# Patient Record
Sex: MALE | Race: WHITE | Employment: OTHER | URBAN - NONMETROPOLITAN AREA
[De-identification: names, ages, dates, MRNs, and addresses within clinical notes are randomized per-mention and may not be internally consistent; named-entity substitution may affect disease eponyms.]

---

## 2019-04-16 LAB
CHOLESTEROL, TOTAL: 129 MG/DL
CHOLESTEROL/HDL RATIO: 2.6
HDLC SERPL-MCNC: 49 MG/DL (ref 35–70)
LDL CHOLESTEROL CALCULATED: 58 MG/DL (ref 0–160)
TRIGL SERPL-MCNC: 134 MG/DL
VLDLC SERPL CALC-MCNC: NORMAL MG/DL

## 2019-05-10 VITALS
DIASTOLIC BLOOD PRESSURE: 70 MMHG | HEART RATE: 72 BPM | BODY MASS INDEX: 33.95 KG/M2 | HEIGHT: 68 IN | WEIGHT: 224 LBS | SYSTOLIC BLOOD PRESSURE: 136 MMHG

## 2019-05-10 RX ORDER — CHLORAL HYDRATE 500 MG
3000 CAPSULE ORAL DAILY
COMMUNITY
End: 2019-05-15

## 2019-05-10 RX ORDER — ATORVASTATIN CALCIUM 40 MG/1
20 TABLET, FILM COATED ORAL DAILY
COMMUNITY
End: 2020-09-16

## 2019-05-10 RX ORDER — PREGABALIN 50 MG/1
50 CAPSULE ORAL 3 TIMES DAILY
COMMUNITY
End: 2019-05-15

## 2019-05-10 RX ORDER — INSULIN GLARGINE 100 [IU]/ML
32 INJECTION, SOLUTION SUBCUTANEOUS
COMMUNITY
End: 2022-10-19 | Stop reason: ALTCHOICE

## 2019-05-10 RX ORDER — CLOPIDOGREL BISULFATE 75 MG/1
75 TABLET ORAL DAILY
COMMUNITY

## 2019-05-10 RX ORDER — TAMSULOSIN HYDROCHLORIDE 0.4 MG/1
0.4 CAPSULE ORAL DAILY
COMMUNITY
End: 2019-05-15

## 2019-05-10 RX ORDER — LISINOPRIL 10 MG/1
10 TABLET ORAL DAILY
COMMUNITY

## 2019-05-10 SDOH — HEALTH STABILITY: MENTAL HEALTH: HOW OFTEN DO YOU HAVE A DRINK CONTAINING ALCOHOL?: NEVER

## 2019-05-13 PROBLEM — I63.9 CVA (CEREBRAL VASCULAR ACCIDENT) (HCC): Status: ACTIVE | Noted: 2019-05-13

## 2019-05-13 PROBLEM — C61 PROSTATE CA (HCC): Status: ACTIVE | Noted: 2019-05-13

## 2019-05-13 PROBLEM — E78.2 MIXED HYPERLIPIDEMIA: Status: ACTIVE | Noted: 2019-05-13

## 2019-05-13 PROBLEM — Z87.09 H/O ASBESTOSIS: Status: ACTIVE | Noted: 2019-05-13

## 2019-05-13 PROBLEM — G47.30 SLEEP APNEA WITH USE OF CONTINUOUS POSITIVE AIRWAY PRESSURE (CPAP): Status: ACTIVE | Noted: 2019-05-13

## 2019-05-13 PROBLEM — I10 ESSENTIAL HYPERTENSION: Status: ACTIVE | Noted: 2017-11-21

## 2019-05-13 PROBLEM — E11.40 NEUROPATHY DUE TO TYPE 2 DIABETES MELLITUS (HCC): Status: ACTIVE | Noted: 2019-05-13

## 2019-05-13 PROBLEM — E11.9 TYPE 2 DIABETES MELLITUS (HCC): Status: ACTIVE | Noted: 2017-11-21

## 2019-05-15 ENCOUNTER — OFFICE VISIT (OUTPATIENT)
Dept: PRIMARY CARE CLINIC | Age: 79
End: 2019-05-15
Payer: MEDICARE

## 2019-05-15 ENCOUNTER — TELEPHONE (OUTPATIENT)
Dept: PRIMARY CARE CLINIC | Age: 79
End: 2019-05-15

## 2019-05-15 ENCOUNTER — HOSPITAL ENCOUNTER (OUTPATIENT)
Age: 79
Discharge: HOME | End: 2019-05-17
Payer: MEDICARE

## 2019-05-15 VITALS
OXYGEN SATURATION: 94 % | TEMPERATURE: 97.6 F | HEART RATE: 80 BPM | WEIGHT: 227 LBS | SYSTOLIC BLOOD PRESSURE: 128 MMHG | DIASTOLIC BLOOD PRESSURE: 72 MMHG | HEIGHT: 68 IN | BODY MASS INDEX: 34.4 KG/M2

## 2019-05-15 DIAGNOSIS — Z79.4 TYPE 2 DIABETES MELLITUS WITH DIABETIC POLYNEUROPATHY, WITH LONG-TERM CURRENT USE OF INSULIN (HCC): ICD-10-CM

## 2019-05-15 DIAGNOSIS — E11.40 NEUROPATHY DUE TO TYPE 2 DIABETES MELLITUS (HCC): ICD-10-CM

## 2019-05-15 DIAGNOSIS — E11.40 TYPE 2 DIABETES MELLITUS WITH DIABETIC NEUROPATHY, WITH LONG-TERM CURRENT USE OF INSULIN (HCC): ICD-10-CM

## 2019-05-15 DIAGNOSIS — M47.817 LUMBAR AND SACRAL OSTEOARTHRITIS: ICD-10-CM

## 2019-05-15 DIAGNOSIS — I63.50 CEREBROVASCULAR ACCIDENT (CVA) DUE TO OCCLUSION OF CEREBRAL ARTERY (HCC): ICD-10-CM

## 2019-05-15 DIAGNOSIS — E78.2 MIXED HYPERLIPIDEMIA: ICD-10-CM

## 2019-05-15 DIAGNOSIS — G47.30 SLEEP APNEA WITH USE OF CONTINUOUS POSITIVE AIRWAY PRESSURE (CPAP): ICD-10-CM

## 2019-05-15 DIAGNOSIS — Z79.4 TYPE 2 DIABETES MELLITUS WITH DIABETIC NEUROPATHY, WITH LONG-TERM CURRENT USE OF INSULIN (HCC): ICD-10-CM

## 2019-05-15 DIAGNOSIS — C61 PROSTATE CA (HCC): ICD-10-CM

## 2019-05-15 DIAGNOSIS — E11.42 TYPE 2 DIABETES MELLITUS WITH DIABETIC POLYNEUROPATHY, WITH LONG-TERM CURRENT USE OF INSULIN (HCC): ICD-10-CM

## 2019-05-15 DIAGNOSIS — Z87.09 H/O ASBESTOSIS: ICD-10-CM

## 2019-05-15 LAB
CHOLESTEROL, TOTAL: 125 MG/DL (ref 0–199)
CREATININE URINE: 75 MG/DL (ref 40–278)
HDLC SERPL-MCNC: 43 MG/DL
LDL CHOLESTEROL CALCULATED: 51 MG/DL (ref 0–99)
MICROALBUMIN UR-MCNC: 265 MG/L
MICROALBUMIN/CREAT UR-RTO: 353.3 (ref 0–30)
TRIGL SERPL-MCNC: 155 MG/DL (ref 0–149)
VLDLC SERPL CALC-MCNC: 31 MG/DL

## 2019-05-15 PROCEDURE — 82044 UR ALBUMIN SEMIQUANTITATIVE: CPT

## 2019-05-15 PROCEDURE — 80061 LIPID PANEL: CPT

## 2019-05-15 PROCEDURE — 99214 OFFICE O/P EST MOD 30 MIN: CPT | Performed by: INTERNAL MEDICINE

## 2019-05-15 PROCEDURE — 36415 COLL VENOUS BLD VENIPUNCTURE: CPT

## 2019-05-15 PROCEDURE — 82570 ASSAY OF URINE CREATININE: CPT

## 2019-05-15 RX ORDER — CHOLECALCIFEROL (VITAMIN D3) 50 MCG
1200 CAPSULE ORAL DAILY
COMMUNITY

## 2019-05-15 RX ORDER — PREGABALIN 75 MG/1
75 CAPSULE ORAL 2 TIMES DAILY
COMMUNITY

## 2019-05-15 RX ORDER — TIMOLOL MALEATE 5 MG/ML
SOLUTION/ DROPS OPHTHALMIC
COMMUNITY
Start: 2019-05-13

## 2019-05-15 ASSESSMENT — ENCOUNTER SYMPTOMS
COUGH: 0
PHOTOPHOBIA: 0
EYE ITCHING: 0
FACIAL SWELLING: 0
NAUSEA: 0
ANAL BLEEDING: 0
EYE PAIN: 0
EYE DISCHARGE: 0
RHINORRHEA: 0
VOMITING: 0
STRIDOR: 0
ABDOMINAL PAIN: 0
BLOOD IN STOOL: 0
DIARRHEA: 0
WHEEZING: 0
CONSTIPATION: 0
COLOR CHANGE: 0
SHORTNESS OF BREATH: 0
SORE THROAT: 0
TROUBLE SWALLOWING: 0

## 2019-05-15 ASSESSMENT — PATIENT HEALTH QUESTIONNAIRE - PHQ9
SUM OF ALL RESPONSES TO PHQ9 QUESTIONS 1 & 2: 0
SUM OF ALL RESPONSES TO PHQ QUESTIONS 1-9: 0
SUM OF ALL RESPONSES TO PHQ QUESTIONS 1-9: 0
2. FEELING DOWN, DEPRESSED OR HOPELESS: 0
1. LITTLE INTEREST OR PLEASURE IN DOING THINGS: 0

## 2019-05-15 NOTE — TELEPHONE ENCOUNTER
Dr Davida Floyd -  Patient called the office back after his appt today,  He got his shingles vaccines on 3/13/18 and 5/21/18. I put these in his chart.

## 2019-05-15 NOTE — TELEPHONE ENCOUNTER
I left patient a VM informing him I scheduled his MRI of his back at [facility] on Tuesday 5/21/19 at 2pm. He has to arrive at 1:45 and bring his ID, ins card, and medication list.

## 2019-05-15 NOTE — PROGRESS NOTES
5/15/2019    Name: Adalberto Victoria : 1940 Sex: male  Age: 70 y.o. 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 Per neurology's request a repeat MRI/MRA of his brain was done which showed old infarct of the right parietal anterior area and he had another infarct of his left thalamus. The thalamic infarct was the most recent one. After his most recent infarct he was placed on Plavix and aspirin along with atorvastatin 40 mg daily . He had an ultrasound of his carotid stenosis which was  negative for significant stenosis. He has not had any further neurological symptoms from his infarction. Patient has a history of neuropathy most probably secondary to diabetes long term. He was having a lot more problems walking and so we wanted to make sure it wasn't coming from spinal stenosis. Lumbar x-rays were done which showed osteoarthritis,with spinal spurs, degenerative disc disease  and facet arthritis. I'm still concerned about the possibility of spinal stenosis contributing to his neuropathic symptoms. Dr. Marlin Roberts did an EMG and nerve conduction tests of his lower extremities. We'll get those results. Will need to do an MRI of his lumbar spine to make sure that he doesn't have any contributing lumbar radiculopathy to his lower extremity problems. He is very unsteady and has to use a cane. He is afraid of falling as he fell once last year in the parking lot. Is on Lyrica for his diabetic neuropathy. He does not want to see any orthropod or physiatry  right now. He does his physical therapy exercises at home    History of lung term insulin use for his diabetes. Last hemoglobin A1c in 2019 was 7.5%. Estimated GFR 59 and creatinine of 1.2 and a fasting blood sugar 74. Most of his blood work done at the [place]. Did not see any lipids on his last blood work so we'll get them today.  He does have a history of hyperlipidemia. He checks his blood sugar twice a day and he says his fasting blood sugars are never over 150. History of hypertension with good control of his blood pressures on present medications, history of hyperlipidemia, history of asbestosis, history of obstructive sleep apnea on CPAP nightly. History of prostate cancer under active surveillance with his urologist Dr. Rodriguez Sebastian. He also sees Dr. Desire Enciso his cardiologist for follow-up on his supraventricular tachycardia. Last echocardiogram showed mild mitral and tricuspid regurgitation with a normal ejection fraction. He usually goes to the [place] for his blood work and for his insulin adjustments. Will be following up with them next week. He'll see that I'll get a copy of his reports. Review of Systems   Constitutional: Negative for appetite change, fatigue and unexpected weight change. HENT: Negative for congestion, ear pain, facial swelling, rhinorrhea, sore throat, tinnitus and trouble swallowing. Eyes: Negative for photophobia, pain, discharge, itching and visual disturbance. Respiratory: Negative for cough, shortness of breath, wheezing and stridor. Cardiovascular: Negative for chest pain, palpitations and leg swelling. Gastrointestinal: Negative for abdominal pain, anal bleeding, blood in stool, constipation, diarrhea, nausea and vomiting. Endocrine: Negative for cold intolerance, heat intolerance, polydipsia, polyphagia and polyuria. Genitourinary: Negative for difficulty urinating, dysuria, flank pain, frequency, hematuria and urgency. Musculoskeletal: Positive for gait problem. Negative for arthralgias, joint swelling and myalgias. Skin: Negative for color change, pallor and rash. Allergic/Immunologic: Negative for environmental allergies and food allergies. Neurological: Positive for numbness. Negative for dizziness, tremors, seizures, syncope, speech difficulty, weakness, light-headedness and headaches.         Unsteady gait. He has to use a cane. Numbness and tingling of his lower extremities below the knee   Hematological: Negative for adenopathy. Does not bruise/bleed easily. Psychiatric/Behavioral: Negative for agitation, behavioral problems, confusion, sleep disturbance and suicidal ideas. The patient is not nervous/anxious. Current Outpatient Medications:     lisinopril (PRINIVIL;ZESTRIL) 10 MG tablet, Take 10 mg by mouth daily, Disp: , Rfl:     clopidogrel (PLAVIX) 75 MG tablet, Take 75 mg by mouth daily, Disp: , Rfl:     Insulin Aspart (NOVOLOG FLEXPEN SC), Inject into the skin As directed, Disp: , Rfl:     metFORMIN (GLUCOPHAGE) 500 MG tablet, Take 500 mg by mouth 2 times daily (with meals), Disp: , Rfl:     atorvastatin (LIPITOR) 40 MG tablet, Take 40 mg by mouth daily, Disp: , Rfl:     pregabalin (LYRICA) 50 MG capsule, Take 50 mg by mouth 3 times daily. , Disp: , Rfl:     Omega-3 Fatty Acids (FISH OIL) 1000 MG CAPS, Take 3,000 mg by mouth daily, Disp: , Rfl:     vitamin D (CHOLECALCIFEROL) 1000 UNIT TABS tablet, Take 1,000 Units by mouth daily, Disp: , Rfl:     Multiple Vitamins-Minerals (MULTI FOR HIM PO), Take by mouth daily, Disp: , Rfl:     aspirin 81 MG tablet, Take 81 mg by mouth daily, Disp: , Rfl:     tamsulosin (FLOMAX) 0.4 MG capsule, Take 0.4 mg by mouth daily, Disp: , Rfl:     verapamil (CALAN SR) 180 MG extended release tablet, Take 180 mg by mouth nightly, Disp: , Rfl:     insulin glargine (LANTUS) 100 UNIT/ML injection vial, Inject into the skin As directed, Disp: , Rfl:     timolol (TIMOPTIC) 0.5 % ophthalmic solution, , Disp: , Rfl:      Allergies   Allergen Reactions    No Known Allergies         Past Medical History:   Diagnosis Date    Chronic kidney disease     [facility]     Gastroparesis     H/O asbestosis     Hypertension     Neuropathy     Prostate CA ([facility] [address] [facility]     Sleep apnea     Sleep apnea with use of continuous positive airway pressure (CPAP)     TIA (transient ischemic attack)     Type 2 diabetes mellitus without complication [facility])        Health Maintenance Due   Topic Date Due    Potassium monitoring  1940    Creatinine monitoring  1940    DTaP/Tdap/Td vaccine (1 - Tdap) 11/27/1961    Shingles Vaccine (1 of 2) 11/27/1992        Patient Active Problem List   Diagnosis    Essential hypertension    Type 2 diabetes mellitus ([facility] [facility]    CVA (cerebral vascular accident) ([facility] [address] [facility]    Neuropathy due to type 2 diabetes mellitus ([facility] [address] [facility]    Sleep apnea with use of continuous positive airway pressure (CPAP)    Mixed hyperlipidemia    H/O asbestosis    Prostate CA ([facility] [address]    Lumbar and sacral osteoarthritis        Past Surgical History:   Procedure Laterality Date    CERVICAL DISC SURGERY      CHOLECYSTECTOMY      KNEE ARTHROPLASTY      PROSTATE BIOPSY          Family History   Problem Relation Age of Onset    Dementia Mother         Social History     Tobacco Use    Smoking status: Never Smoker    Smokeless tobacco: Never Used   Substance Use Topics    Alcohol use: Not Currently     Frequency: Never    Drug use: Never        Objective  Vitals:    05/15/19 0753   BP: 128/72   Site: Left Upper Arm   Position: Sitting   Cuff Size: Medium Adult   Pulse: 80   Temp: 97.6 °F (36.4 °C)   TempSrc: Temporal   SpO2: 94%   Weight: 227 lb (103 kg)   Height: 5' 8\" (1.727 m)        Exam:  Physical Exam   Constitutional: He is oriented to person, place, and time. He appears well-developed and well-nourished. HENT:   Head: Normocephalic. Right Ear: External ear normal.   Left Ear: External ear normal.   Nose: Nose normal.   Mouth/Throat: Oropharynx is clear and moist. No oropharyngeal exudate. Eyes: Pupils are equal, round, and reactive to light. Conjunctivae and EOM are normal. Right eye exhibits no discharge. Left eye exhibits no discharge. No scleral icterus. Neck: Normal range of motion. Neck supple. No thyromegaly present. mellitus ([address]     Continue Lyrica, obtain results of EMG and nerve conduction tests from neurologist         Relevant Medications    Insulin Aspart (NOVOLOG FLEXPEN SC)    metFORMIN (GLUCOPHAGE) 500 MG tablet    insulin glargine (LANTUS) 100 UNIT/ML injection vial       Musculoskeletal and Integument    Lumbar and sacral osteoarthritis     Mri lumbar spine         Relevant Medications    aspirin 81 MG tablet    Other Relevant Orders    MRI LUMBAR SPINE W CONTRAST       Genitourinary    Prostate CA [facility])     Follow-up with urologist         Relevant Medications    pregabalin (LYRICA) 50 MG capsule    aspirin 81 MG tablet       Other    Mixed hyperlipidemia     Check lipids today. Watch saturated fats in diet. Relevant Medications    lisinopril (PRINIVIL;ZESTRIL) 10 MG tablet    atorvastatin (LIPITOR) 40 MG tablet    aspirin 81 MG tablet    verapamil (CALAN SR) 180 MG extended release tablet    Other Relevant Orders    Lipid Panel    H/O asbestosis           Return in about 3 months (around 8/15/2019).        Reggie Carpio,   5/15/2019

## 2019-05-22 LAB
ALBUMIN SERPL-MCNC: 3.7 G/DL
ALP BLD-CCNC: 69 U/L
ALT SERPL-CCNC: 43 U/L
ANION GAP SERPL CALCULATED.3IONS-SCNC: 12 MMOL/L
AST SERPL-CCNC: 36 U/L
AVERAGE GLUCOSE: NORMAL
BASOPHILS ABSOLUTE: NORMAL /ΜL
BASOPHILS RELATIVE PERCENT: NORMAL %
BILIRUB SERPL-MCNC: 1.8 MG/DL (ref 0.1–1.4)
BUN BLDV-MCNC: 26 MG/DL
CALCIUM SERPL-MCNC: 9.3 MG/DL
CHLORIDE BLD-SCNC: 106 MMOL/L
CO2: 29 MMOL/L
CREAT SERPL-MCNC: 1.3 MG/DL
EOSINOPHILS ABSOLUTE: NORMAL /ΜL
EOSINOPHILS RELATIVE PERCENT: NORMAL %
GFR CALCULATED: ABNORMAL
GLUCOSE BLD-MCNC: 153 MG/DL
HBA1C MFR BLD: 7.8 %
HCT VFR BLD CALC: 43.6 % (ref 41–53)
HEMOGLOBIN: 14.5 G/DL (ref 13.5–17.5)
LYMPHOCYTES ABSOLUTE: NORMAL /ΜL
LYMPHOCYTES RELATIVE PERCENT: NORMAL %
MCH RBC QN AUTO: NORMAL PG
MCHC RBC AUTO-ENTMCNC: NORMAL G/DL
MCV RBC AUTO: NORMAL FL
MONOCYTES ABSOLUTE: NORMAL /ΜL
MONOCYTES RELATIVE PERCENT: NORMAL %
NEUTROPHILS ABSOLUTE: NORMAL /ΜL
NEUTROPHILS RELATIVE PERCENT: NORMAL %
PLATELET # BLD: 231 K/ΜL
PMV BLD AUTO: NORMAL FL
POTASSIUM SERPL-SCNC: 4.7 MMOL/L
RBC # BLD: 4.62 10^6/ΜL
SODIUM BLD-SCNC: 142 MMOL/L
TOTAL PROTEIN: 7.6
WBC # BLD: 5.7 10^3/ML

## 2019-05-24 ENCOUNTER — TELEPHONE (OUTPATIENT)
Dept: PRIMARY CARE CLINIC | Age: 79
End: 2019-05-24

## 2019-05-24 NOTE — TELEPHONE ENCOUNTER
Per Dr Jeannette Jansen I set patient up to see Dr Albert Chiu with [facility] on May 30th, 1:45pm. Patient instructed to bring discs\"(he has to get them from hospital) and his ins cards and ID.  Pt given phone number ([phone]) and address ([address] [place] [address])

## 2019-06-11 ENCOUNTER — OFFICE VISIT (OUTPATIENT)
Dept: PRIMARY CARE CLINIC | Age: 79
End: 2019-06-11
Payer: MEDICARE

## 2019-06-11 VITALS
HEART RATE: 74 BPM | TEMPERATURE: 98.4 F | HEIGHT: 68 IN | DIASTOLIC BLOOD PRESSURE: 72 MMHG | SYSTOLIC BLOOD PRESSURE: 148 MMHG | OXYGEN SATURATION: 97 % | BODY MASS INDEX: 34.56 KG/M2 | WEIGHT: 228 LBS | RESPIRATION RATE: 18 BRPM

## 2019-06-11 DIAGNOSIS — E11.40 NEUROPATHY DUE TO TYPE 2 DIABETES MELLITUS (HCC): ICD-10-CM

## 2019-06-11 DIAGNOSIS — Z86.79 HISTORY OF PAROXYSMAL SUPRAVENTRICULAR TACHYCARDIA: ICD-10-CM

## 2019-06-11 DIAGNOSIS — Z01.818 PREOPERATIVE GENERAL PHYSICAL EXAMINATION: ICD-10-CM

## 2019-06-11 DIAGNOSIS — Z79.4 TYPE 2 DIABETES MELLITUS WITH DIABETIC NEUROPATHY, WITH LONG-TERM CURRENT USE OF INSULIN (HCC): Primary | ICD-10-CM

## 2019-06-11 DIAGNOSIS — E78.2 MIXED HYPERLIPIDEMIA: ICD-10-CM

## 2019-06-11 DIAGNOSIS — E11.40 TYPE 2 DIABETES MELLITUS WITH DIABETIC NEUROPATHY, WITH LONG-TERM CURRENT USE OF INSULIN (HCC): Primary | ICD-10-CM

## 2019-06-11 DIAGNOSIS — M48.061 SPINAL STENOSIS OF LUMBAR REGION AT MULTIPLE LEVELS: ICD-10-CM

## 2019-06-11 DIAGNOSIS — I10 ESSENTIAL HYPERTENSION: ICD-10-CM

## 2019-06-11 PROCEDURE — 99215 OFFICE O/P EST HI 40 MIN: CPT | Performed by: INTERNAL MEDICINE

## 2019-06-11 ASSESSMENT — ENCOUNTER SYMPTOMS
EYE DISCHARGE: 0
STRIDOR: 0
DIARRHEA: 0
ABDOMINAL PAIN: 0
VOMITING: 0
BACK PAIN: 1
WHEEZING: 0
BLOOD IN STOOL: 0
PHOTOPHOBIA: 0
COUGH: 0
TROUBLE SWALLOWING: 0
EYE ITCHING: 0
COLOR CHANGE: 0
SORE THROAT: 0
ANAL BLEEDING: 0
FACIAL SWELLING: 0
EYE PAIN: 0
NAUSEA: 0
SHORTNESS OF BREATH: 0
CONSTIPATION: 0
RHINORRHEA: 0

## 2019-06-11 NOTE — ASSESSMENT & PLAN NOTE
Medically optimized for surgery  Instructions given to patient  on how to take his medications. He is to stop Plavix, aspirin, and all supplements today. Continue his other medications until the night before surgery. He is only to take 22 units of Lantus the night before surgery he is to stop his metformin 2 days prior to surgery. He can take Tylenol as needed for pain.   On the day of surgery he is only to take his Lyrica 75 mg and verapamil 180 mg  with small sips of water

## 2019-06-11 NOTE — ASSESSMENT & PLAN NOTE
Most recent hemoglobin A1c was 7.5%. This is improved compared to previous. Patient continues on Lantus 44 units at bedtime, NovoLog FlexPen 10 units with breakfast 5 units with lunch and 8 units with dinner. He is to continue on his present dose of insulin but cut his Lantus down to 22 units on the night before surgery. Did not take any insulin on the morning of surgery. He is to stop his metformin 2 days prior to surgery.

## 2019-06-11 NOTE — PROGRESS NOTES
2019    Name: Niels Evans : 1940 Sex: male  Age: 70 y.o. Subjective:  Chief Complaint   Patient presents with    Pre-op Exam     lumbar surgery scheduled for 19. HPI     Patient is here for preoperative evaluation prior to surgery next Friday. Patient has had increased pain in his low back radiating down his legs. He has known diabetic neuropathy but this is much worse than before. MRI of his spine revealed spinal stenosis at multiple levels of his lumbar spine. He had a herniated disc at L4- L5 level. He also had spondylolisthesis L4-L5 with instability in flexion and significant stenosis at this level. He was referred to Dr. Khadra Carrillo who evaluated him and has scheduled surgery on Friday, . Christina Arreaga Past medical history includes small cerebral infarcts last year. No residual neurological deficit. He has a history of hypertension, type 2 diabetes mellitus on insulin. Diabetic neuropathy. Diabetic gastroparesis. Hyperlipidemia, obstructive sleep apnea on CPAP. Past history of prostate cancer under the care of Dr. Shital Paul saw him last week. .  He has a history of supraventricular tachycardia under the care of Dr. Viki Pandey. He last saw Dr. Viki Pandey in 2019 at which time an EKG revealed sinus rhythm with nonspecific intraventricular conduction delay he last saw his eye doctor Dr. Danny Terry  in 2019. 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 Review of Systems   Constitutional: Negative for appetite change, fatigue and unexpected weight change. HENT: Negative for congestion, ear pain, facial swelling, rhinorrhea, sore throat, tinnitus and trouble swallowing. Eyes: Negative for photophobia, pain, discharge, itching and visual disturbance. Respiratory: Negative for cough, shortness of breath, wheezing and stridor. Cardiovascular: Negative for chest pain, palpitations and leg swelling.    Gastrointestinal: Negative for abdominal pain, anal bleeding, blood in stool, constipation, diarrhea, nausea and vomiting. Endocrine: Negative for cold intolerance, heat intolerance, polydipsia, polyphagia and polyuria. Genitourinary: Negative for difficulty urinating, dysuria, flank pain, frequency, hematuria and urgency. Musculoskeletal: Positive for back pain and myalgias. Negative for arthralgias, gait problem and joint swelling. Skin: Negative for color change, pallor and rash. Allergic/Immunologic: Negative for environmental allergies and food allergies. Neurological: Positive for weakness and numbness. Negative for dizziness, tremors, seizures, syncope, speech difficulty, light-headedness and headaches. Hematological: Negative for adenopathy. Does not bruise/bleed easily. Psychiatric/Behavioral: Negative for agitation, behavioral problems, confusion, sleep disturbance and suicidal ideas. The patient is not nervous/anxious. Current Outpatient Medications:     timolol (TIMOPTIC) 0.5 % ophthalmic solution, , Disp: , Rfl:     HM OMEGA-3-6-9 FATTY ACIDS CAPS, Take 1,200 mg by mouth daily, Disp: , Rfl:     pregabalin (LYRICA) 75 MG capsule, Take 75 mg by mouth 2 times daily. , Disp: , Rfl:     lisinopril (PRINIVIL;ZESTRIL) 10 MG tablet, Take 10 mg by mouth daily, Disp: , Rfl:     clopidogrel (PLAVIX) 75 MG tablet, Take 75 mg by mouth daily, Disp: , Rfl:     Insulin Aspart (NOVOLOG FLEXPEN SC), Inject into the skin As directed, Disp: , Rfl:     metFORMIN (GLUCOPHAGE) 500 MG tablet, Take 500 mg by mouth 2 times daily (with meals), Disp: , Rfl:     atorvastatin (LIPITOR) 40 MG tablet, Take 20 mg by mouth daily , Disp: , Rfl:     vitamin D (CHOLECALCIFEROL) 1000 UNIT TABS tablet, Take 1,000 Units by mouth daily, Disp: , Rfl:     Multiple Vitamins-Minerals (MULTI FOR HIM PO), Take by mouth daily, Disp: , Rfl:     aspirin 81 MG tablet, Take 81 mg by mouth 2 times daily , Disp: , Rfl:     verapamil (CALAN SR) Temp: 98.4 °F (36.9 °C)   TempSrc: Temporal   SpO2: 97%   Weight: 228 lb (103.4 kg)   Height: 5' 8\" (1.727 m)        Exam:  Physical Exam   Constitutional: He is oriented to person, place, and time. He appears well-developed and well-nourished. HENT:   Head: Normocephalic. Right Ear: External ear normal.   Left Ear: External ear normal.   Nose: Nose normal.   Mouth/Throat: Oropharynx is clear and moist. No oropharyngeal exudate. Eyes: Pupils are equal, round, and reactive to light. Conjunctivae and EOM are normal. Right eye exhibits no discharge. Left eye exhibits no discharge. No scleral icterus. Neck: Normal range of motion. Neck supple. No thyromegaly present. Cardiovascular: Normal rate, regular rhythm, normal heart sounds and intact distal pulses. Exam reveals no gallop and no friction rub. No murmur heard. Pulmonary/Chest: Effort normal and breath sounds normal. No respiratory distress. He has no wheezes. He has no rales. He exhibits no tenderness. Abdominal: Soft. Bowel sounds are normal. He exhibits no distension and no mass. There is no tenderness. There is no rebound and no guarding. Protruberant   Musculoskeletal: He exhibits edema. He exhibits no tenderness or deformity. Trace pedal edema. Decreased forward flexion and extension of his dorsal spine  Ambulates with difficulty and has to use a cane   Lymphadenopathy:     He has no cervical adenopathy. Neurological: He is alert and oriented to person, place, and time. He displays normal reflexes. A sensory deficit is present. No cranial nerve deficit. Decreased sensation to touch below the knee   Skin: Skin is warm and dry. No rash noted. No erythema. No pallor. Psychiatric: He has a normal mood and affect. His behavior is normal. Judgment and thought content normal.   Vitals reviewed. Last labs reviewed.       ASSESSMENT & PLAN :   Problem List        Circulatory    Essential hypertension     Blood pressures a little high today. Patient instructed to watch salt in diet and monitor blood pressures at home. Continue lisinopril but do not take it on the day of surgery         Relevant Medications    lisinopril (PRINIVIL;ZESTRIL) 10 MG tablet    atorvastatin (LIPITOR) 40 MG tablet    aspirin 81 MG tablet    verapamil (CALAN SR) 180 MG extended release tablet       Endocrine    Type 2 diabetes mellitus ([address] - Primary     Most recent hemoglobin A1c was 7.5%. This is improved compared to previous. Patient continues on Lantus 44 units at bedtime, NovoLog FlexPen 10 units with breakfast 5 units with lunch and 8 units with dinner. He is to continue on his present dose of insulin but cut his Lantus down to 22 units on the night before surgery. Did not take any insulin on the morning of surgery. He is to stop his metformin 2 days prior to surgery. Relevant Medications    Insulin Aspart (NOVOLOG FLEXPEN SC)    metFORMIN (GLUCOPHAGE) 500 MG tablet    insulin glargine (LANTUS) 100 UNIT/ML injection vial    Neuropathy due to type 2 diabetes mellitus (HCC)     Continue his Lyrica. He is to take Lyrica 75 mg on the morning of surgery with a small sip of water         Relevant Medications    Insulin Aspart (NOVOLOG FLEXPEN SC)    metFORMIN (GLUCOPHAGE) 500 MG tablet    insulin glargine (LANTUS) 100 UNIT/ML injection vial       Other    Mixed hyperlipidemia     Continue pravastatin         Relevant Medications    lisinopril (PRINIVIL;ZESTRIL) 10 MG tablet    atorvastatin (LIPITOR) 40 MG tablet    aspirin 81 MG tablet    verapamil (CALAN SR) 180 MG extended release tablet    Spinal stenosis of lumbar region at multiple levels    Preoperative general physical examination     Medically optimized for surgery  Instructions given to patient  on how to take his medications. He is to stop Plavix, aspirin, and all supplements today. Continue his other medications until the night before surgery.   He is only to take 22 units of Lantus the night before surgery he is to stop his metformin 2 days prior to surgery. He can take Tylenol as needed for pain. On the day of surgery he is only to take his Lyrica 75 mg and verapamil 180 mg  with small sips of water         History of paroxysmal supraventricular tachycardia     Continue verapamil. He is to take verapamil with a small sip of water on the day of surgery                Return in about 3 months (around 9/11/2019).        Lisandro Leavitt, DO  6/11/2019

## 2019-06-11 NOTE — PATIENT INSTRUCTIONS
Stop Clopidogrel and aspirin today  Stop Metformin 2 days before surgery  Take 22 units of Lantus the night before surgery  On the day of surgery take Verapamil and Lyrica with small sips of water  Take only Tylenol for pain before surgery  Stop all supplements today

## 2019-06-13 DIAGNOSIS — M47.817 LUMBAR AND SACRAL OSTEOARTHRITIS: ICD-10-CM

## 2019-07-11 PROBLEM — Z01.818 PREOPERATIVE GENERAL PHYSICAL EXAMINATION: Status: RESOLVED | Noted: 2019-06-11 | Resolved: 2019-07-11

## 2019-09-12 ENCOUNTER — OFFICE VISIT (OUTPATIENT)
Dept: PRIMARY CARE CLINIC | Age: 79
End: 2019-09-12
Payer: MEDICARE

## 2019-09-12 VITALS
OXYGEN SATURATION: 94 % | DIASTOLIC BLOOD PRESSURE: 66 MMHG | HEIGHT: 68 IN | BODY MASS INDEX: 32.96 KG/M2 | HEART RATE: 85 BPM | WEIGHT: 217.5 LBS | RESPIRATION RATE: 16 BRPM | SYSTOLIC BLOOD PRESSURE: 124 MMHG | TEMPERATURE: 97.9 F

## 2019-09-12 DIAGNOSIS — E11.40 TYPE 2 DIABETES MELLITUS WITH DIABETIC NEUROPATHY, WITH LONG-TERM CURRENT USE OF INSULIN (HCC): ICD-10-CM

## 2019-09-12 DIAGNOSIS — E78.2 MIXED HYPERLIPIDEMIA: ICD-10-CM

## 2019-09-12 DIAGNOSIS — C61 PROSTATE CA (HCC): ICD-10-CM

## 2019-09-12 DIAGNOSIS — G47.30 SLEEP APNEA WITH USE OF CONTINUOUS POSITIVE AIRWAY PRESSURE (CPAP): ICD-10-CM

## 2019-09-12 DIAGNOSIS — Z79.4 TYPE 2 DIABETES MELLITUS WITH DIABETIC NEUROPATHY, WITH LONG-TERM CURRENT USE OF INSULIN (HCC): ICD-10-CM

## 2019-09-12 DIAGNOSIS — E11.40 NEUROPATHY DUE TO TYPE 2 DIABETES MELLITUS (HCC): ICD-10-CM

## 2019-09-12 DIAGNOSIS — Z86.79 HISTORY OF PAROXYSMAL SUPRAVENTRICULAR TACHYCARDIA: ICD-10-CM

## 2019-09-12 DIAGNOSIS — I10 ESSENTIAL HYPERTENSION: Primary | ICD-10-CM

## 2019-09-12 DIAGNOSIS — M48.061 SPINAL STENOSIS OF LUMBAR REGION AT MULTIPLE LEVELS: ICD-10-CM

## 2019-09-12 DIAGNOSIS — Z23 NEED FOR INFLUENZA VACCINATION: ICD-10-CM

## 2019-09-12 DIAGNOSIS — I63.50 CEREBROVASCULAR ACCIDENT (CVA) DUE TO OCCLUSION OF CEREBRAL ARTERY (HCC): ICD-10-CM

## 2019-09-12 DIAGNOSIS — Z23 NEED FOR DIPHTHERIA-TETANUS-PERTUSSIS (TDAP) VACCINE: ICD-10-CM

## 2019-09-12 PROBLEM — G62.9 NEUROPATHY: Status: ACTIVE | Noted: 2019-09-12

## 2019-09-12 LAB — HBA1C MFR BLD: 6.6 %

## 2019-09-12 PROCEDURE — 83036 HEMOGLOBIN GLYCOSYLATED A1C: CPT | Performed by: INTERNAL MEDICINE

## 2019-09-12 PROCEDURE — G0008 ADMIN INFLUENZA VIRUS VAC: HCPCS | Performed by: INTERNAL MEDICINE

## 2019-09-12 PROCEDURE — 99214 OFFICE O/P EST MOD 30 MIN: CPT | Performed by: INTERNAL MEDICINE

## 2019-09-12 PROCEDURE — 90653 IIV ADJUVANT VACCINE IM: CPT | Performed by: INTERNAL MEDICINE

## 2019-09-12 ASSESSMENT — ENCOUNTER SYMPTOMS
PHOTOPHOBIA: 0
SORE THROAT: 0
RHINORRHEA: 0
EYE DISCHARGE: 0
ANAL BLEEDING: 0
BACK PAIN: 1
DIARRHEA: 0
FACIAL SWELLING: 0
STRIDOR: 0
BLOOD IN STOOL: 0
EYE ITCHING: 0
VOMITING: 0
ABDOMINAL PAIN: 0
SHORTNESS OF BREATH: 0
WHEEZING: 0
EYE PAIN: 0
TROUBLE SWALLOWING: 0
CONSTIPATION: 0
NAUSEA: 0
COLOR CHANGE: 0
COUGH: 0

## 2019-09-12 NOTE — ASSESSMENT & PLAN NOTE
Watch his diet. Monitor his blood sugars as directed. Let me know if his blood sugars are consistently elevated over 120 fasting.   Checked hemoglobin A1c today and it was good at 6.6%

## 2019-09-12 NOTE — PROGRESS NOTES
History   Problem Relation Age of Onset    Dementia Mother         Social History     Tobacco Use    Smoking status: Never Smoker    Smokeless tobacco: Never Used   Substance Use Topics    Alcohol use: Not Currently     Frequency: Never    Drug use: Never        Objective  Vitals:    09/12/19 0945   BP: 124/66   Site: Left Upper Arm   Position: Sitting   Cuff Size: Large Adult   Pulse: 85   Resp: 16   Temp: 97.9 °F (36.6 °C)   TempSrc: Temporal   SpO2: 94%   Weight: 217 lb 8 oz (98.7 kg)   Height: 5' 8\" (1.727 m)        Exam:  Physical Exam   Constitutional: He is oriented to person, place, and time. He appears well-developed and well-nourished. HENT:   Head: Normocephalic. Right Ear: External ear normal.   Left Ear: External ear normal.   Nose: Nose normal.   Mouth/Throat: Oropharynx is clear and moist. No oropharyngeal exudate. Eyes: Pupils are equal, round, and reactive to light. Conjunctivae and EOM are normal. Right eye exhibits no discharge. Left eye exhibits no discharge. No scleral icterus. Neck: Normal range of motion. Neck supple. No thyromegaly present. Cardiovascular: Normal rate, regular rhythm, normal heart sounds and intact distal pulses. Exam reveals no gallop and no friction rub. No murmur heard. Pulmonary/Chest: Effort normal and breath sounds normal. No respiratory distress. He has no wheezes. He has no rales. He exhibits no tenderness. Abdominal: Soft. Bowel sounds are normal. He exhibits no distension and no mass. There is no tenderness. There is no rebound and no guarding. Protruberant   Musculoskeletal: He exhibits edema. He exhibits no tenderness or deformity. Ambulates with  a cane   Lymphadenopathy:     He has no cervical adenopathy. Neurological: He is alert and oriented to person, place, and time. He displays normal reflexes. A sensory deficit is present. No cranial nerve deficit. Decreased sensation to touch below the knee   Skin: Skin is warm and dry.  No

## 2019-09-12 NOTE — ASSESSMENT & PLAN NOTE
Blood pressures are stable. Continue medications and monitor blood pressures at home. Call office if systolics are over 770 over diastolics over 90.

## 2019-09-19 LAB
AVERAGE GLUCOSE: NORMAL
CHOLESTEROL, TOTAL: 118 MG/DL
CHOLESTEROL/HDL RATIO: NORMAL
HBA1C MFR BLD: 6.7 %
HDLC SERPL-MCNC: 41 MG/DL (ref 35–70)
LDL CHOLESTEROL CALCULATED: 60 MG/DL (ref 0–160)
TRIGL SERPL-MCNC: 231 MG/DL
VLDLC SERPL CALC-MCNC: NORMAL MG/DL

## 2019-10-12 PROBLEM — Z23 NEED FOR INFLUENZA VACCINATION: Status: RESOLVED | Noted: 2019-09-12 | Resolved: 2019-10-12

## 2019-12-04 ASSESSMENT — ENCOUNTER SYMPTOMS
TROUBLE SWALLOWING: 0
COLOR CHANGE: 0
EYE DISCHARGE: 0
SHORTNESS OF BREATH: 0
ABDOMINAL PAIN: 0
PHOTOPHOBIA: 0
EYE ITCHING: 0
RHINORRHEA: 0
WHEEZING: 0
NAUSEA: 0
BACK PAIN: 1
ANAL BLEEDING: 0
COUGH: 0
SORE THROAT: 0
STRIDOR: 0
VOMITING: 0
BLOOD IN STOOL: 0
FACIAL SWELLING: 0
DIARRHEA: 0
CONSTIPATION: 0
EYE PAIN: 0

## 2019-12-16 ENCOUNTER — OFFICE VISIT (OUTPATIENT)
Dept: PRIMARY CARE CLINIC | Age: 79
End: 2019-12-16
Payer: MEDICARE

## 2019-12-16 ENCOUNTER — TELEPHONE (OUTPATIENT)
Dept: PRIMARY CARE CLINIC | Age: 79
End: 2019-12-16

## 2019-12-16 VITALS
BODY MASS INDEX: 31.37 KG/M2 | HEART RATE: 106 BPM | RESPIRATION RATE: 16 BRPM | TEMPERATURE: 98 F | WEIGHT: 207 LBS | HEIGHT: 68 IN | DIASTOLIC BLOOD PRESSURE: 70 MMHG | OXYGEN SATURATION: 96 % | SYSTOLIC BLOOD PRESSURE: 118 MMHG

## 2019-12-16 VITALS
OXYGEN SATURATION: 96 % | SYSTOLIC BLOOD PRESSURE: 118 MMHG | RESPIRATION RATE: 16 BRPM | WEIGHT: 207 LBS | BODY MASS INDEX: 31.37 KG/M2 | HEIGHT: 68 IN | DIASTOLIC BLOOD PRESSURE: 70 MMHG | TEMPERATURE: 98 F | HEART RATE: 106 BPM

## 2019-12-16 DIAGNOSIS — E11.40 NEUROPATHY DUE TO TYPE 2 DIABETES MELLITUS (HCC): ICD-10-CM

## 2019-12-16 DIAGNOSIS — E78.2 MIXED HYPERLIPIDEMIA: ICD-10-CM

## 2019-12-16 DIAGNOSIS — Z79.4 TYPE 2 DIABETES MELLITUS WITH DIABETIC NEUROPATHY, WITH LONG-TERM CURRENT USE OF INSULIN (HCC): ICD-10-CM

## 2019-12-16 DIAGNOSIS — I10 ESSENTIAL HYPERTENSION: ICD-10-CM

## 2019-12-16 DIAGNOSIS — Z00.00 ROUTINE GENERAL MEDICAL EXAMINATION AT A HEALTH CARE FACILITY: ICD-10-CM

## 2019-12-16 DIAGNOSIS — E11.40 TYPE 2 DIABETES MELLITUS WITH DIABETIC NEUROPATHY, WITH LONG-TERM CURRENT USE OF INSULIN (HCC): ICD-10-CM

## 2019-12-16 DIAGNOSIS — R42 VERTIGO: Primary | ICD-10-CM

## 2019-12-16 DIAGNOSIS — N18.30 STAGE 3 CHRONIC KIDNEY DISEASE (HCC): ICD-10-CM

## 2019-12-16 PROBLEM — N18.9 CHRONIC KIDNEY DISEASE: Status: ACTIVE | Noted: 2019-12-16

## 2019-12-16 PROBLEM — G47.30 SLEEP APNEA WITH USE OF CONTINUOUS POSITIVE AIRWAY PRESSURE (CPAP): Status: RESOLVED | Noted: 2019-05-13 | Resolved: 2019-12-16

## 2019-12-16 PROCEDURE — 99213 OFFICE O/P EST LOW 20 MIN: CPT | Performed by: INTERNAL MEDICINE

## 2019-12-16 PROCEDURE — G8427 DOCREV CUR MEDS BY ELIG CLIN: HCPCS | Performed by: INTERNAL MEDICINE

## 2019-12-16 PROCEDURE — 4040F PNEUMOC VAC/ADMIN/RCVD: CPT | Performed by: INTERNAL MEDICINE

## 2019-12-16 PROCEDURE — G8482 FLU IMMUNIZE ORDER/ADMIN: HCPCS | Performed by: INTERNAL MEDICINE

## 2019-12-16 PROCEDURE — G8598 ASA/ANTIPLAT THER USED: HCPCS | Performed by: INTERNAL MEDICINE

## 2019-12-16 PROCEDURE — 1036F TOBACCO NON-USER: CPT | Performed by: INTERNAL MEDICINE

## 2019-12-16 PROCEDURE — G0439 PPPS, SUBSEQ VISIT: HCPCS | Performed by: INTERNAL MEDICINE

## 2019-12-16 PROCEDURE — 1123F ACP DISCUSS/DSCN MKR DOCD: CPT | Performed by: INTERNAL MEDICINE

## 2019-12-16 PROCEDURE — G8417 CALC BMI ABV UP PARAM F/U: HCPCS | Performed by: INTERNAL MEDICINE

## 2019-12-16 RX ORDER — MECLIZINE HCL 12.5 MG/1
12.5 TABLET ORAL 3 TIMES DAILY PRN
Qty: 30 TABLET | Refills: 0 | Status: SHIPPED | OUTPATIENT
Start: 2019-12-16 | End: 2019-12-26

## 2019-12-16 ASSESSMENT — PATIENT HEALTH QUESTIONNAIRE - PHQ9
SUM OF ALL RESPONSES TO PHQ QUESTIONS 1-9: 0
SUM OF ALL RESPONSES TO PHQ QUESTIONS 1-9: 0

## 2019-12-16 ASSESSMENT — LIFESTYLE VARIABLES: HOW OFTEN DO YOU HAVE A DRINK CONTAINING ALCOHOL: 0

## 2020-01-21 ENCOUNTER — TELEPHONE (OUTPATIENT)
Dept: PRIMARY CARE CLINIC | Age: 80
End: 2020-01-21

## 2020-02-24 LAB
ALBUMIN SERPL-MCNC: 3.9 G/DL
ALP BLD-CCNC: 83 U/L
ALT SERPL-CCNC: 36 U/L
ANION GAP SERPL CALCULATED.3IONS-SCNC: ABNORMAL MMOL/L
AST SERPL-CCNC: 32 U/L
AVERAGE GLUCOSE: NORMAL
BILIRUB SERPL-MCNC: 1.8 MG/DL (ref 0.1–1.4)
BUN BLDV-MCNC: 27 MG/DL
CALCIUM SERPL-MCNC: 9.2 MG/DL
CHLORIDE BLD-SCNC: 101 MMOL/L
CHOLESTEROL, TOTAL: 127 MG/DL
CHOLESTEROL/HDL RATIO: NORMAL
CO2: 27 MMOL/L
CREAT SERPL-MCNC: 1.5 MG/DL
CREATININE, URINE: 173
GFR CALCULATED: ABNORMAL
GLUCOSE BLD-MCNC: 142 MG/DL
HBA1C MFR BLD: 7.7 %
HDLC SERPL-MCNC: 46 MG/DL (ref 35–70)
LDL CHOLESTEROL CALCULATED: 67 MG/DL (ref 0–160)
MICROALBUMIN/CREAT 24H UR: 0.6 MG/G{CREAT}
MICROALBUMIN/CREAT UR-RTO: 3.3
POTASSIUM SERPL-SCNC: 4.1 MMOL/L
SODIUM BLD-SCNC: 137 MMOL/L
TOTAL PROTEIN: 8
TRIGL SERPL-MCNC: 205 MG/DL
VITAMIN B-12: 234
VLDLC SERPL CALC-MCNC: NORMAL MG/DL

## 2020-03-12 PROBLEM — M48.061 SPINAL STENOSIS OF LUMBAR REGION AT MULTIPLE LEVELS: Status: RESOLVED | Noted: 2019-06-11 | Resolved: 2020-03-12

## 2020-03-12 ASSESSMENT — ENCOUNTER SYMPTOMS
EYE PAIN: 0
EYE ITCHING: 0
SORE THROAT: 0
BLOOD IN STOOL: 0
CONSTIPATION: 0
WHEEZING: 0
DIARRHEA: 0
ANAL BLEEDING: 0
RHINORRHEA: 0
FACIAL SWELLING: 0
SHORTNESS OF BREATH: 0
TROUBLE SWALLOWING: 0
COLOR CHANGE: 0
STRIDOR: 0
NAUSEA: 0
ABDOMINAL PAIN: 0
EYE DISCHARGE: 0
COUGH: 0
VOMITING: 0
PHOTOPHOBIA: 0

## 2020-03-12 NOTE — PROGRESS NOTES
11[birth year]27/1959    DTaP/Tdap/Td vaccine (1 - Tdap) 11/27/1961    PSA counseling  11/27/1992        Patient Active Problem List   Diagnosis    Essential hypertension    [facility] [facility]    CVA (cerebral vascular accident) ([facility] [address] [facility]    Neuropathy due to type 2 diabetes mellitus ([facility] [address] [facility]    Sleep apnea with use of continuous positive airway pressure (CPAP)    Mixed hyperlipidemia    H/O asbestosis    Prostate CA ([facility] [address]    Lumbar and sacral osteoarthritis    History of paroxysmal supraventricular tachycardia    Neuropathy    Need for diphtheria-tetanus-pertussis (Tdap) vaccine    Vertigo    Stage 3 chronic kidney disease (HCC)        Past Surgical History:   Procedure Laterality Date    CERVICAL DISC SURGERY      CHOLECYSTECTOMY      KNEE ARTHROPLASTY      PROSTATE BIOPSY          Family History   Problem Relation Age of Onset    Dementia Mother         Social History     Tobacco Use    Smoking status: Never Smoker    Smokeless tobacco: Never Used   Substance Use Topics    Alcohol use: Not Currently     Frequency: Never    Drug use: Never        Objective  Vitals:    03/16/20 0957   BP: 136/68   Site: Right Upper Arm   Position: Sitting   Cuff Size: Medium Adult   Pulse: 94   Temp: 97 °F (36.1 °C)   TempSrc: Temporal   SpO2: 95%   Weight: 208 lb (94.3 kg)   Height: 5' 8\" (1.727 m)        Exam:  Physical Exam  Vitals signs reviewed. Constitutional:       Appearance: He is well-developed. HENT:      Head: Normocephalic. Right Ear: External ear normal.      Left Ear: External ear normal.      Nose: Nose normal.      Mouth/Throat:      Pharynx: No oropharyngeal exudate. Eyes:      General: No scleral icterus. Right eye: No discharge. Left eye: No discharge. Conjunctiva/sclera: Conjunctivae normal.      Pupils: Pupils are equal, round, and reactive to light. Neck:      Musculoskeletal: Normal range of motion and neck supple. Thyroid: No thyromegaly. Cardiovascular:      Rate and Rhythm: Normal rate and regular rhythm. Heart sounds: Normal heart sounds. No murmur. No friction rub. No gallop. Pulmonary:      Effort: Pulmonary effort is normal. No respiratory distress. Breath sounds: Normal breath sounds. No wheezing or rales. Chest:      Chest wall: No tenderness. Abdominal:      General: Bowel sounds are normal. There is no distension. Palpations: Abdomen is soft. There is no mass. Tenderness: There is no abdominal tenderness. There is no guarding or rebound. Comments: Protruberant   Musculoskeletal:         General: No tenderness or deformity. Comments:   Ambulates with  a cane   Lymphadenopathy:      Cervical: No cervical adenopathy. Skin:     General: Skin is warm and dry. Coloration: Skin is not pale. Findings: No erythema or rash. Neurological:      Mental Status: He is alert and oriented to person, place, and time. Cranial Nerves: No cranial nerve deficit. Sensory: Sensory deficit present. Deep Tendon Reflexes: Reflexes normal.      Comments: Decreased sensation to touch below the knee   Psychiatric:         Behavior: Behavior normal.         Thought Content: Thought content normal.         Judgment: Judgment normal.          Last labs reviewed. ASSESSMENT & PLAN :   Problem List        Circulatory    Essential hypertension - Primary     Blood pressures are stable. Continue medications and monitor blood pressures at home. Call office if systolics are over 349 over diastolics over 90.             Endocrine    Type 2 diabetes mellitus (HCC)    Relevant Medications    metFORMIN (GLUCOPHAGE) 500 MG tablet    insulin glargine (LANTUS) 100 UNIT/ML injection vial    empagliflozin (JARDIANCE) 25 MG tablet    Neuropathy due to type 2 diabetes mellitus (HCC)     Continue Lyrica, continue using cane to ambulate         Relevant Medications    metFORMIN (GLUCOPHAGE) 500 MG tablet    insulin glargine (LANTUS) 100 UNIT/ML injection vial    empagliflozin (JARDIANCE) 25 MG tablet       Nervous and Auditory    Neuropathy     Continue Lyrica            Genitourinary    Prostate CA ([address]     Follow-up with his urologist as directed         Relevant Medications    aspirin 81 MG tablet    pregabalin (LYRICA) 75 MG capsule    Stage 3 chronic kidney disease (HCC)     Avoid NSAID use, will refer to nephrology for further evaluation         Relevant Orders    External Referral To Nephrology       Other    Mixed hyperlipidemia     Continue atorvastatin 40 mg at bedtime and [facility] will monitor his lipids         Relevant Medications    lisinopril (PRINIVIL;ZESTRIL) 10 MG tablet    atorvastatin (LIPITOR) 40 MG tablet    aspirin 81 MG tablet    verapamil (CALAN SR) 180 MG extended release tablet    H/O asbestosis     Patient has no respiratory problems. We will continue to monitor         History of paroxysmal supraventricular tachycardia     Continue verapamil as directed by his cardiologist and see his cardiologist in September 2020                Return in about 3 months (around 6/16/2020).        Corrine Khan,   3/16/2020

## 2020-03-16 ENCOUNTER — TELEPHONE (OUTPATIENT)
Dept: PRIMARY CARE CLINIC | Age: 80
End: 2020-03-16

## 2020-03-16 ENCOUNTER — OFFICE VISIT (OUTPATIENT)
Dept: PRIMARY CARE CLINIC | Age: 80
End: 2020-03-16
Payer: MEDICARE

## 2020-03-16 VITALS
WEIGHT: 208 LBS | BODY MASS INDEX: 31.52 KG/M2 | HEART RATE: 94 BPM | TEMPERATURE: 97 F | OXYGEN SATURATION: 95 % | SYSTOLIC BLOOD PRESSURE: 136 MMHG | HEIGHT: 68 IN | DIASTOLIC BLOOD PRESSURE: 68 MMHG

## 2020-03-16 PROCEDURE — 4040F PNEUMOC VAC/ADMIN/RCVD: CPT | Performed by: INTERNAL MEDICINE

## 2020-03-16 PROCEDURE — G8482 FLU IMMUNIZE ORDER/ADMIN: HCPCS | Performed by: INTERNAL MEDICINE

## 2020-03-16 PROCEDURE — 99214 OFFICE O/P EST MOD 30 MIN: CPT | Performed by: INTERNAL MEDICINE

## 2020-03-16 PROCEDURE — G8427 DOCREV CUR MEDS BY ELIG CLIN: HCPCS | Performed by: INTERNAL MEDICINE

## 2020-03-16 PROCEDURE — 1123F ACP DISCUSS/DSCN MKR DOCD: CPT | Performed by: INTERNAL MEDICINE

## 2020-03-16 PROCEDURE — G8417 CALC BMI ABV UP PARAM F/U: HCPCS | Performed by: INTERNAL MEDICINE

## 2020-03-16 PROCEDURE — 1036F TOBACCO NON-USER: CPT | Performed by: INTERNAL MEDICINE

## 2020-03-16 ASSESSMENT — ENCOUNTER SYMPTOMS: BACK PAIN: 0

## 2020-03-16 NOTE — ASSESSMENT & PLAN NOTE
Blood pressures are stable. Continue medications and monitor blood pressures at home. Call office if systolics are over 860 over diastolics over 90.

## 2020-06-16 ENCOUNTER — OFFICE VISIT (OUTPATIENT)
Dept: PRIMARY CARE CLINIC | Age: 80
End: 2020-06-16
Payer: MEDICARE

## 2020-06-16 VITALS
BODY MASS INDEX: 31.52 KG/M2 | HEIGHT: 68 IN | OXYGEN SATURATION: 97 % | SYSTOLIC BLOOD PRESSURE: 128 MMHG | DIASTOLIC BLOOD PRESSURE: 60 MMHG | HEART RATE: 66 BPM | TEMPERATURE: 98.3 F | WEIGHT: 208 LBS

## 2020-06-16 PROCEDURE — 4040F PNEUMOC VAC/ADMIN/RCVD: CPT | Performed by: INTERNAL MEDICINE

## 2020-06-16 PROCEDURE — 1123F ACP DISCUSS/DSCN MKR DOCD: CPT | Performed by: INTERNAL MEDICINE

## 2020-06-16 PROCEDURE — G8427 DOCREV CUR MEDS BY ELIG CLIN: HCPCS | Performed by: INTERNAL MEDICINE

## 2020-06-16 PROCEDURE — 1036F TOBACCO NON-USER: CPT | Performed by: INTERNAL MEDICINE

## 2020-06-16 PROCEDURE — 3051F HG A1C>EQUAL 7.0%<8.0%: CPT | Performed by: INTERNAL MEDICINE

## 2020-06-16 PROCEDURE — 99214 OFFICE O/P EST MOD 30 MIN: CPT | Performed by: INTERNAL MEDICINE

## 2020-06-16 PROCEDURE — G8417 CALC BMI ABV UP PARAM F/U: HCPCS | Performed by: INTERNAL MEDICINE

## 2020-06-16 ASSESSMENT — ENCOUNTER SYMPTOMS
COLOR CHANGE: 0
SHORTNESS OF BREATH: 0
RHINORRHEA: 0
VOMITING: 0
PHOTOPHOBIA: 0
TROUBLE SWALLOWING: 0
BACK PAIN: 0
DIARRHEA: 0
FACIAL SWELLING: 0
SORE THROAT: 0
CONSTIPATION: 0
STRIDOR: 0
ANAL BLEEDING: 0
BLOOD IN STOOL: 0
NAUSEA: 0
EYE DISCHARGE: 0
WHEEZING: 0
EYE PAIN: 0
EYE ITCHING: 0
ABDOMINAL PAIN: 0
COUGH: 0

## 2020-06-16 ASSESSMENT — PATIENT HEALTH QUESTIONNAIRE - PHQ9
1. LITTLE INTEREST OR PLEASURE IN DOING THINGS: 0
SUM OF ALL RESPONSES TO PHQ QUESTIONS 1-9: 0
SUM OF ALL RESPONSES TO PHQ9 QUESTIONS 1 & 2: 0
SUM OF ALL RESPONSES TO PHQ QUESTIONS 1-9: 0
2. FEELING DOWN, DEPRESSED OR HOPELESS: 0

## 2020-06-16 NOTE — PROGRESS NOTES
2020    Name: Ruddy Suarez : 1940 Sex: male  Age: 70 y.o. Subjective:  Chief Complaint   Patient presents with    Hypertension        Hypertension   Pertinent negatives include no chest pain, headaches, palpitations or shortness of breath. He will be seeing the [facility] doctor in the near future and blood work will be done. Last blood work reviewed from 2020 showed his urine microalbumin was good. Urinalysis unremarkable. CBC normal.  Estimated GFR still low at 45. With a creatinine of 1.5 and a BUN of 27. Lipids were good with the exception of triglycerides of 205. Thyroid was normal.  Hemoglobin A1c in 2020 was 7.7%. .    Patient has had increased pain in his low back radiating down his legs. He has known diabetic neuropathy but this is much worse than before. MRI of his spine revealed spinal stenosis at multiple levels of his lumbar spine. He had a herniated disc at L4- L5 level. He also had spondylolisthesis L4-L5 with instability in flexion and significant stenosis at this level. He was referred to Dr. Kaveh Harris who did surgery on . Christal Ashraf Since surgery his pain is markedly improved. He is doing much better ambulating. He still unsteady more from his diabetic neuropathy. He is a patient at the [facility] and they manage his insulin  His blood sugars have been dropping so they discontinued his NovoLog mealtime insulin but kept him on Lantus 24 units at bedtime. He had no low blood sugar spells. .      In 2019 on [facility] recommendation he discontinued his CPAP. He saw his cardiologist Dr. Fernandez Gilliam in September and he started him on Jardiance. Since then he is noticed a 10 pound weight loss. Diabetic eye examination was done by Dr. So Breaux in 2020. He has glaucoma, he also has nonproliferative diabetic retinopathy. Diabetic foot examination done by Dr. Mateusz Willett in 2020.     Past medical history includes small range of motion and neck supple. Thyroid: No thyromegaly. Cardiovascular:      Rate and Rhythm: Normal rate and regular rhythm. Heart sounds: Normal heart sounds. No murmur. No friction rub. No gallop. Pulmonary:      Effort: Pulmonary effort is normal. No respiratory distress. Breath sounds: Normal breath sounds. No wheezing or rales. Chest:      Chest wall: No tenderness. Abdominal:      General: Bowel sounds are normal. There is no distension. Palpations: Abdomen is soft. There is no mass. Tenderness: There is no abdominal tenderness. There is no guarding or rebound. Comments: Protruberant   Musculoskeletal:         General: No tenderness or deformity. Comments:   Ambulates with  a cane   Lymphadenopathy:      Cervical: No cervical adenopathy. Skin:     General: Skin is warm and dry. Coloration: Skin is not pale. Findings: No erythema or rash. Neurological:      Mental Status: He is alert and oriented to person, place, and time. Cranial Nerves: No cranial nerve deficit. Sensory: Sensory deficit present. Deep Tendon Reflexes: Reflexes normal.      Comments: Decreased sensation to touch below the knee   Psychiatric:         Behavior: Behavior normal.         Thought Content: Thought content normal.         Judgment: Judgment normal.          Last labs reviewed. ASSESSMENT & PLAN :   Problem List        Circulatory    Essential hypertension - Primary     Blood pressures are stable. Continue medications and monitor blood pressures at home. Call office if systolics are over 394 over diastolics over 90.          Relevant Orders    Comprehensive Metabolic Panel       Respiratory    Sleep apnea with use of continuous positive airway pressure (CPAP)     No longer uses CPAP per [place] instruction            Endocrine    Type 2 diabetes mellitus (HCC)    Relevant Medications    metFORMIN (GLUCOPHAGE) 500 MG tablet    insulin glargine (LANTUS) 100 UNIT/ML injection vial    empagliflozin (JARDIANCE) 25 MG tablet    Other Relevant Orders    Hemoglobin A1C    Microalbumin / Creatinine Urine Ratio    Neuropathy due to type 2 diabetes mellitus (HCC)    Relevant Medications    metFORMIN (GLUCOPHAGE) 500 MG tablet    insulin glargine (LANTUS) 100 UNIT/ML injection vial    empagliflozin (JARDIANCE) 25 MG tablet       Genitourinary    Prostate CA (HCC)    Relevant Medications    aspirin 81 MG tablet    pregabalin (LYRICA) 75 MG capsule    Stage 3 chronic kidney disease (HCC)     Avoid NSAID's and will monitor kidney functions         Relevant Orders    Comprehensive Metabolic Panel    CBC Auto Differential       Other    Mixed hyperlipidemia     Watch saturated fats in diet and will monitor lipids         Relevant Medications    lisinopril (PRINIVIL;ZESTRIL) 10 MG tablet    atorvastatin (LIPITOR) 40 MG tablet    aspirin 81 MG tablet    verapamil (CALAN SR) 180 MG extended release tablet    Other Relevant Orders    Lipid Panel           Return in about 3 months (around 9/16/2020).        Theresa Justin DO  6/16/2020

## 2020-06-16 NOTE — ASSESSMENT & PLAN NOTE
Blood pressures are stable. Continue medications and monitor blood pressures at home. Call office if systolics are over 225 over diastolics over 90.

## 2020-06-17 ENCOUNTER — HOSPITAL ENCOUNTER (OUTPATIENT)
Age: 80
Discharge: HOME | End: 2020-06-19
Payer: MEDICARE

## 2020-06-17 LAB
ALBUMIN SERPL-MCNC: 4.4 G/DL (ref 3.5–5.2)
ALP BLD-CCNC: 75 U/L (ref 40–129)
ALT SERPL-CCNC: 25 U/L (ref 0–40)
ANION GAP SERPL CALCULATED.3IONS-SCNC: 17 MMOL/L (ref 7–16)
AST SERPL-CCNC: 31 U/L (ref 0–39)
BASOPHILS ABSOLUTE: 0.06 E9/L (ref 0–0.2)
BASOPHILS RELATIVE PERCENT: 0.9 % (ref 0–2)
BILIRUB SERPL-MCNC: 2 MG/DL (ref 0–1.2)
BUN BLDV-MCNC: 33 MG/DL (ref 8–23)
CALCIUM SERPL-MCNC: 10 MG/DL (ref 8.6–10.2)
CHLORIDE BLD-SCNC: 98 MMOL/L (ref 98–107)
CHOLESTEROL, TOTAL: 121 MG/DL (ref 0–199)
CO2: 24 MMOL/L (ref 22–29)
CREAT SERPL-MCNC: 1.4 MG/DL (ref 0.7–1.2)
CREATININE URINE: 41 MG/DL (ref 40–278)
EOSINOPHILS ABSOLUTE: 0.19 E9/L (ref 0.05–0.5)
EOSINOPHILS RELATIVE PERCENT: 2.7 % (ref 0–6)
GFR AFRICAN AMERICAN: 59
GFR NON-AFRICAN AMERICAN: 49 ML/MIN/1.73
GLUCOSE BLD-MCNC: 152 MG/DL (ref 74–99)
HBA1C MFR BLD: 8.6 % (ref 4–5.6)
HCT VFR BLD CALC: 47.2 % (ref 37–54)
HDLC SERPL-MCNC: 46 MG/DL
HEMOGLOBIN: 14.9 G/DL (ref 12.5–16.5)
IMMATURE GRANULOCYTES #: 0.03 E9/L
IMMATURE GRANULOCYTES %: 0.4 % (ref 0–5)
LDL CHOLESTEROL CALCULATED: 45 MG/DL (ref 0–99)
LYMPHOCYTES ABSOLUTE: 1.52 E9/L (ref 1.5–4)
LYMPHOCYTES RELATIVE PERCENT: 21.7 % (ref 20–42)
MCH RBC QN AUTO: 30.5 PG (ref 26–35)
MCHC RBC AUTO-ENTMCNC: 31.6 % (ref 32–34.5)
MCV RBC AUTO: 96.5 FL (ref 80–99.9)
MICROALBUMIN UR-MCNC: 48.5 MG/L
MICROALBUMIN/CREAT UR-RTO: 118.3 (ref 0–30)
MONOCYTES ABSOLUTE: 0.64 E9/L (ref 0.1–0.95)
MONOCYTES RELATIVE PERCENT: 9.1 % (ref 2–12)
NEUTROPHILS ABSOLUTE: 4.57 E9/L (ref 1.8–7.3)
NEUTROPHILS RELATIVE PERCENT: 65.2 % (ref 43–80)
PDW BLD-RTO: 14.9 FL (ref 11.5–15)
PLATELET # BLD: 254 E9/L (ref 130–450)
PMV BLD AUTO: 10.7 FL (ref 7–12)
POTASSIUM SERPL-SCNC: 4.6 MMOL/L (ref 3.5–5)
RBC # BLD: 4.89 E12/L (ref 3.8–5.8)
SODIUM BLD-SCNC: 139 MMOL/L (ref 132–146)
TOTAL PROTEIN: 8 G/DL (ref 6.4–8.3)
TRIGL SERPL-MCNC: 149 MG/DL (ref 0–149)
VLDLC SERPL CALC-MCNC: 30 MG/DL
WBC # BLD: 7 E9/L (ref 4.5–11.5)

## 2020-06-17 PROCEDURE — 80053 COMPREHEN METABOLIC PANEL: CPT

## 2020-06-17 PROCEDURE — 80061 LIPID PANEL: CPT

## 2020-06-17 PROCEDURE — 83036 HEMOGLOBIN GLYCOSYLATED A1C: CPT

## 2020-06-17 PROCEDURE — 82044 UR ALBUMIN SEMIQUANTITATIVE: CPT

## 2020-06-17 PROCEDURE — 85025 COMPLETE CBC W/AUTO DIFF WBC: CPT

## 2020-06-17 PROCEDURE — 36415 COLL VENOUS BLD VENIPUNCTURE: CPT

## 2020-06-17 PROCEDURE — 82570 ASSAY OF URINE CREATININE: CPT

## 2020-09-16 ENCOUNTER — OFFICE VISIT (OUTPATIENT)
Dept: PRIMARY CARE CLINIC | Age: 80
End: 2020-09-16
Payer: MEDICARE

## 2020-09-16 VITALS
HEIGHT: 68 IN | DIASTOLIC BLOOD PRESSURE: 64 MMHG | HEART RATE: 80 BPM | TEMPERATURE: 97.4 F | SYSTOLIC BLOOD PRESSURE: 128 MMHG | OXYGEN SATURATION: 94 % | BODY MASS INDEX: 31.63 KG/M2 | RESPIRATION RATE: 16 BRPM

## 2020-09-16 PROCEDURE — G8427 DOCREV CUR MEDS BY ELIG CLIN: HCPCS | Performed by: INTERNAL MEDICINE

## 2020-09-16 PROCEDURE — G8417 CALC BMI ABV UP PARAM F/U: HCPCS | Performed by: INTERNAL MEDICINE

## 2020-09-16 PROCEDURE — 99213 OFFICE O/P EST LOW 20 MIN: CPT | Performed by: INTERNAL MEDICINE

## 2020-09-16 PROCEDURE — 4040F PNEUMOC VAC/ADMIN/RCVD: CPT | Performed by: INTERNAL MEDICINE

## 2020-09-16 PROCEDURE — 1123F ACP DISCUSS/DSCN MKR DOCD: CPT | Performed by: INTERNAL MEDICINE

## 2020-09-16 PROCEDURE — 3052F HG A1C>EQUAL 8.0%<EQUAL 9.0%: CPT | Performed by: INTERNAL MEDICINE

## 2020-09-16 PROCEDURE — 1036F TOBACCO NON-USER: CPT | Performed by: INTERNAL MEDICINE

## 2020-09-16 RX ORDER — AMOXICILLIN 500 MG
1200 CAPSULE ORAL
COMMUNITY

## 2020-09-16 ASSESSMENT — ENCOUNTER SYMPTOMS
EYE DISCHARGE: 0
PHOTOPHOBIA: 0
SHORTNESS OF BREATH: 0
BLOOD IN STOOL: 0
CONSTIPATION: 0
EYE PAIN: 0
TROUBLE SWALLOWING: 0
ANAL BLEEDING: 0
VOMITING: 0
NAUSEA: 0
SORE THROAT: 0
ABDOMINAL PAIN: 0
DIARRHEA: 0
BACK PAIN: 0
COLOR CHANGE: 0
FACIAL SWELLING: 0
STRIDOR: 0
WHEEZING: 0
COUGH: 0
EYE ITCHING: 0
RHINORRHEA: 0

## 2020-09-16 NOTE — PROGRESS NOTES
improved. He is doing much better ambulating. He still unsteady more from his diabetic neuropathy. He is a patient at the [place] and they manage his insulin  His blood sugars have been dropping so they discontinued his NovoLog mealtime insulin but kept him on Lantus 33 units at bedtime. He had no low blood sugar spells. .      In October 2019 on [place] recommendation he discontinued his CPAP. As patient refused to use it every night. .    Diabetic eye examination was done by Dr. Bhakta in February 2020. He has glaucoma, he also has nonproliferative diabetic retinopathy. Diabetic foot examination done by Dr. Irasema Mccall in February 2020. 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 No residual neurological deficit  . He has a history of hypertension, type 2 diabetes mellitus on insulin. Diabetic neuropathy. Diabetic gastroparesis. Hyperlipidemia, obstructive sleep apnea not on CPAP. Past history of prostate cancer under the care of Dr. Eli Sousa saw him this year. His PSA had risen a little bit to 5.23. He has had apparently some other procedure on his prostate with him. He will be going back soon    . He has a history of supraventricular tachycardia under the care of Dr. Gina Davis. He last saw Dr. Gina Davis on September 11, 2019 at which time an EKG revealed sinus rhythm with nonspecific intraventricular conduction delay ,     He last saw his eye doctor Dr. Magdaleno Kothari  in early 2020. 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 Review of Systems   Constitutional: Negative for appetite change, fatigue and unexpected weight change. HENT: Negative for congestion, ear pain, facial swelling, rhinorrhea, sore throat, tinnitus and trouble swallowing. Eyes: Negative for photophobia, pain, discharge, itching and visual disturbance. Respiratory: Negative for cough, shortness of breath, wheezing and stridor.     Cardiovascular: Negative for chest pain, palpitations and leg swelling. Gastrointestinal: Negative for abdominal pain, anal bleeding, blood in stool, constipation, diarrhea, nausea and vomiting. Endocrine: Negative for cold intolerance, heat intolerance, polydipsia, polyphagia and polyuria. Genitourinary: Negative for difficulty urinating, dysuria, flank pain, frequency, hematuria and urgency. Musculoskeletal: Positive for gait problem. Negative for arthralgias, back pain, joint swelling and myalgias. Skin: Negative for color change, pallor and rash. Allergic/Immunologic: Negative for environmental allergies and food allergies. Neurological: Positive for weakness and numbness. Negative for dizziness, tremors, seizures, syncope, speech difficulty, light-headedness and headaches. Vertigo   Hematological: Negative for adenopathy. Does not bruise/bleed easily. Psychiatric/Behavioral: Negative for agitation, behavioral problems, confusion, sleep disturbance and suicidal ideas. The patient is not nervous/anxious. Current Outpatient Medications:     Omega-3 Fatty Acids (FISH OIL) 1200 MG CAPS, Take 1,200 mg by mouth, Disp: , Rfl:     metFORMIN (GLUCOPHAGE) 1000 MG tablet, Take 1,000 mg by mouth daily (with breakfast), Disp: , Rfl:     empagliflozin (JARDIANCE) 25 MG tablet, Take 25 mg by mouth daily, Disp: , Rfl:     Rosuvastatin Calcium 20 MG CPSP, Take by mouth daily, Disp: , Rfl:     Misc. Devices (ALL-BODY MASSAGE) MISC, Massage as needed for generalized body pain, Disp: 1 each, Rfl: 5    timolol (TIMOPTIC) 0.5 % ophthalmic solution, , Disp: , Rfl:     HM OMEGA-3-6-9 FATTY ACIDS CAPS, Take 1,200 mg by mouth daily, Disp: , Rfl:     pregabalin (LYRICA) 75 MG capsule, Take 75 mg by mouth 2 times daily. , Disp: , Rfl:     lisinopril (PRINIVIL;ZESTRIL) 10 MG tablet, Take 10 mg by mouth daily, Disp: , Rfl:     clopidogrel (PLAVIX) 75 MG tablet, Take 75 mg by mouth daily, Disp: , Rfl:     vitamin D (CHOLECALCIFEROL) 1000 UNIT TABS tablet, Take 1,000 Units by mouth daily, Disp: , Rfl:     Multiple Vitamins-Minerals (MULTI FOR HIM PO), Take by mouth daily, Disp: , Rfl:     aspirin 81 MG tablet, Take 81 mg by mouth 2 times daily , Disp: , Rfl:     verapamil (CALAN SR) 180 MG extended release tablet, Take 180 mg by mouth nightly, Disp: , Rfl:     insulin glargine (LANTUS) 100 UNIT/ML injection vial, Inject 34 Units into the skin As directed , Disp: , Rfl:      No Known Allergies     Past Medical History:   Diagnosis Date    [facility] ([facility] [address]     Gastroparesis     H/O asbestosis     History of paroxysmal supraventricular tachycardia 6/11/2019    Neuropathy     Prostate CA (HCC)     Sleep apnea with use of continuous positive airway pressure (CPAP)     TIA (transient ischemic attack)        Health Maintenance Due   Topic Date Due    DTaP/Tdap/Td vaccine (1 - Tdap) 11/27/1961    PSA counseling  11/27/1992    Flu vaccine (1) 09/01/2020        Patient Active Problem List   Diagnosis    Essential hypertension    Type 2 diabetes mellitus ([facility] [address]    CVA (cerebral vascular accident) ([facility] [address]    Neuropathy due to type 2 diabetes mellitus ([facility] [address]    Sleep apnea with use of continuous positive airway pressure (CPAP)    Mixed hyperlipidemia    H/O asbestosis    Prostate CA ([facility] [address]    Lumbar and sacral osteoarthritis    History of paroxysmal supraventricular tachycardia    Neuropathy    Need for diphtheria-tetanus-pertussis (Tdap) vaccine    Vertigo    Stage 3 chronic kidney disease (HCC)        Past Surgical History:   Procedure Laterality Date    CERVICAL DISC SURGERY      CHOLECYSTECTOMY      KNEE ARTHROPLASTY      PROSTATE BIOPSY          Family History   Problem Relation Age of Onset    Dementia Mother         Social History     Tobacco Use    Smoking status: Never Smoker    Smokeless tobacco: Never Used   Substance Use Topics    Alcohol use: Not Currently     Frequency: Never    Drug use: Never Objective  Vitals:    09/16/20 0839   BP: 128/64   Pulse: 80   Resp: 16   Temp: 97.4 °F (36.3 °C)   SpO2: 94%   Height: 5' 8\" (1.727 m)        Exam:  Physical Exam  Vitals signs reviewed. Constitutional:       General: He is not in acute distress. Appearance: Normal appearance. He is well-developed. He is obese. He is not ill-appearing. HENT:      Head: Normocephalic. Right Ear: External ear normal.      Left Ear: External ear normal.   Eyes:      General: No scleral icterus. Right eye: No discharge. Left eye: No discharge. Conjunctiva/sclera: Conjunctivae normal.      Pupils: Pupils are equal, round, and reactive to light. Neck:      Musculoskeletal: Normal range of motion and neck supple. Thyroid: No thyromegaly. Cardiovascular:      Rate and Rhythm: Normal rate and regular rhythm. Heart sounds: Normal heart sounds. No murmur. No friction rub. No gallop. Pulmonary:      Effort: Pulmonary effort is normal. No respiratory distress. Breath sounds: Normal breath sounds. No wheezing or rales. Chest:      Chest wall: No tenderness. Abdominal:      General: Bowel sounds are normal. There is no distension. Palpations: Abdomen is soft. There is no mass. Tenderness: There is no abdominal tenderness. There is no guarding or rebound. Comments: Protruberant   Musculoskeletal:         General: No tenderness or deformity. Comments:   Ambulates with  a cane   Lymphadenopathy:      Cervical: No cervical adenopathy. Skin:     General: Skin is warm and dry. Coloration: Skin is not pale. Findings: No erythema or rash. Neurological:      Mental Status: He is alert and oriented to person, place, and time. Cranial Nerves: No cranial nerve deficit. Sensory: Sensory deficit present.       Deep Tendon Reflexes: Reflexes normal.      Comments: Decreased sensation to touch below the knee   Psychiatric:         Behavior: Behavior normal.         Thought Content: Thought content normal.         Judgment: Judgment normal.          Last labs reviewed. ASSESSMENT & PLAN :   Problem List        Circulatory    Essential hypertension - Primary     Blood pressures are stable. Continue medications and monitor blood pressures at home. Call office if systolics are over 757 over diastolics over 90. CVA (cerebral vascular accident) ([address]     No residual deficit. Remains on Plavix and aspirin            Endocrine    Type 2 diabetes mellitus (HCC)     Adjustments in medications as per Dr. Augustine Ybarra         Relevant Medications    insulin glargine (LANTUS) 100 UNIT/ML injection vial    metFORMIN (GLUCOPHAGE) 1000 MG tablet    empagliflozin (JARDIANCE) 25 MG tablet    Neuropathy due to type 2 diabetes mellitus (HCC)     Continue Lyrica         Relevant Medications    insulin glargine (LANTUS) 100 UNIT/ML injection vial    metFORMIN (GLUCOPHAGE) 1000 MG tablet    empagliflozin (JARDIANCE) 25 MG tablet       Genitourinary    Prostate CA (HCC)     Follow-up with          Relevant Medications    aspirin 81 MG tablet    pregabalin (LYRICA) 75 MG capsule    Stage 3 chronic kidney disease (HCC)     Avoid NSAIDs and will continue to monitor            Other    Mixed hyperlipidemia     Change from atorvastatin to rosuvastatin 20 mg as per cardiology         Relevant Medications    lisinopril (PRINIVIL;ZESTRIL) 10 MG tablet    aspirin 81 MG tablet    verapamil (CALAN SR) 180 MG extended release tablet    Rosuvastatin Calcium 20 MG CPSP    H/O asbestosis     Patient says he is not short of breath and does not want any further follow-up at this time         History of paroxysmal supraventricular tachycardia     No recent episodes, continue his verapamil                Return in about 3 months (around 12/16/2020), or AWV and diabetes.        Graciela Gilmore,   9/16/2020

## 2020-09-16 NOTE — ASSESSMENT & PLAN NOTE
Blood pressures are stable. Continue medications and monitor blood pressures at home. Call office if systolics are over 229 over diastolics over 90.

## 2020-12-16 ENCOUNTER — OFFICE VISIT (OUTPATIENT)
Dept: PRIMARY CARE CLINIC | Age: 80
End: 2020-12-16
Payer: MEDICARE

## 2020-12-16 ENCOUNTER — TELEPHONE (OUTPATIENT)
Dept: PRIMARY CARE CLINIC | Age: 80
End: 2020-12-16

## 2020-12-16 VITALS
SYSTOLIC BLOOD PRESSURE: 136 MMHG | HEIGHT: 68 IN | DIASTOLIC BLOOD PRESSURE: 78 MMHG | HEART RATE: 77 BPM | WEIGHT: 196 LBS | OXYGEN SATURATION: 98 % | BODY MASS INDEX: 29.7 KG/M2 | TEMPERATURE: 97.2 F

## 2020-12-16 LAB — HBA1C MFR BLD: 8.6 %

## 2020-12-16 PROCEDURE — 3052F HG A1C>EQUAL 8.0%<EQUAL 9.0%: CPT | Performed by: INTERNAL MEDICINE

## 2020-12-16 PROCEDURE — 99213 OFFICE O/P EST LOW 20 MIN: CPT | Performed by: INTERNAL MEDICINE

## 2020-12-16 PROCEDURE — 83036 HEMOGLOBIN GLYCOSYLATED A1C: CPT | Performed by: INTERNAL MEDICINE

## 2020-12-16 PROCEDURE — G8482 FLU IMMUNIZE ORDER/ADMIN: HCPCS | Performed by: INTERNAL MEDICINE

## 2020-12-16 PROCEDURE — 1036F TOBACCO NON-USER: CPT | Performed by: INTERNAL MEDICINE

## 2020-12-16 PROCEDURE — G8417 CALC BMI ABV UP PARAM F/U: HCPCS | Performed by: INTERNAL MEDICINE

## 2020-12-16 PROCEDURE — 1123F ACP DISCUSS/DSCN MKR DOCD: CPT | Performed by: INTERNAL MEDICINE

## 2020-12-16 PROCEDURE — 4040F PNEUMOC VAC/ADMIN/RCVD: CPT | Performed by: INTERNAL MEDICINE

## 2020-12-16 PROCEDURE — G8427 DOCREV CUR MEDS BY ELIG CLIN: HCPCS | Performed by: INTERNAL MEDICINE

## 2020-12-16 RX ORDER — CYCLOSPORINE 0.5 MG/ML
EMULSION OPHTHALMIC
COMMUNITY
End: 2022-04-19

## 2020-12-16 RX ORDER — MAGNESIUM OXIDE 420 MG
TABLET ORAL
COMMUNITY
Start: 2020-08-31

## 2020-12-16 ASSESSMENT — ENCOUNTER SYMPTOMS
NAUSEA: 0
EYE PAIN: 0
DIARRHEA: 0
COUGH: 0
COLOR CHANGE: 0
RHINORRHEA: 0
WHEEZING: 0
BACK PAIN: 0
VOMITING: 0
SHORTNESS OF BREATH: 0
BLOOD IN STOOL: 0
EYE ITCHING: 0
STRIDOR: 0
ANAL BLEEDING: 0
EYE DISCHARGE: 0
TROUBLE SWALLOWING: 0
ABDOMINAL PAIN: 0
SORE THROAT: 0
FACIAL SWELLING: 0
CONSTIPATION: 0
PHOTOPHOBIA: 0

## 2020-12-16 NOTE — ASSESSMENT & PLAN NOTE
Blood pressures are stable. Continue medications and monitor blood pressures at home. Call office if systolics are over 085 over diastolics over 90.

## 2020-12-16 NOTE — PROGRESS NOTES
2020    Name: Olga Botello : 1940 Sex: male  Age: 68 y.o. Subjective:  Chief Complaint   Patient presents with    Hypertension        Hypertension  Pertinent negatives include no chest pain, headaches, palpitations or shortness of breath. He saw the [place] doctor and blood work was done. Last blood work reviewed from   showed his urine microalbumin/creatinine ratio was elevated at 123.9. Vitamin D was normal.  Hemoglobin A1c was still too high at 8.2%. He is not anemic. Fasting blood sugar 132. BUN 27, creatinine 1.6 estimated GFR 42, liver enzymes normal with exception of total bilirubin of 2.1. Serum cholesterol is 128 with an LDL cholesterol of 67 HDL cholesterol 47 and triglycerides of 181. Vitamin B12 was 342. Free T4 TSH were normal, prostate-specific antigen was slightly elevated at 5.2. Urinalysis unremarkable. CBC normal.    He was at the [place] in 2020 for his ears and they gave him a flu shot and did some blood work for his kidneys. We will get copy of that report. He has an appointment this afternoon for PSA to be done for .  He will be seeing him in the near future. He saw Dr. Angelina Reyes last week for his diabetic eye examination. He saw Dr. Calderon Flavin his cardiologist who wanted to adjust some of his medications because of the above numbers. He wants his LDL cholesterol to be below 55 as Lachelle Neville is a diabetic. He is wants to switch him from atorvastatin 40 mg to rosuvastatin 20 mg. He was given a prescription for this. He was also told to increase his Jardiance from 12.5 mg to 25 mg a day and because of his decreasing kidney functions cut back on his metformin to 1000 mg once a day instead of twice a day. Lachelle Neville knows he needs to watch his diet more closely than he has been. .    Patient has had increased pain in his low back radiating down his legs. He has known diabetic neuropathy but this is much worse than before.   MRI of his spine revealed stridor. Cardiovascular: Negative for chest pain, palpitations and leg swelling. Gastrointestinal: Negative for abdominal pain, anal bleeding, blood in stool, constipation, diarrhea, nausea and vomiting. Endocrine: Negative for cold intolerance, heat intolerance, polydipsia, polyphagia and polyuria. Genitourinary: Negative for difficulty urinating, dysuria, flank pain, frequency, hematuria and urgency. Musculoskeletal: Positive for gait problem. Negative for arthralgias, back pain, joint swelling and myalgias. Skin: Negative for color change, pallor and rash. Allergic/Immunologic: Negative for environmental allergies and food allergies. Neurological: Positive for weakness and numbness. Negative for dizziness, tremors, seizures, syncope, speech difficulty, light-headedness and headaches. Vertigo   Hematological: Negative for adenopathy. Does not bruise/bleed easily. Psychiatric/Behavioral: Negative for agitation, behavioral problems, confusion, sleep disturbance and suicidal ideas. The patient is not nervous/anxious. Current Outpatient Medications:     Magnesium Oxide 420 MG TABS, TAKE ONE TABLET BY MOUTH TWICE A DAY, Disp: , Rfl:     Omega-3 Fatty Acids (FISH OIL) 1200 MG CAPS, Take 1,200 mg by mouth, Disp: , Rfl:     metFORMIN (GLUCOPHAGE) 1000 MG tablet, Take 1,000 mg by mouth daily (with breakfast), Disp: , Rfl:     empagliflozin (JARDIANCE) 25 MG tablet, Take 25 mg by mouth daily, Disp: , Rfl:     Rosuvastatin Calcium 20 MG CPSP, Take by mouth daily, Disp: , Rfl:     Misc. Devices (ALL-BODY MASSAGE) MISC, Massage as needed for generalized body pain, Disp: 1 each, Rfl: 5    timolol (TIMOPTIC) 0.5 % ophthalmic solution, , Disp: , Rfl:     HM OMEGA-3-6-9 FATTY ACIDS CAPS, Take 1,200 mg by mouth daily, Disp: , Rfl:     pregabalin (LYRICA) 75 MG capsule, Take 75 mg by mouth 2 times daily. , Disp: , Rfl:     lisinopril (PRINIVIL;ZESTRIL) 10 MG tablet, Take 10 mg by mouth daily, Disp: , Rfl:     clopidogrel (PLAVIX) 75 MG tablet, Take 75 mg by mouth daily, Disp: , Rfl:     vitamin D (CHOLECALCIFEROL) 1000 UNIT TABS tablet, Take 1,000 Units by mouth daily, Disp: , Rfl:     Multiple Vitamins-Minerals (MULTI FOR HIM PO), Take by mouth daily, Disp: , Rfl:     aspirin 81 MG tablet, Take 81 mg by mouth 2 times daily , Disp: , Rfl:     verapamil (CALAN SR) 180 MG extended release tablet, Take 180 mg by mouth nightly, Disp: , Rfl:     insulin glargine (LANTUS) 100 UNIT/ML injection vial, Inject 34 Units into the skin As directed , Disp: , Rfl:     cycloSPORINE (RESTASIS) 0.05 % ophthalmic emulsion, cycloSPORINE Restasis 0.05 drops 2 times per day O[facility] Active   [facility] ([id number]), Disp: , Rfl:      No Known Allergies     Past Medical History:   Diagnosis Date    [facility]cere[facility]) [facility] [address]     Gastroparesis     H/O asbestosis     History of paroxysmal supraventricular tachycardia 6/11/2019    Neuropathy     Prostate CA ([facility] [address] [facility]     Sleep apnea with use of continuous positive airway pressure (CPAP)     TIA (transient ischemic attack)        Health Maintenance Due   Topic Date Due    DTaP/Tdap/Td vaccine (1 - Tdap) 11/27/1961    PSA counseling  11/27/1992    Annual Wellness Visit (AWV)  12/16/2020        Patient Active Problem List   Diagnosis    Essential hypertension    Type 2 diabetes mellitus ([facility] [address] [facility]    CVA (cerebral vascular accident) ([facility] [address] [facility]    Neuropathy due to type 2 diabetes mellitus ([facility] [address] [facility]    Sleep apnea with use of continuous positive airway pressure (CPAP)    Mixed hyperlipidemia    H/O asbestosis    Prostate CA ([facility] [address]    Lumbar and sacral osteoarthritis    History of paroxysmal supraventricular tachycardia    Neuropathy    Need for diphtheria-tetanus-pertussis (Tdap) vaccine    Vertigo    Stage 3 chronic kidney disease        Past Surgical History:   Procedure Laterality Date    CERVICAL DISC SURGERY      CHOLECYSTECTOMY      KNEE ARTHROPLASTY      PROSTATE BIOPSY          Family History   Problem Relation Age of Onset    Dementia Mother         Social History     Tobacco Use    Smoking status: Never Smoker    Smokeless tobacco: Never Used   Substance Use Topics    Alcohol use: Not Currently     Frequency: Never    Drug use: Never        Objective  Vitals:    12/16/20 0933   BP: 136/78   Site: Right Upper Arm   Position: Sitting   Cuff Size: Medium Adult   Pulse: 77   Temp: 97.2 °F (36.2 °C)   TempSrc: Temporal   SpO2: 98%   Weight: 196 lb (88.9 kg)   Height: 5' 8\" (1.727 m)        Exam:  Physical Exam  Vitals signs reviewed. Constitutional:       General: He is not in acute distress. Appearance: Normal appearance. He is well-developed. He is obese. He is not ill-appearing. HENT:      Head: Normocephalic. Right Ear: External ear normal.      Left Ear: External ear normal.   Eyes:      General: No scleral icterus. Right eye: No discharge. Left eye: No discharge. Conjunctiva/sclera: Conjunctivae normal.      Pupils: Pupils are equal, round, and reactive to light. Neck:      Musculoskeletal: Normal range of motion and neck supple. Thyroid: No thyromegaly. Cardiovascular:      Rate and Rhythm: Normal rate and regular rhythm. Heart sounds: Normal heart sounds. No murmur. No friction rub. No gallop. Pulmonary:      Effort: Pulmonary effort is normal. No respiratory distress. Breath sounds: Normal breath sounds. No wheezing or rales. Chest:      Chest wall: No tenderness. Abdominal:      General: Bowel sounds are normal. There is no distension. Palpations: Abdomen is soft. There is no mass. Tenderness: There is no abdominal tenderness. There is no guarding or rebound. Comments: Protruberant   Musculoskeletal:         General: No tenderness or deformity. Comments:   Ambulates with  a cane   Lymphadenopathy:      Cervical: No cervical adenopathy.    Skin: General: Skin is warm and dry. Coloration: Skin is not pale. Findings: No erythema or rash. Neurological:      Mental Status: He is alert and oriented to person, place, and time. Cranial Nerves: No cranial nerve deficit. Sensory: Sensory deficit present. Deep Tendon Reflexes: Reflexes normal.      Comments: Decreased sensation to touch below the knee   Psychiatric:         Behavior: Behavior normal.         Thought Content: Thought content normal.         Judgment: Judgment normal.          Last labs reviewed. ASSESSMENT & PLAN :   Problem List        Circulatory    Essential hypertension - Primary     Blood pressures are stable. Continue medications and monitor blood pressures at home. Call office if systolics are over 769 over diastolics over 90. Respiratory    Sleep apnea with use of continuous positive airway pressure (CPAP)     Patient no longer on CPAP machine as he refuses to use it every night            Endocrine    Type 2 diabetes mellitus ([address]     Poorly controlled. Hemoglobin A1c today was still high at 8.6%. [facility]   I wonder if he might benefit from Trulicity in addition to his Metformin Jardiance and Lantus         Relevant Medications    insulin glargine (LANTUS) 100 UNIT/ML injection vial    metFORMIN (GLUCOPHAGE) 1000 MG tablet    empagliflozin (JARDIANCE) 25 MG tablet    Other Relevant Orders    POCT glycosylated hemoglobin (Hb A1C) (Completed)    Neuropathy due to type 2 diabetes mellitus (HCC)     Continue Lyrica         Relevant Medications    insulin glargine (LANTUS) 100 UNIT/ML injection vial    metFORMIN (GLUCOPHAGE) 1000 MG tablet    empagliflozin (JARDIANCE) 25 MG tablet       Genitourinary    Prostate CA (HCC)     PSA and follow-up with Dr. Brittany Alexandre         Relevant Medications    aspirin 81 MG tablet    pregabalin (LYRICA) 75 MG capsule    Stage 3 chronic kidney disease     Get reports of kidney blood work from the [place] done in October            Other    Mixed hyperlipidemia     Continue rosuvastatin. Wait for the [place] to check his lipids at next visit         Relevant Medications    lisinopril (PRINIVIL;ZESTRIL) 10 MG tablet    aspirin 81 MG tablet    verapamil (CALAN SR) 180 MG extended release tablet    Rosuvastatin Calcium 20 MG CPSP           Return in about 3 months (around 3/16/2021), or AWV telephone after 12/25/2020.        Juana Haynes,   12/16/2020

## 2020-12-16 NOTE — TELEPHONE ENCOUNTER
Get blood work from [place] in [facility] Oct 29,2020  Get diabetic eye examination report from Dr. Suhail Montemayor

## 2020-12-16 NOTE — ASSESSMENT & PLAN NOTE
Poorly controlled. Hemoglobin A1c today was still high at 8.6%. [facility]   I wonder if he might benefit from Trulicity in addition to his Metformin Jardiance and Lantus

## 2020-12-29 ENCOUNTER — TELEMEDICINE (OUTPATIENT)
Dept: PRIMARY CARE CLINIC | Age: 80
End: 2020-12-29
Payer: MEDICARE

## 2020-12-29 PROCEDURE — 4040F PNEUMOC VAC/ADMIN/RCVD: CPT | Performed by: INTERNAL MEDICINE

## 2020-12-29 PROCEDURE — 1123F ACP DISCUSS/DSCN MKR DOCD: CPT | Performed by: INTERNAL MEDICINE

## 2020-12-29 PROCEDURE — G8482 FLU IMMUNIZE ORDER/ADMIN: HCPCS | Performed by: INTERNAL MEDICINE

## 2020-12-29 PROCEDURE — G0439 PPPS, SUBSEQ VISIT: HCPCS | Performed by: INTERNAL MEDICINE

## 2020-12-29 ASSESSMENT — PATIENT HEALTH QUESTIONNAIRE - PHQ9
SUM OF ALL RESPONSES TO PHQ QUESTIONS 1-9: 0
2. FEELING DOWN, DEPRESSED OR HOPELESS: 0
SUM OF ALL RESPONSES TO PHQ QUESTIONS 1-9: 0
SUM OF ALL RESPONSES TO PHQ9 QUESTIONS 1 & 2: 0
1. LITTLE INTEREST OR PLEASURE IN DOING THINGS: 0
SUM OF ALL RESPONSES TO PHQ QUESTIONS 1-9: 0

## 2020-12-29 ASSESSMENT — LIFESTYLE VARIABLES: HOW OFTEN DO YOU HAVE A DRINK CONTAINING ALCOHOL: 0

## 2020-12-29 NOTE — PATIENT INSTRUCTIONS
Personalized Preventive Plan for Ludy Rose - 12/29/2020  Medicare offers a range of preventive health benefits. Some of the tests and screenings are paid in full while other may be subject to a deductible, co-insurance, and/or copay. Some of these benefits include a comprehensive review of your medical history including lifestyle, illnesses that may run in your family, and various assessments and screenings as appropriate. After reviewing your medical record and screening and assessments performed today your provider may have ordered immunizations, labs, imaging, and/or referrals for you. A list of these orders (if applicable) as well as your Preventive Care list are included within your After Visit Summary for your review. Other Preventive Recommendations:    · A preventive eye exam performed by an eye specialist is recommended every 1-2 years to screen for glaucoma; cataracts, macular degeneration, and other eye disorders. · A preventive dental visit is recommended every 6 months. · Try to get at least 150 minutes of exercise per week or 10,000 steps per day on a pedometer . · Order or download the FREE \"Exercise & Physical Activity: Your Everyday Guide\" from [employer]. Call 5-756.937.4216 or search [employer] online. · You need 3549-1863 mg of calcium and 9079-8059 IU of vitamin D per day. It is possible to meet your calcium requirement with diet alone, but a vitamin D supplement is usually necessary to meet this goal.  · When exposed to the sun, use a sunscreen that protects against both UVA and UVB radiation with an SPF of 30 or greater. Reapply every 2 to 3 hours or after sweating, drying off with a towel, or swimming. · Always wear a seat belt when traveling in a car. Always wear a helmet when riding a bicycle or motorcycle.

## 2020-12-29 NOTE — PROGRESS NOTES
the past 7 days, have you experienced any of the following? New or Increased Pain, New or Increased Fatigue, Loneliness, Social Isolation, Stress or Anger?: None of These  Do you get the social and emotional support that you need?: Yes  Do you have a Living Will?: Yes  Advance Directives     Power of Elin Yu Will ACP-Advance Directive ACP-Power of     Not on File Not on File Not on File Not on File      General Health Risk Interventions:  · Patient [facility] and will bring in a copy to be scanned into his chart    Health Habits/Nutrition:  Health Habits/Nutrition  Do you exercise for at least 20 minutes 2-3 times per week?: (!) No  Have you lost any weight without trying in the past 3 months?: No  Do you eat fewer than 2 meals per day?: No  Have you seen a dentist within the past year?: Yes     Health Habits/Nutrition Interventions:  · Inadequate physical activity:  patient is not ready to increase his/her physical activity level at this time   · Patient has severe peripheral neuropathy and finds it difficult to walk or exercise    Hearing/Vision:  No exam data present  Hearing/Vision  Do you or your family notice any trouble with your hearing?: (!) Yes  Do you have difficulty driving, watching TV, or doing any of your daily activities because of your eyesight?: (!) Yes  Have you had an eye exam within the past year?: Yes  Hearing/Vision Interventions:  · Vision concerns:  Patient saw Dr. Lauren Turcios in early December 2020.   He also saw his hearing specialist at the [facility] in October 2020 and they are gone to get him new hearing aids      Personalized Preventive Plan   Current Health Maintenance Status  Immunization History   Administered Date(s) Administered    Influenza Vaccine, unspecified formulation 12/05/2017    Influenza Virus Vaccine 11/01/2018    Influenza, High Dose (Fluzone 65 yrs and older) 10/23/2018, 10/29/2020    Influenza, High-dose, Umesh Guerrero to COVID-19 and provide necessary medical care. The patient (and/or legal guardian) has also been advised to contact this office for worsening conditions or problems, and seek emergency medical treatment and/or call 911 if deemed necessary. Patient identification was verified at the start of the visit: Yes    Services were provided through phone to substitute for in-person clinic visit. Patient and provider were located at their individual homes. --Cayla Magaña DO on 12/29/2020 at 2:06 PM    An electronic signature was used to authenticate this note. Cardiovascular Disease Risk Counseling: Assessed the patient's risk to develop cardiovascular disease and reviewed main risk factors. Reviewed steps to reduce disease risk including:   · Quitting tobacco use, reducing amount smoked, or not starting the habit  · Making healthy food choices  · Being physically active and gradualy increasing activity levels   · Reduce weight and determine a healthy BMI goal  · Monitor blood pressure and treat if higher than 140/90 mmHg  · Maintain blood total cholesterol levels under 5 mmol/l or 190 mg/dl  · Maintain LDL cholesterol levels under 3.0 mmol/l or 115 mg/dl   · Control blood glucose levels  · Consider taking aspirin (75 mg daily), once blood pressure is controlled   Provided a follow up plan.   Time spent (minutes): 5

## 2021-02-17 LAB — DIABETIC RETINOPATHY: POSITIVE

## 2021-03-16 ENCOUNTER — OFFICE VISIT (OUTPATIENT)
Dept: PRIMARY CARE CLINIC | Age: 81
End: 2021-03-16
Payer: MEDICARE

## 2021-03-16 ENCOUNTER — TELEPHONE (OUTPATIENT)
Dept: PRIMARY CARE CLINIC | Age: 81
End: 2021-03-16

## 2021-03-16 VITALS
HEIGHT: 68 IN | BODY MASS INDEX: 30.77 KG/M2 | WEIGHT: 203 LBS | HEART RATE: 79 BPM | DIASTOLIC BLOOD PRESSURE: 74 MMHG | SYSTOLIC BLOOD PRESSURE: 136 MMHG | TEMPERATURE: 98.3 F | OXYGEN SATURATION: 95 %

## 2021-03-16 DIAGNOSIS — Z86.79 HISTORY OF PAROXYSMAL SUPRAVENTRICULAR TACHYCARDIA: ICD-10-CM

## 2021-03-16 DIAGNOSIS — I10 ESSENTIAL HYPERTENSION: ICD-10-CM

## 2021-03-16 DIAGNOSIS — G62.9 NEUROPATHY: ICD-10-CM

## 2021-03-16 DIAGNOSIS — E11.40 NEUROPATHY DUE TO TYPE 2 DIABETES MELLITUS (HCC): ICD-10-CM

## 2021-03-16 DIAGNOSIS — N18.31 STAGE 3A CHRONIC KIDNEY DISEASE (HCC): ICD-10-CM

## 2021-03-16 DIAGNOSIS — E78.2 MIXED HYPERLIPIDEMIA: ICD-10-CM

## 2021-03-16 DIAGNOSIS — E11.40 TYPE 2 DIABETES MELLITUS WITH DIABETIC NEUROPATHY, WITH LONG-TERM CURRENT USE OF INSULIN (HCC): Primary | ICD-10-CM

## 2021-03-16 DIAGNOSIS — Z79.4 TYPE 2 DIABETES MELLITUS WITH DIABETIC NEUROPATHY, WITH LONG-TERM CURRENT USE OF INSULIN (HCC): Primary | ICD-10-CM

## 2021-03-16 DIAGNOSIS — C61 PROSTATE CA (HCC): ICD-10-CM

## 2021-03-16 PROCEDURE — 99213 OFFICE O/P EST LOW 20 MIN: CPT | Performed by: INTERNAL MEDICINE

## 2021-03-16 PROCEDURE — 4040F PNEUMOC VAC/ADMIN/RCVD: CPT | Performed by: INTERNAL MEDICINE

## 2021-03-16 PROCEDURE — 1123F ACP DISCUSS/DSCN MKR DOCD: CPT | Performed by: INTERNAL MEDICINE

## 2021-03-16 PROCEDURE — G8482 FLU IMMUNIZE ORDER/ADMIN: HCPCS | Performed by: INTERNAL MEDICINE

## 2021-03-16 PROCEDURE — G8427 DOCREV CUR MEDS BY ELIG CLIN: HCPCS | Performed by: INTERNAL MEDICINE

## 2021-03-16 PROCEDURE — 1036F TOBACCO NON-USER: CPT | Performed by: INTERNAL MEDICINE

## 2021-03-16 PROCEDURE — G8417 CALC BMI ABV UP PARAM F/U: HCPCS | Performed by: INTERNAL MEDICINE

## 2021-03-16 RX ORDER — BRIMONIDINE TARTRATE 2 MG/ML
SOLUTION/ DROPS OPHTHALMIC
COMMUNITY
Start: 2021-03-03

## 2021-03-16 RX ORDER — SEMAGLUTIDE 1.34 MG/ML
INJECTION, SOLUTION SUBCUTANEOUS
COMMUNITY
End: 2021-05-25 | Stop reason: ALTCHOICE

## 2021-03-16 ASSESSMENT — ENCOUNTER SYMPTOMS
STRIDOR: 0
EYE DISCHARGE: 0
BACK PAIN: 0
ABDOMINAL PAIN: 0
SORE THROAT: 0
EYE ITCHING: 0
RHINORRHEA: 0
WHEEZING: 0
SHORTNESS OF BREATH: 0
ANAL BLEEDING: 0
COLOR CHANGE: 0
FACIAL SWELLING: 0
TROUBLE SWALLOWING: 0
COUGH: 0
BLOOD IN STOOL: 0
VOMITING: 0
DIARRHEA: 0
CONSTIPATION: 0
NAUSEA: 0
EYE PAIN: 0
PHOTOPHOBIA: 0

## 2021-03-16 ASSESSMENT — PATIENT HEALTH QUESTIONNAIRE - PHQ9
SUM OF ALL RESPONSES TO PHQ QUESTIONS 1-9: 0
SUM OF ALL RESPONSES TO PHQ QUESTIONS 1-9: 0

## 2021-03-16 NOTE — ASSESSMENT & PLAN NOTE
Stay on his Ozempic 2 mg weekly, Metformin 1000 mg with breakfast, Jardiance 25 mg a day. And Lantus I believe he is on 34 units a day. Monitor fasting blood sugars as before.   He will contact the pharmacist at the [facility] who adjusted his medications

## 2021-03-16 NOTE — PROGRESS NOTES
3/16/2021    Name: Linda Tariq : 1940 Sex: male  Age: 68 y.o. Subjective:  Chief Complaint   Patient presents with    Hypertension        Hypertension  Pertinent negatives include no chest pain, headaches, palpitations or shortness of breath. He saw the [place] doctor and blood work was done. Reviewed reports and his hemoglobin A1c was still high at 8.9%. This was done on 2021. He had been started on Ozempic in 2021. He says his blood sugars are coming down and they will be referred repeating his A1c on his next office visit he continues on Metformin 1000 mg once a day and Jardiance 25 mg a day. And Lantus as per [place] doctor    Reviewed blood work. That was done on 2021  His fasting blood sugar was 148, creatinine 1.5, BUN 32, estimated GFR had improved to 45. Total bilirubin is 1.7. Total cholesterol was 131 LDL cholesterol 62, HDL cholesterol 48. Triglycerides 198. Vitamin B12 was normal.  Calcium was a little high at 10.6    He was at the [place] in 2020 for his ears and they gave him a flu shot and did some blood work for his kidneys. He is going back to see his ear doctor at the [place] in the near future. .    He had his Covid vaccinations series done at the [place]. We will get a copy of exactly when he had them done. He saw Dr Flip Quijano for follow-up on his prostate cancer. Reviewed report. [facility] He saw Dr. London Donnelly last week for his diabetic eye examination. Reviewed report. This was in 2021. He also saw his  podiatrist for diabetic examination on 2021    He saw Dr. Saroj Yanestle his cardiologist who wanted to adjust some of his medications because of the above numbers. He wants his LDL cholesterol to be below 55 as Lexy Shafer is a diabetic. He is wants to switch him from atorvastatin 40 mg to rosuvastatin 20 mg. He was given a prescription for this.   He was also told to increase his Jardiance from 12.5 mg to 25 mg a day and because of his decreasing kidney functions cut back on his metformin to 1000 mg once a day instead of twice a day. Jean-Paul Canales knows he needs to watch his diet more closely than he has been. His biggest complaint is his unsteady gait. He has not fallen but has come close to falling several times. He has diabetic neuropathy and is on Lyrica    He is a patient at the [place] and they manage his insulin  His blood sugars have been dropping so they discontinued his NovoLog mealtime insulin but kept him on Lantus 33 units at bedtime. He had no low blood sugar spells. .      In October 2019 on [place] recommendation he discontinued his CPAP. As patient refused to use it every nighty. Diabetic foot examination done by Dr. Arcenio Loaiza in February 2020. 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 No residual neurological deficit  . He has a history of hypertension, type 2 diabetes mellitus on insulin. Diabetic neuropathy. Diabetic gastroparesis. Hyperlipidemia, obstructive sleep apnea not on CPAP. Nile Keene He has a history of supraventricular tachycardia under the care of Dr. Ok Colin. He last saw Dr. Ok Colin on September 11, 2019 at which time an EKG revealed sinus rhythm with nonspecific intraventricular conduction delay ,          Review of Systems   Constitutional: Negative for appetite change, fatigue and unexpected weight change. HENT: Negative for congestion, ear pain, facial swelling, rhinorrhea, sore throat, tinnitus and trouble swallowing. Eyes: Negative for photophobia, pain, discharge, itching and visual disturbance. Respiratory: Negative for cough, shortness of breath, wheezing and stridor. Cardiovascular: Negative for chest pain, palpitations and leg swelling. Gastrointestinal: Negative for abdominal pain, anal bleeding, blood in stool, constipation, diarrhea, nausea and vomiting. Endocrine: Negative for cold intolerance, heat intolerance, polydipsia, polyphagia and polyuria. Genitourinary: Negative for difficulty urinating, dysuria, flank pain, frequency, hematuria and urgency. Musculoskeletal: Positive for gait problem. Negative for arthralgias, back pain, joint swelling and myalgias. Skin: Negative for color change, pallor and rash. Allergic/Immunologic: Negative for environmental allergies and food allergies. Neurological: Positive for weakness and numbness. Negative for dizziness, tremors, seizures, syncope, speech difficulty, light-headedness and headaches. Vertigo   Hematological: Negative for adenopathy. Does not bruise/bleed easily. Psychiatric/Behavioral: Negative for agitation, behavioral problems, confusion, sleep disturbance and suicidal ideas. The patient is not nervous/anxious. Current Outpatient Medications:     Semaglutide,0.25 or 0.5MG/DOS, (OZEMPIC, 0.25 OR 0.5 MG/DOSE,) 2 MG/1.5ML SOPN, Inject into the skin, Disp: , Rfl:     cycloSPORINE (RESTASIS) 0.05 % ophthalmic emulsion, cycloSPORINE Restasis 0.05 drops 2 times per day 60 [facility] ([id number]), Disp: , Rfl:     Magnesium Oxide 420 MG TABS, TAKE ONE TABLET BY MOUTH TWICE A DAY, Disp: , Rfl:     Omega-3 Fatty Acids (FISH OIL) 1200 MG CAPS, Take 1,200 mg by mouth, Disp: , Rfl:     metFORMIN (GLUCOPHAGE) 1000 MG tablet, Take 1,000 mg by mouth daily (with breakfast), Disp: , Rfl:     empagliflozin (JARDIANCE) 25 MG tablet, Take 25 mg by mouth daily, Disp: , Rfl:     Rosuvastatin Calcium 20 MG CPSP, Take by mouth daily, Disp: , Rfl:     Misc. Devices (ALL-BODY MASSAGE) MISC, Massage as needed for generalized body pain, Disp: 1 each, Rfl: 5    timolol (TIMOPTIC) 0.5 % ophthalmic solution, , Disp: , Rfl:     HM OMEGA-3-6-9 FATTY ACIDS CAPS, Take 1,200 mg by mouth daily, Disp: , Rfl:     pregabalin (LYRICA) 75 MG capsule, Take 75 mg by mouth 2 times daily. , Disp: , Rfl:     lisinopril (PRINIVIL;ZESTRIL) 10 MG tablet, Take 10 mg by mouth daily, Disp: , Rfl: Family History   Problem Relation Age of Onset    Dementia Mother         Social History     Tobacco Use    Smoking status: Never Smoker    Smokeless tobacco: Never Used   Substance Use Topics    Alcohol use: Not Currently     Frequency: Never    Drug use: Never        Objective  Vitals:    03/16/21 0829   BP: 136/74   Site: Right Upper Arm   Position: Sitting   Cuff Size: Medium Adult   Pulse: 79   Temp: 98.3 °F (36.8 °C)   TempSrc: Temporal   SpO2: 95%   Weight: 203 lb (92.1 kg)   Height: 5' 8\" (1.727 m)        Exam:  Physical Exam  Vitals signs reviewed. Constitutional:       General: He is not in acute distress. Appearance: Normal appearance. He is well-developed. He is obese. He is not ill-appearing. HENT:      Head: Normocephalic. Right Ear: External ear normal.      Left Ear: External ear normal.   Eyes:      General: No scleral icterus. Right eye: No discharge. Left eye: No discharge. Conjunctiva/sclera: Conjunctivae normal.      Pupils: Pupils are equal, round, and reactive to light. Neck:      Musculoskeletal: Normal range of motion and neck supple. Thyroid: No thyromegaly. Cardiovascular:      Rate and Rhythm: Normal rate and regular rhythm. Heart sounds: Normal heart sounds. No murmur. No friction rub. No gallop. Pulmonary:      Effort: Pulmonary effort is normal. No respiratory distress. Breath sounds: Normal breath sounds. No wheezing or rales. Chest:      Chest wall: No tenderness. Abdominal:      General: Bowel sounds are normal. There is no distension. Palpations: Abdomen is soft. There is no mass. Tenderness: There is no abdominal tenderness. There is no guarding or rebound. Comments: Protruberant   Musculoskeletal:         General: No tenderness or deformity. Comments:   Ambulates with  a cane   Lymphadenopathy:      Cervical: No cervical adenopathy. Skin:     General: Skin is warm and dry.       Coloration: Skin is not pale. Findings: No erythema or rash. Neurological:      Mental Status: He is alert and oriented to person, place, and time. Cranial Nerves: No cranial nerve deficit. Sensory: Sensory deficit present. Deep Tendon Reflexes: Reflexes normal.      Comments: Decreased sensation to touch below the knee   Psychiatric:         Behavior: Behavior normal.         Thought Content: Thought content normal.         Judgment: Judgment normal.          Last labs reviewed. ASSESSMENT & PLAN :   Problem List        Circulatory    Essential hypertension     Blood pressures are stable. Continue medications and monitor blood pressures at home. Call office if systolics are over 351 over diastolics over 90. Endocrine    Type 2 diabetes mellitus ([facility] [address] - Primary     Stay on his Ozempic 2 mg weekly, Metformin 1000 mg with breakfast, Jardiance 25 mg a day. And Lantus I believe he is on 34 units a day. Monitor fasting blood sugars as before. He will contact the pharmacist at the [place] who adjusted his medications         Relevant Medications    insulin glargine (LANTUS) 100 UNIT/ML injection vial    metFORMIN (GLUCOPHAGE) 1000 MG tablet    empagliflozin (JARDIANCE) 25 MG tablet    Semaglutide,0.25 or 0.5MG/DOS, (OZEMPIC, 0.25 OR 0.5 MG/DOSE,) 2 MG/1.5ML SOPN    Neuropathy due to type 2 diabetes mellitus (HCC)     Continues Lyrica from [place]         Relevant Medications    insulin glargine (LANTUS) 100 UNIT/ML injection vial    metFORMIN (GLUCOPHAGE) 1000 MG tablet    empagliflozin (JARDIANCE) 25 MG tablet    Semaglutide,0.25 or 0.5MG/DOS, (OZEMPIC, 0.25 OR 0.5 MG/DOSE,) 2 MG/1.5ML SOPN       Nervous and Auditory    Neuropathy     Continue medications            Genitourinary    Prostate CA (HCC)    Relevant Medications    aspirin 81 MG tablet    pregabalin (LYRICA) 75 MG capsule    Stage 3 chronic kidney disease     This has shown some improvement from earlier this year.   He will continue medications, avoid NSAID use. Will monitor at the [place]            Other    Mixed hyperlipidemia     Increased dose of rosuvastatin to 20 mg has helped lower his cholesterol. He will stay on the same dose         Relevant Medications    lisinopril (PRINIVIL;ZESTRIL) 10 MG tablet    aspirin 81 MG tablet    verapamil (CALAN SR) 180 MG extended release tablet    Rosuvastatin Calcium 20 MG CPSP    History of paroxysmal supraventricular tachycardia     No further episodes. Well-controlled on verapamil follow-up with cardiologist                Return in about 3 months (around 6/16/2021), or Diabetes mellitus and CKD.        Lex Salgado DO  3/16/2021

## 2021-03-16 NOTE — ASSESSMENT & PLAN NOTE
Increased dose of rosuvastatin to 20 mg has helped lower his cholesterol.   He will stay on the same dose

## 2021-03-16 NOTE — ASSESSMENT & PLAN NOTE
Blood pressures are stable. Continue medications and monitor blood pressures at home. Call office if systolics are over 730 over diastolics over 90.

## 2021-03-16 NOTE — ASSESSMENT & PLAN NOTE
This has shown some improvement from earlier this year. He will continue medications, avoid NSAID use.   Will monitor at the [place]

## 2021-04-07 LAB — FECAL BLOOD IMMUNOCHEMICAL TEST: NORMAL

## 2021-05-25 ENCOUNTER — OFFICE VISIT (OUTPATIENT)
Dept: PRIMARY CARE CLINIC | Age: 81
End: 2021-05-25
Payer: MEDICARE

## 2021-05-25 VITALS
WEIGHT: 195 LBS | OXYGEN SATURATION: 95 % | SYSTOLIC BLOOD PRESSURE: 128 MMHG | DIASTOLIC BLOOD PRESSURE: 64 MMHG | TEMPERATURE: 98.2 F | HEART RATE: 84 BPM | BODY MASS INDEX: 29.55 KG/M2 | HEIGHT: 68 IN

## 2021-05-25 DIAGNOSIS — N18.31 STAGE 3A CHRONIC KIDNEY DISEASE (HCC): ICD-10-CM

## 2021-05-25 DIAGNOSIS — C61 PROSTATE CA (HCC): ICD-10-CM

## 2021-05-25 DIAGNOSIS — Z86.79 HISTORY OF PAROXYSMAL SUPRAVENTRICULAR TACHYCARDIA: ICD-10-CM

## 2021-05-25 DIAGNOSIS — R91.8 PULMONARY NODULES: ICD-10-CM

## 2021-05-25 DIAGNOSIS — Z87.09 H/O ASBESTOSIS: ICD-10-CM

## 2021-05-25 DIAGNOSIS — N12 PYELONEPHRITIS: Primary | ICD-10-CM

## 2021-05-25 DIAGNOSIS — R93.7 ABNORMAL X-RAY OF PELVIS: ICD-10-CM

## 2021-05-25 DIAGNOSIS — E11.40 TYPE 2 DIABETES MELLITUS WITH DIABETIC NEUROPATHY, WITH LONG-TERM CURRENT USE OF INSULIN (HCC): ICD-10-CM

## 2021-05-25 DIAGNOSIS — G62.9 NEUROPATHY: ICD-10-CM

## 2021-05-25 DIAGNOSIS — E78.2 MIXED HYPERLIPIDEMIA: ICD-10-CM

## 2021-05-25 DIAGNOSIS — I63.50 CEREBROVASCULAR ACCIDENT (CVA) DUE TO OCCLUSION OF CEREBRAL ARTERY (HCC): ICD-10-CM

## 2021-05-25 DIAGNOSIS — I10 ESSENTIAL HYPERTENSION: ICD-10-CM

## 2021-05-25 DIAGNOSIS — Z79.4 TYPE 2 DIABETES MELLITUS WITH DIABETIC NEUROPATHY, WITH LONG-TERM CURRENT USE OF INSULIN (HCC): ICD-10-CM

## 2021-05-25 PROCEDURE — 99215 OFFICE O/P EST HI 40 MIN: CPT | Performed by: INTERNAL MEDICINE

## 2021-05-25 PROCEDURE — 1111F DSCHRG MED/CURRENT MED MERGE: CPT | Performed by: INTERNAL MEDICINE

## 2021-05-25 RX ORDER — CEFDINIR 300 MG/1
CAPSULE ORAL
COMMUNITY
Start: 2021-05-20 | End: 2022-01-19 | Stop reason: ALTCHOICE

## 2021-05-25 RX ORDER — CHLORAL HYDRATE 500 MG
1200 CAPSULE ORAL
COMMUNITY

## 2021-05-25 SDOH — ECONOMIC STABILITY: FOOD INSECURITY: WITHIN THE PAST 12 MONTHS, YOU WORRIED THAT YOUR FOOD WOULD RUN OUT BEFORE YOU GOT MONEY TO BUY MORE.: NEVER TRUE

## 2021-05-25 ASSESSMENT — SOCIAL DETERMINANTS OF HEALTH (SDOH): HOW HARD IS IT FOR YOU TO PAY FOR THE VERY BASICS LIKE FOOD, HOUSING, MEDICAL CARE, AND HEATING?: NOT HARD AT ALL

## 2021-05-25 NOTE — PROGRESS NOTES
2021    Name: Curtis Ruelas : 1940 Sex: male  Age: 68 y.o. Subjective:  Chief Complaint   Patient presents with    Other     hosp f/u        HPI     Transition of care visit for this patient was hospitalized from 2021 until 2021. At [facility]. Symptoms started 30-day before with urinary frequency, generalized malaise, flank and suprapubic pain, reviewed ED physician's reports, reviewed all reports from the hospital including consults, H&P and discharge summary. Reviewed labs. His blood pressure was down to 88/51 at home so his lisinopril was held    CT scan showed perinephric stranding. White count was elevated. Urinalysis was positive for bacteria. He was admitted and treated for UTI. Initially given IV Rocephin and then he was changed to cefdinir on discharge. White count came down. He had MAMI on admission which resolved with fluids and treatment. He was diagnosed with tubulointerstitial nephritis. CT scan showed a pulmonary nodule which will need to be followed as an outpatient. He also had a bone scan which showed abnormal findings in the pelvis and left femur. As patient has a history of prostate CA this will need to be followed up as an outpatient. Medication reconciliation done. Lisinopril was held and cefdinir was added. Rest of his medications remain the same. His Lantus insulin however had been increased prior to admission by his [place] doctor to 50 units subcu daily    Patient has a history of type 2 diabetes mellitus on insulin, diabetic neuropathy, CVA, hypertension, paroxysmal SVT, hyperlipidemia, prostate CA, pulmonary nodules and a history of asbestosis. Review of Systems   Constitutional: Positive for fatigue. Negative for appetite change and unexpected weight change. HENT: Negative for congestion, ear pain, facial swelling, rhinorrhea, sore throat, tinnitus and trouble swallowing.     Eyes: Negative for photophobia, pain, mouth, Disp: , Rfl:     metFORMIN (GLUCOPHAGE) 1000 MG tablet, Take 1,000 mg by mouth daily (with breakfast), Disp: , Rfl:     Rosuvastatin Calcium 20 MG CPSP, Take by mouth daily, Disp: , Rfl:     Misc. Devices (ALL-BODY MASSAGE) MISC, Massage as needed for generalized body pain, Disp: 1 each, Rfl: 5    timolol (TIMOPTIC) 0.5 % ophthalmic solution, , Disp: , Rfl:     HM OMEGA-3-6-9 FATTY ACIDS CAPS, Take 1,200 mg by mouth daily, Disp: , Rfl:     pregabalin (LYRICA) 75 MG capsule, Take 75 mg by mouth 2 times daily. , Disp: , Rfl:     lisinopril (PRINIVIL;ZESTRIL) 10 MG tablet, Take 10 mg by mouth daily, Disp: , Rfl:     clopidogrel (PLAVIX) 75 MG tablet, Take 75 mg by mouth daily, Disp: , Rfl:     vitamin D (CHOLECALCIFEROL) 1000 UNIT TABS tablet, Take 1,000 Units by mouth daily, Disp: , Rfl:     Multiple Vitamins-Minerals (MULTI FOR HIM PO), Take by mouth daily, Disp: , Rfl:     aspirin 81 MG tablet, Take 81 mg by mouth 2 times daily , Disp: , Rfl:     verapamil (CALAN SR) 180 MG extended release tablet, Take 180 mg by mouth nightly, Disp: , Rfl:     insulin glargine (LANTUS) 100 UNIT/ML injection vial, Inject 34 Units into the skin As directed , Disp: , Rfl:     cefdinir (OMNICEF) 300 MG capsule, TAKE ONE CAPSULE BY MOUTH EVERY 12 HOURS, Disp: , Rfl:     Cyanocobalamin (VITAMIN B 12) 100 MCG LOZG, Take 500 mcg by mouth, Disp: , Rfl:     Omega-3 Fatty Acids (FISH OIL) 1000 MG CAPS, Take 1,200 mg by mouth, Disp: , Rfl:      No Known Allergies     Past Medical History:   Diagnosis Date    [facility]A [facility]cere[facility] vascular accident) ([facility] [address]     Gastroparesis     H/O asbestosis     History of paroxysmal supraventricular tachycardia 6/11/2019    Neuropathy     Prostate CA (HCC)     Sleep apnea with use of continuous positive airway pressure (CPAP)     TIA (transient ischemic attack)        Health Maintenance Due   Topic Date Due    Hepatitis C screen  Never done    DTaP/Tdap/Td vaccine (1 - Tdap) 11/27/1961  PSA counseling  Never done        Patient Active Problem List   Diagnosis    Essential hypertension    Type 2 diabetes mellitus ([facility] [address]    CVA (cerebral vascular accident) ([facility] [address]    Neuropathy due to type 2 diabetes mellitus ([facility] [address]    Sleep apnea with use of continuous positive airway pressure (CPAP)    Mixed hyperlipidemia    H/O asbestosis    Prostate CA ([facility] [address]    Lumbar and sacral osteoarthritis    History of paroxysmal supraventricular tachycardia    Neuropathy    Need for diphtheria-tetanus-pertussis (Tdap) vaccine    Vertigo    Stage 3 chronic kidney disease    Pyelonephritis    Pulmonary nodules    Abnormal x-ray of pelvis        Past Surgical History:   Procedure Laterality Date    CERVICAL DISC SURGERY      CHOLECYSTECTOMY      KNEE ARTHROPLASTY      PROSTATE BIOPSY          Family History   Problem Relation Age of Onset    Dementia Mother         Social History     Tobacco Use    Smoking status: Never Smoker    Smokeless tobacco: Never Used   Substance Use Topics    Alcohol use: Not Currently    Drug use: Never        Objective  Vitals:    05/25/21 1418   BP: 128/64   Site: Right Upper Arm   Position: Sitting   Cuff Size: Medium Adult   Pulse: 84   Temp: 98.2 °F (36.8 °C)   TempSrc: Temporal   SpO2: 95%   Weight: 195 lb (88.5 kg)   Height: 5' 8\" (1.727 m)        Exam:  Physical Exam  Vitals reviewed. Exam conducted with a chaperone present. Constitutional:       General: He is not in acute distress. Appearance: Normal appearance. He is well-developed. He is not ill-appearing. HENT:      Head: Normocephalic. Right Ear: External ear normal.      Left Ear: External ear normal.   Eyes:      General: No scleral icterus. Right eye: No discharge. Left eye: No discharge. Conjunctiva/sclera: Conjunctivae normal.      Pupils: Pupils are equal, round, and reactive to light. Neck:      Thyroid: No thyromegaly.    Cardiovascular:      Rate and Rhythm: Normal rate and regular rhythm. Heart sounds: Normal heart sounds. No murmur heard. No friction rub. No gallop. Pulmonary:      Effort: Pulmonary effort is normal. No respiratory distress. Breath sounds: Normal breath sounds. No wheezing or rales. Chest:      Chest wall: No tenderness. Abdominal:      General: Bowel sounds are normal. There is no distension. Palpations: Abdomen is soft. There is no mass. Tenderness: There is no abdominal tenderness. There is no guarding or rebound. Musculoskeletal:         General: No tenderness or deformity. Normal range of motion. Cervical back: Normal range of motion and neck supple. Lymphadenopathy:      Cervical: No cervical adenopathy. Skin:     General: Skin is warm and dry. Coloration: Skin is not pale. Findings: No erythema or rash. Neurological:      Mental Status: He is alert and oriented to person, place, and time. Cranial Nerves: No cranial nerve deficit. Sensory: Sensory deficit present. Deep Tendon Reflexes: Reflexes normal.      Comments: Decreased sensation to light touch below the knee bilaterally   Psychiatric:         Mood and Affect: Mood normal.         Behavior: Behavior normal.         Thought Content: Thought content normal.         Judgment: Judgment normal.          Last labs reviewed. ASSESSMENT & PLAN :   Problem List        Circulatory    Essential hypertension       monitor blood pressures every morning. If systolic blood pressure is 140 or above take lisinopril half of a 10 mg. Relevant Orders    Comprehensive Metabolic Panel    CVA (cerebral vascular accident) ([address]       no sign of TIA or symptoms suggestive of CVA. Continue his Plavix and aspirin            Endocrine    Type 2 diabetes mellitus ([address]       check fasting blood sugars and if over 120 let me know and we will adjust medications. Continue his Lantus 50 units subcu. Watch diet.          Relevant Medications insulin glargine (LANTUS) 100 UNIT/ML injection vial    metFORMIN (GLUCOPHAGE) 1000 MG tablet    empagliflozin (JARDIANCE) 25 MG tablet    Semaglutide (OZEMPIC, 1 MG/DOSE, SC)       Nervous and Auditory    Neuropathy       stable, use Lyrica and follow-up with his neurologist            Genitourinary    Prostate CA [facility])       follow-up with prostate doctor, check bone scan to rule out metastatic disease         Relevant Medications    aspirin 81 MG tablet    pregabalin (LYRICA) 75 MG capsule    Other Relevant Orders    NM BONE SCAN WHOLE BODY    Stage 3 chronic kidney disease       avoid NSAID use and will monitor CMP         Pyelonephritis - Primary       finish up cefdinir, about a couple weeks after that check UA and U C&S. Zoya Brittle Check CBC         Relevant Orders    MO DISCHARGE MEDS RECONCILED W/ CURRENT OUTPATIENT MED LIST (Completed)    CBC Auto Differential    Comprehensive Metabolic Panel    Urinalysis       Other    Mixed hyperlipidemia     Watch saturated fats in diet and will monitor lipids          Relevant Medications    lisinopril (PRINIVIL;ZESTRIL) 10 MG tablet    aspirin 81 MG tablet    verapamil (CALAN SR) 180 MG extended release tablet    Rosuvastatin Calcium 20 MG CPSP    H/O asbestosis       CT of the lungs noncontrast.  If plaques appear to be enlarging  Or patient is symptomatic from a respiratory standpoint sent to pulmonologist         History of paroxysmal supraventricular tachycardia       no recent episodes of SVT,         Pulmonary nodules       obtain a noncontrast CT scan of his chest to monitor nodule size and asbestos plaques         Relevant Orders    CT CHEST WO CONTRAST    Abnormal x-ray of pelvis       await results of bone scan. If metastatic lesions are seen, send to oncologist         Relevant Orders    NM BONE SCAN WHOLE BODY           Return in 1 month (on 6/25/2021).        Kevin Coughlin,   5/26/2021

## 2021-05-26 ASSESSMENT — ENCOUNTER SYMPTOMS
SORE THROAT: 0
EYE PAIN: 0
STRIDOR: 0
COUGH: 0
VOMITING: 0
DIARRHEA: 0
TROUBLE SWALLOWING: 0
WHEEZING: 0
ANAL BLEEDING: 0
EYE ITCHING: 0
SHORTNESS OF BREATH: 0
RHINORRHEA: 0
NAUSEA: 0
EYE DISCHARGE: 0
ABDOMINAL PAIN: 0
FACIAL SWELLING: 0
CONSTIPATION: 0
PHOTOPHOBIA: 0
BLOOD IN STOOL: 0
COLOR CHANGE: 0

## 2021-05-26 NOTE — ASSESSMENT & PLAN NOTE
monitor blood pressures every morning. If systolic blood pressure is 140 or above take lisinopril half of a 10 mg.

## 2021-05-26 NOTE — ASSESSMENT & PLAN NOTE
check fasting blood sugars and if over 120 let me know and we will adjust medications. Continue his Lantus 50 units subcu. Watch diet.

## 2021-05-26 NOTE — ASSESSMENT & PLAN NOTE
CT of the lungs noncontrast.  If plaques appear to be enlarging  Or patient is symptomatic from a respiratory standpoint sent to pulmonologist

## 2021-05-28 ENCOUNTER — TELEPHONE (OUTPATIENT)
Dept: PRIMARY CARE CLINIC | Age: 81
End: 2021-05-28

## 2021-05-28 DIAGNOSIS — I63.50 CEREBROVASCULAR ACCIDENT (CVA) DUE TO OCCLUSION OF CEREBRAL ARTERY (HCC): Primary | ICD-10-CM

## 2021-05-28 NOTE — TELEPHONE ENCOUNTER
Patient is requesting an order for a walker be sent to [facility]       An order for a walker is pending ready for signature.

## 2021-06-08 DIAGNOSIS — N12 PYELONEPHRITIS: ICD-10-CM

## 2021-06-08 DIAGNOSIS — R26.81 UNSTEADY GAIT WHEN WALKING: ICD-10-CM

## 2021-06-08 DIAGNOSIS — I10 ESSENTIAL HYPERTENSION: ICD-10-CM

## 2021-06-08 DIAGNOSIS — G62.9 NEUROPATHY: Primary | ICD-10-CM

## 2021-06-08 LAB
ALBUMIN SERPL-MCNC: 4.1 G/DL (ref 3.5–5.2)
ALP BLD-CCNC: 87 U/L (ref 40–129)
ALT SERPL-CCNC: 17 U/L (ref 0–40)
ANION GAP SERPL CALCULATED.3IONS-SCNC: 17 MMOL/L (ref 7–16)
AST SERPL-CCNC: 27 U/L (ref 0–39)
BACTERIA: NORMAL /HPF
BASOPHILS ABSOLUTE: 0.04 E9/L (ref 0–0.2)
BASOPHILS RELATIVE PERCENT: 0.6 % (ref 0–2)
BILIRUB SERPL-MCNC: 1.7 MG/DL (ref 0–1.2)
BILIRUBIN URINE: NEGATIVE
BLOOD, URINE: NEGATIVE
BUN BLDV-MCNC: 21 MG/DL (ref 6–23)
CALCIUM SERPL-MCNC: 9.8 MG/DL (ref 8.6–10.2)
CHLORIDE BLD-SCNC: 98 MMOL/L (ref 98–107)
CLARITY: CLEAR
CO2: 24 MMOL/L (ref 22–29)
COLOR: YELLOW
CREAT SERPL-MCNC: 1.2 MG/DL (ref 0.7–1.2)
EOSINOPHILS ABSOLUTE: 0.13 E9/L (ref 0.05–0.5)
EOSINOPHILS RELATIVE PERCENT: 2.1 % (ref 0–6)
GFR AFRICAN AMERICAN: >60
GFR NON-AFRICAN AMERICAN: 58 ML/MIN/1.73
GLUCOSE BLD-MCNC: 135 MG/DL (ref 74–99)
GLUCOSE URINE: >=1000 MG/DL
HCT VFR BLD CALC: 50.9 % (ref 37–54)
HEMOGLOBIN: 15.8 G/DL (ref 12.5–16.5)
IMMATURE GRANULOCYTES #: 0.01 E9/L
IMMATURE GRANULOCYTES %: 0.2 % (ref 0–5)
KETONES, URINE: NEGATIVE MG/DL
LEUKOCYTE ESTERASE, URINE: NEGATIVE
LYMPHOCYTES ABSOLUTE: 1.27 E9/L (ref 1.5–4)
LYMPHOCYTES RELATIVE PERCENT: 20.5 % (ref 20–42)
MCH RBC QN AUTO: 30.9 PG (ref 26–35)
MCHC RBC AUTO-ENTMCNC: 31 % (ref 32–34.5)
MCV RBC AUTO: 99.4 FL (ref 80–99.9)
MONOCYTES ABSOLUTE: 0.56 E9/L (ref 0.1–0.95)
MONOCYTES RELATIVE PERCENT: 9 % (ref 2–12)
NEUTROPHILS ABSOLUTE: 4.2 E9/L (ref 1.8–7.3)
NEUTROPHILS RELATIVE PERCENT: 67.6 % (ref 43–80)
NITRITE, URINE: NEGATIVE
PDW BLD-RTO: 15.3 FL (ref 11.5–15)
PH UA: 6 (ref 5–9)
PLATELET # BLD: 360 E9/L (ref 130–450)
PMV BLD AUTO: 10.2 FL (ref 7–12)
POTASSIUM SERPL-SCNC: 4.9 MMOL/L (ref 3.5–5)
PROTEIN UA: ABNORMAL MG/DL
RBC # BLD: 5.12 E12/L (ref 3.8–5.8)
RBC UA: NORMAL /HPF (ref 0–2)
SODIUM BLD-SCNC: 139 MMOL/L (ref 132–146)
SPECIFIC GRAVITY UA: 1.01 (ref 1–1.03)
TOTAL PROTEIN: 7.9 G/DL (ref 6.4–8.3)
UROBILINOGEN, URINE: 0.2 E.U./DL
WBC # BLD: 6.2 E9/L (ref 4.5–11.5)
WBC UA: NORMAL /HPF (ref 0–5)

## 2021-06-24 ENCOUNTER — TELEPHONE (OUTPATIENT)
Dept: PRIMARY CARE CLINIC | Age: 81
End: 2021-06-24

## 2021-07-20 ENCOUNTER — OFFICE VISIT (OUTPATIENT)
Dept: PRIMARY CARE CLINIC | Age: 81
End: 2021-07-20
Payer: MEDICARE

## 2021-07-20 VITALS
HEIGHT: 68 IN | DIASTOLIC BLOOD PRESSURE: 64 MMHG | WEIGHT: 197 LBS | SYSTOLIC BLOOD PRESSURE: 134 MMHG | HEART RATE: 78 BPM | BODY MASS INDEX: 29.86 KG/M2 | TEMPERATURE: 96.9 F | OXYGEN SATURATION: 96 %

## 2021-07-20 DIAGNOSIS — N18.31 STAGE 3A CHRONIC KIDNEY DISEASE (HCC): ICD-10-CM

## 2021-07-20 DIAGNOSIS — Z79.4 TYPE 2 DIABETES MELLITUS WITH DIABETIC NEUROPATHY, WITH LONG-TERM CURRENT USE OF INSULIN (HCC): ICD-10-CM

## 2021-07-20 DIAGNOSIS — R94.8 ABNORMAL RADIONUCLIDE BONE SCAN: Primary | ICD-10-CM

## 2021-07-20 DIAGNOSIS — E11.40 TYPE 2 DIABETES MELLITUS WITH DIABETIC NEUROPATHY, WITH LONG-TERM CURRENT USE OF INSULIN (HCC): ICD-10-CM

## 2021-07-20 DIAGNOSIS — Z85.46 HISTORY OF PROSTATE CANCER: ICD-10-CM

## 2021-07-20 LAB — HBA1C MFR BLD: 7.1 %

## 2021-07-20 PROCEDURE — 1036F TOBACCO NON-USER: CPT | Performed by: INTERNAL MEDICINE

## 2021-07-20 PROCEDURE — 3051F HG A1C>EQUAL 7.0%<8.0%: CPT | Performed by: INTERNAL MEDICINE

## 2021-07-20 PROCEDURE — 99213 OFFICE O/P EST LOW 20 MIN: CPT | Performed by: INTERNAL MEDICINE

## 2021-07-20 PROCEDURE — 4040F PNEUMOC VAC/ADMIN/RCVD: CPT | Performed by: INTERNAL MEDICINE

## 2021-07-20 PROCEDURE — 83036 HEMOGLOBIN GLYCOSYLATED A1C: CPT | Performed by: INTERNAL MEDICINE

## 2021-07-20 PROCEDURE — G8417 CALC BMI ABV UP PARAM F/U: HCPCS | Performed by: INTERNAL MEDICINE

## 2021-07-20 PROCEDURE — 1123F ACP DISCUSS/DSCN MKR DOCD: CPT | Performed by: INTERNAL MEDICINE

## 2021-07-20 PROCEDURE — G8427 DOCREV CUR MEDS BY ELIG CLIN: HCPCS | Performed by: INTERNAL MEDICINE

## 2021-07-20 ASSESSMENT — ENCOUNTER SYMPTOMS
EYE DISCHARGE: 0
FACIAL SWELLING: 0
PHOTOPHOBIA: 0
SHORTNESS OF BREATH: 0
ANAL BLEEDING: 0
TROUBLE SWALLOWING: 0
EYE PAIN: 0
WHEEZING: 0
DIARRHEA: 0
COUGH: 0
BLOOD IN STOOL: 0
VOMITING: 0
RHINORRHEA: 0
COLOR CHANGE: 0
STRIDOR: 0
NAUSEA: 0
EYE ITCHING: 0
CONSTIPATION: 0
SORE THROAT: 0
ABDOMINAL PAIN: 0

## 2021-07-20 NOTE — PROGRESS NOTES
2021    Name: Ashutosh Paez : 1940 Sex: male  Age: 68 y.o. Subjective:  Chief Complaint   Patient presents with    Results     follow up from testing        HPI     Patient is here to discuss his test results. .    CT scan showed a pulmonary nodule which will need to be followed as an outpatient. He also had a bone scan which showed abnormal findings in the pelvis and left femur. As patient has a history of prostate CA. CT scan of his chest was done and this revealed calcified plaques consistent with asbestos exposure along with pulmonary nodule which has not increased in size. We will continue to monitor. We did a radionucleotide bone scan on him and it did not show any areas of uptake in his pelvis or left femur. He has some changes in his wrists, hips, shoulders, knees and ankles consistent with osteoarthritis. He had punctuate focal uptake in the right malar region and at the level of the right lateral orbital wall. I am not sure what this is from. He has no pain in this area. But because of his history of prostate cancer we will go ahead and send him to Dr. Matt Santillan to see if she can sort this out or if this is something we do not need to be concerned about. Patient has a history of type 2 diabetes mellitus on insulin, diabetic neuropathy, CVA, hypertension, paroxysmal SVT, hyperlipidemia, prostate CA, pulmonary nodules and a history of asbestosis. He tripped at home and fell over an ironing board leg and had a rug burn on his right buttock. There was bleeding but this has since resolved and he no longer has pain in the area    Review of Systems   Constitutional: Positive for fatigue. Negative for appetite change and unexpected weight change. HENT: Negative for congestion, ear pain, facial swelling, rhinorrhea, sore throat, tinnitus and trouble swallowing. Eyes: Negative for photophobia, pain, discharge, itching and visual disturbance.    Respiratory: Negative for cough, shortness of breath, wheezing and stridor. Cardiovascular: Negative for chest pain, palpitations and leg swelling. Gastrointestinal: Negative for abdominal pain, anal bleeding, blood in stool, constipation, diarrhea, nausea and vomiting. Endocrine: Negative for cold intolerance, heat intolerance, polydipsia, polyphagia and polyuria. Genitourinary: Negative for difficulty urinating, dysuria, flank pain, frequency, hematuria and urgency. Musculoskeletal: Negative for arthralgias, gait problem, joint swelling and myalgias. Skin: Negative for color change, pallor and rash. Allergic/Immunologic: Negative for environmental allergies and food allergies. Neurological: Positive for weakness and numbness. Negative for dizziness, tremors, seizures, syncope, speech difficulty, light-headedness and headaches. Paresthesias and numbness of both legs below the knee   Hematological: Negative for adenopathy. Does not bruise/bleed easily. Psychiatric/Behavioral: Negative for agitation, behavioral problems, confusion, sleep disturbance and suicidal ideas. The patient is not nervous/anxious. Current Outpatient Medications:     empagliflozin (JARDIANCE) 25 MG tablet, Empagliflozin (Jardiance) 25 MG Tablet Active 25 MG PO Daily May 17th, 2021 5:49pm, Disp: , Rfl:     cefdinir (OMNICEF) 300 MG capsule, TAKE ONE CAPSULE BY MOUTH EVERY 12 HOURS, Disp: , Rfl:     Semaglutide (OZEMPIC, 1 MG/DOSE, SC), Semaglutide (Ozempic) 1 MG/0.75 ML Pen. Injctr Active May 17th, 2021 5:49pm, Disp: , Rfl:     Cyanocobalamin (VITAMIN B 12) 100 MCG LOZG, Take 500 mcg by mouth, Disp: , Rfl:     Omega-3 Fatty Acids (FISH OIL) 1000 MG CAPS, Take 1,200 mg by mouth, Disp: , Rfl:     brimonidine (ALPHAGAN) 0.2 % ophthalmic solution, INSTILL 1 DROP INTO THE RIGHT EYE TWICE A DAY, Disp: , Rfl:     cycloSPORINE (RESTASIS) 0.05 % ophthalmic emulsion, cycloSPORINE Restasis 0.05 drops 2 times per day OU Active   [facility] [id number] ([id number]), Disp: , Rfl:     Magnesium Oxide 420 MG TABS, TAKE ONE TABLET BY MOUTH TWICE A DAY, Disp: , Rfl:     Omega-3 Fatty Acids (FISH OIL) 1200 MG CAPS, Take 1,200 mg by mouth, Disp: , Rfl:     metFORMIN (GLUCOPHAGE) 1000 MG tablet, Take 1,000 mg by mouth daily (with breakfast), Disp: , Rfl:     Rosuvastatin Calcium 20 MG CPSP, Take by mouth daily, Disp: , Rfl:     Misc. Devices (ALL-BODY MASSAGE) MISC, Massage as needed for generalized body pain, Disp: 1 each, Rfl: 5    timolol (TIMOPTIC) 0.5 % ophthalmic solution, , Disp: , Rfl:     HM OMEGA-3-6-9 FATTY ACIDS CAPS, Take 1,200 mg by mouth daily, Disp: , Rfl:     pregabalin (LYRICA) 75 MG capsule, Take 75 mg by mouth 2 times daily. , Disp: , Rfl:     lisinopril (PRINIVIL;ZESTRIL) 10 MG tablet, Take 10 mg by mouth daily, Disp: , Rfl:     clopidogrel (PLAVIX) 75 MG tablet, Take 75 mg by mouth daily, Disp: , Rfl:     vitamin D (CHOLECALCIFEROL) 1000 UNIT TABS tablet, Take 1,000 Units by mouth daily, Disp: , Rfl:     Multiple Vitamins-Minerals (MULTI FOR HIM PO), Take by mouth daily, Disp: , Rfl:     aspirin 81 MG tablet, Take 81 mg by mouth 2 times daily , Disp: , Rfl:     verapamil (CALAN SR) 180 MG extended release tablet, Take 180 mg by mouth nightly, Disp: , Rfl:     insulin glargine (LANTUS) 100 UNIT/ML injection vial, Inject 34 Units into the skin As directed , Disp: , Rfl:      No Known Allergies     Past Medical History:   Diagnosis Date    [facility]cere[facility] vascular [facility]     Gastroparesis     H/O asbestosis     History of paroxysmal supraventricular tachycardia 6/11/2019    Neuropathy     Prostate CA (HCC)     Sleep apnea with use of continuous positive airway pressure (CPAP)     TIA (transient ischemic attack)        Health Maintenance Due   Topic Date Due    Hepatitis C screen  Never done    PSA counseling  Never done    DTaP/Tdap/Td vaccine (1 - Tdap) 08/22/2002    Lipid screen  06/17/2021 Patient Active Problem List   Diagnosis    Essential hypertension    Type 2 diabetes mellitus ([facility]    CVA [facility]cerebral vascular accident) ([facility]    Neuropathy due to type 2 diabetes mellitus ([facility]    Sleep apnea with use of continuous positive airway pressure (CPAP)    Mixed hyperlipidemia    H/O asbestosis    Prostate CA ([facility]    Lumbar and sacral osteoarthritis    History of paroxysmal supraventricular tachycardia    Neuropathy    Need for diphtheria-tetanus-pertussis (Tdap) vaccine    Vertigo    Stage 3 chronic kidney disease (HCC)    Pyelonephritis    Pulmonary nodules    Abnormal x-ray of pelvis    Unsteady gait when walking    Abnormal radionuclide bone scan    History of prostate cancer        Past Surgical History:   Procedure Laterality Date    CERVICAL DISC SURGERY      CHOLECYSTECTOMY      KNEE ARTHROPLASTY      PROSTATE BIOPSY          Family History   Problem Relation Age of Onset    Dementia Mother         Social History     Tobacco Use    Smoking status: Never Smoker    Smokeless tobacco: Never Used   Substance Use Topics    Alcohol use: Not Currently    Drug use: Never        Objective  Vitals:    07/20/21 1403   BP: 134/64   Pulse: 78   Temp: 96.9 °F (36.1 °C)   SpO2: 96%   Weight: 197 lb (89.4 kg)   Height: 5' 8\" (1.727 m)        Exam:  Physical Exam  Vitals reviewed. Exam conducted with a chaperone present. Constitutional:       General: He is not in acute distress. Appearance: Normal appearance. He is well-developed. He is not ill-appearing. HENT:      Head: Normocephalic. Right Ear: External ear normal.      Left Ear: External ear normal.   Eyes:      General: No scleral icterus. Right eye: No discharge. Left eye: No discharge. Conjunctiva/sclera: Conjunctivae normal.      Pupils: Pupils are equal, round, and reactive to light. Neck:      Thyroid: No thyromegaly.    Cardiovascular:      Rate and Rhythm: Normal rate and regular rhythm. Heart sounds: Normal heart sounds. No murmur heard. No friction rub. No gallop. Pulmonary:      Effort: Pulmonary effort is normal. No respiratory distress. Breath sounds: Normal breath sounds. No wheezing or rales. Chest:      Chest wall: No tenderness. Abdominal:      General: Bowel sounds are normal. There is no distension. Palpations: Abdomen is soft. There is no mass. Tenderness: There is no abdominal tenderness. There is no guarding or rebound. Musculoskeletal:         General: No tenderness or deformity. Normal range of motion. Cervical back: Normal range of motion and neck supple. Lymphadenopathy:      Cervical: No cervical adenopathy. Skin:     General: Skin is warm and dry. Coloration: Skin is not pale. Findings: No erythema or rash. Neurological:      Mental Status: He is alert and oriented to person, place, and time. Cranial Nerves: No cranial nerve deficit. Sensory: Sensory deficit present. Deep Tendon Reflexes: Reflexes normal.      Comments: Decreased sensation to light touch below the knee bilaterally   Psychiatric:         Mood and Affect: Mood normal.         Behavior: Behavior normal.         Thought Content: Thought content normal.         Judgment: Judgment normal.          Last labs reviewed. ASSESSMENT & PLAN :   Problem List        Endocrine    Type 2 diabetes mellitus ([facility]       monitor blood sugars, watch diet. Hemoglobin A1c done today was 7.1%.          Relevant Medications    insulin glargine (LANTUS) 100 UNIT/ML injection vial    metFORMIN (GLUCOPHAGE) 1000 MG tablet    empagliflozin (JARDIANCE) 25 MG tablet    Semaglutide (OZEMPIC, 1 MG/DOSE, SC)    Other Relevant Orders    POCT glycosylated hemoglobin (Hb A1C) (Completed)       Genitourinary    Stage 3 chronic kidney disease (HCC)       avoid NSAID use and will monitor            Other    Abnormal radionuclide bone scan - Primary       refer to  Ck Alas and see if we need to work this up         Relevant Orders    Amb External Referral To Hematology Oncology    History of prostate cancer       follow-up with urology         Relevant Orders    Amb External Referral To Hematology Oncology           Return in about 3 months (around 10/20/2021), or aaron Steen, DO  7/20/2021

## 2021-09-08 LAB
ALBUMIN SERPL-MCNC: NORMAL G/DL
ALP BLD-CCNC: NORMAL U/L
ALT SERPL-CCNC: NORMAL U/L
ANION GAP SERPL CALCULATED.3IONS-SCNC: NORMAL MMOL/L
AST SERPL-CCNC: NORMAL U/L
AVERAGE GLUCOSE: NORMAL
BILIRUB SERPL-MCNC: NORMAL MG/DL
BUN BLDV-MCNC: NORMAL MG/DL
CALCIUM SERPL-MCNC: NORMAL MG/DL
CHLORIDE BLD-SCNC: NORMAL MMOL/L
CHOLESTEROL, TOTAL: NORMAL
CHOLESTEROL/HDL RATIO: NORMAL
CO2: NORMAL
CREAT SERPL-MCNC: NORMAL MG/DL
CREATININE, URINE: 81.2
GFR CALCULATED: NORMAL
GLUCOSE BLD-MCNC: NORMAL MG/DL
HBA1C MFR BLD: 7.2 %
HDLC SERPL-MCNC: NORMAL MG/DL
LDL CHOLESTEROL CALCULATED: NORMAL
MICROALBUMIN/CREAT 24H UR: 12.4 MG/G{CREAT}
MICROALBUMIN/CREAT UR-RTO: 152.7
NONHDLC SERPL-MCNC: NORMAL MG/DL
POTASSIUM SERPL-SCNC: NORMAL MMOL/L
SODIUM BLD-SCNC: NORMAL MMOL/L
TOTAL PROTEIN: NORMAL
TRIGL SERPL-MCNC: NORMAL MG/DL
VLDLC SERPL CALC-MCNC: NORMAL MG/DL

## 2021-10-20 ENCOUNTER — OFFICE VISIT (OUTPATIENT)
Dept: PRIMARY CARE CLINIC | Age: 81
End: 2021-10-20
Payer: MEDICARE

## 2021-10-20 VITALS
BODY MASS INDEX: 30.31 KG/M2 | TEMPERATURE: 97.5 F | HEIGHT: 68 IN | WEIGHT: 200 LBS | DIASTOLIC BLOOD PRESSURE: 74 MMHG | HEART RATE: 87 BPM | OXYGEN SATURATION: 96 % | SYSTOLIC BLOOD PRESSURE: 138 MMHG

## 2021-10-20 DIAGNOSIS — G62.9 NEUROPATHY: ICD-10-CM

## 2021-10-20 DIAGNOSIS — Z85.46 HISTORY OF PROSTATE CANCER: ICD-10-CM

## 2021-10-20 DIAGNOSIS — E11.40 TYPE 2 DIABETES MELLITUS WITH DIABETIC NEUROPATHY, WITH LONG-TERM CURRENT USE OF INSULIN (HCC): ICD-10-CM

## 2021-10-20 DIAGNOSIS — I10 ESSENTIAL HYPERTENSION: Primary | ICD-10-CM

## 2021-10-20 DIAGNOSIS — R91.8 PULMONARY NODULES: ICD-10-CM

## 2021-10-20 DIAGNOSIS — E78.2 MIXED HYPERLIPIDEMIA: ICD-10-CM

## 2021-10-20 DIAGNOSIS — Z79.4 TYPE 2 DIABETES MELLITUS WITH DIABETIC NEUROPATHY, WITH LONG-TERM CURRENT USE OF INSULIN (HCC): ICD-10-CM

## 2021-10-20 DIAGNOSIS — G47.30 SLEEP APNEA WITH USE OF CONTINUOUS POSITIVE AIRWAY PRESSURE (CPAP): ICD-10-CM

## 2021-10-20 DIAGNOSIS — Z87.09 H/O ASBESTOSIS: ICD-10-CM

## 2021-10-20 DIAGNOSIS — N18.31 STAGE 3A CHRONIC KIDNEY DISEASE (HCC): ICD-10-CM

## 2021-10-20 PROCEDURE — G8427 DOCREV CUR MEDS BY ELIG CLIN: HCPCS | Performed by: INTERNAL MEDICINE

## 2021-10-20 PROCEDURE — G8417 CALC BMI ABV UP PARAM F/U: HCPCS | Performed by: INTERNAL MEDICINE

## 2021-10-20 PROCEDURE — 3051F HG A1C>EQUAL 7.0%<8.0%: CPT | Performed by: INTERNAL MEDICINE

## 2021-10-20 PROCEDURE — 1123F ACP DISCUSS/DSCN MKR DOCD: CPT | Performed by: INTERNAL MEDICINE

## 2021-10-20 PROCEDURE — G8482 FLU IMMUNIZE ORDER/ADMIN: HCPCS | Performed by: INTERNAL MEDICINE

## 2021-10-20 PROCEDURE — 4040F PNEUMOC VAC/ADMIN/RCVD: CPT | Performed by: INTERNAL MEDICINE

## 2021-10-20 PROCEDURE — 99213 OFFICE O/P EST LOW 20 MIN: CPT | Performed by: INTERNAL MEDICINE

## 2021-10-20 PROCEDURE — 1036F TOBACCO NON-USER: CPT | Performed by: INTERNAL MEDICINE

## 2021-10-20 ASSESSMENT — ENCOUNTER SYMPTOMS
ABDOMINAL PAIN: 0
VOMITING: 0
TROUBLE SWALLOWING: 0
RHINORRHEA: 0
ANAL BLEEDING: 0
PHOTOPHOBIA: 0
EYE DISCHARGE: 0
STRIDOR: 0
FACIAL SWELLING: 0
CONSTIPATION: 0
COLOR CHANGE: 0
EYE PAIN: 0
COUGH: 0
SORE THROAT: 0
EYE ITCHING: 0
WHEEZING: 0
DIARRHEA: 0
BLOOD IN STOOL: 0
NAUSEA: 0
SHORTNESS OF BREATH: 0

## 2021-10-20 NOTE — PROGRESS NOTES
10/20/2021    Name: Cash Jin : 1940 Sex: male  Age: 68 y.o. Subjective:  Chief Complaint   Patient presents with    Hypertension     3 month visit        HPI     . Roni Gatica CT scan showed a pulmonary nodule which will need to be followed as an outpatient. He also had a bone scan which showed abnormal findings in the pelvis and left femur. As patient has a history of prostate CA. CT scan of his chest was done and this revealed calcified plaques consistent with asbestos exposure along with pulmonary nodule which has not increased in size. We will continue to monitor. We did a radionucleotide bone scan on him and it did not show any areas of uptake in his pelvis or left femur. He has some changes in his wrists, hips, shoulders, knees and ankles consistent with osteoarthritis. He had punctuate focal uptake in the right malar region and at the level of the right lateral orbital wall. I am not sure what this is from. He has no pain in this area. Because of his history of prostate cancer we referred him to Dr. Montenegro Back. Reviewed her report. She did not think that there was anything to be concerned about and she will just monitor this. Patient has a history of type 2 diabetes mellitus on insulin, diabetic neuropathy, CVA, hypertension, paroxysmal SVT, hyperlipidemia, prostate CA, pulmonary nodules and a history of asbestosis. He was seen at the [facility] on 2021 reviewed his reports. His hemoglobin A1c was 7.2%. Reviewed his medications which are the same ones that are on our chart. .  His lipids were good with the exception of his triglycerides which were 170. Total cholesterol 119, LDL cholesterol 67, HDL cholesterol low at 36. Vitamin B12 free T4 TSH CBC were normal.  Fasting blood sugar was 117, total bilirubin is 2.0. Kidney functions and liver enzymes were normal.  Urine microalbumin was slightly elevated at 12.4 with slightly increased ratio of 152.7.     He had his Tdap and his Pneumovax given at the [place] [facility] on 9/13/2021. He saw his cardiologist Dr. Marika Caballero yesterday. He was pleased with his progress. EKG was done and I have the report tracing. There were no abnormalities this was a normal tracing. He was continued on the same medications. He had his high-dose flu shot given on 10/18/2021 at a local pharmacy. Review of Systems   Constitutional: Positive for fatigue. Negative for appetite change and unexpected weight change. HENT: Negative for congestion, ear pain, facial swelling, rhinorrhea, sore throat, tinnitus and trouble swallowing. Hard of hearing   Eyes: Negative for photophobia, pain, discharge, itching and visual disturbance. Respiratory: Negative for cough, shortness of breath, wheezing and stridor. Cardiovascular: Negative for chest pain, palpitations and leg swelling. Gastrointestinal: Negative for abdominal pain, anal bleeding, blood in stool, constipation, diarrhea, nausea and vomiting. Endocrine: Negative for cold intolerance, heat intolerance, polydipsia, polyphagia and polyuria. Genitourinary: Negative for difficulty urinating, dysuria, flank pain, frequency, hematuria and urgency. Musculoskeletal: Negative for arthralgias, gait problem, joint swelling and myalgias. Skin: Negative for color change, pallor and rash. Allergic/Immunologic: Negative for environmental allergies and food allergies. Neurological: Positive for weakness and numbness. Negative for dizziness, tremors, seizures, syncope, speech difficulty, light-headedness and headaches. Paresthesias and numbness of both legs below the knee   Hematological: Negative for adenopathy. Does not bruise/bleed easily. Psychiatric/Behavioral: Negative for agitation, behavioral problems, confusion, sleep disturbance and suicidal ideas. The patient is not nervous/anxious.            Current Outpatient Medications:     empagliflozin (JARDIANCE) 25 MG tablet, Empagliflozin (Jardiance) 25 MG Tablet Active 25 MG PO Daily May 17th, 2021 5:49pm, Disp: , Rfl:     cefdinir (OMNICEF) 300 MG capsule, TAKE ONE CAPSULE BY MOUTH EVERY 12 HOURS, Disp: , Rfl:     Semaglutide (OZEMPIC, 1 MG/DOSE, SC), Semaglutide (Ozempic) 1 MG/0.75 ML Pen. Injctr Active May 17th, 2021 5:49pm, Disp: , Rfl:     Cyanocobalamin (VITAMIN B 12) 100 MCG LOZG, Take 500 mcg by mouth, Disp: , Rfl:     Omega-3 Fatty Acids (FISH OIL) 1000 MG CAPS, Take 1,200 mg by mouth, Disp: , Rfl:     brimonidine (ALPHAGAN) 0.2 % ophthalmic solution, INSTILL 1 DROP INTO THE RIGHT EYE TWICE A DAY, Disp: , Rfl:     cycloSPORINE (RESTASIS) 0.05 % ophthalmic emulsion, cycloSPORINE Restasis 0.05 drops 2 times per day OU Active   [facility] ([id number]), Disp: , Rfl:     Magnesium Oxide 420 MG TABS, TAKE ONE TABLET BY MOUTH TWICE A DAY, Disp: , Rfl:     Omega-3 Fatty Acids (FISH OIL) 1200 MG CAPS, Take 1,200 mg by mouth, Disp: , Rfl:     metFORMIN (GLUCOPHAGE) 1000 MG tablet, Take 1,000 mg by mouth daily (with breakfast), Disp: , Rfl:     Rosuvastatin Calcium 20 MG CPSP, Take by mouth daily, Disp: , Rfl:     Misc. Devices (ALL-BODY MASSAGE) MISC, Massage as needed for generalized body pain, Disp: 1 each, Rfl: 5    timolol (TIMOPTIC) 0.5 % ophthalmic solution, , Disp: , Rfl:     HM OMEGA-3-6-9 FATTY ACIDS CAPS, Take 1,200 mg by mouth daily, Disp: , Rfl:     pregabalin (LYRICA) 75 MG capsule, Take 75 mg by mouth 2 times daily. , Disp: , Rfl:     lisinopril (PRINIVIL;ZESTRIL) 10 MG tablet, Take 10 mg by mouth daily, Disp: , Rfl:     clopidogrel (PLAVIX) 75 MG tablet, Take 75 mg by mouth daily, Disp: , Rfl:     vitamin D (CHOLECALCIFEROL) 1000 UNIT TABS tablet, Take 1,000 Units by mouth daily, Disp: , Rfl:     Multiple Vitamins-Minerals (MULTI FOR HIM PO), Take by mouth daily, Disp: , Rfl:     aspirin 81 MG tablet, Take 81 mg by mouth 2 times daily , Disp: , Rfl:     verapamil (CALAN SR) 180 MG extended release tablet, Take 180 mg by mouth nightly, Disp: , Rfl:     insulin glargine (LANTUS) 100 UNIT/ML injection vial, Inject 34 Units into the skin As directed , Disp: , Rfl:      No Known Allergies     Past Medical History:   Diagnosis Date    CVA (cerebral vascular accident) ([facility]     Gastroparesis     H/O asbestosis     History of paroxysmal supraventricular tachycardia 6/11/2019    Neuropathy     Prostate CA ([facility]     Sleep apnea with use of continuous positive airway pressure (CPAP)     TIA (transient ischemic attack)        Health Maintenance Due   Topic Date Due    Hepatitis C screen  Never done    PSA counseling  Never done    COVID-19 Vaccine (3 - Pfizer booster) 08/12/2021        Patient Active Problem List   Diagnosis    Essential hypertension    Type 2 diabetes mellitus ([facility]    CVA (cerebral vascular accident) ([facility]    Neuropathy due to type 2 diabetes mellitus ([facility]    Sleep apnea with use of continuous positive airway pressure (CPAP)    Mixed hyperlipidemia    H/O asbestosis    Prostate CA ([facility]    Lumbar and sacral osteoarthritis    History of paroxysmal supraventricular tachycardia    Neuropathy    Need for diphtheria-tetanus-pertussis (Tdap) vaccine    Vertigo    Stage 3 chronic kidney disease (HCC)    Pyelonephritis    Pulmonary nodules    Abnormal x-ray of pelvis    Unsteady gait when walking    Abnormal radionuclide bone scan    History of prostate cancer        Past Surgical History:   Procedure Laterality Date    CERVICAL DISC SURGERY      CHOLECYSTECTOMY      KNEE ARTHROPLASTY      PROSTATE BIOPSY          Family History   Problem Relation Age of Onset    Dementia Mother         Social History     Tobacco Use    Smoking status: Never Smoker    Smokeless tobacco: Never Used   Substance Use Topics    Alcohol use: Not Currently    Drug use: Never        Objective  Vitals:    10/20/21 1326   BP: 138/74   Pulse: 87   Temp: 97.5 °F (36.4 °C)   SpO2: 96%   Weight: 200 lb (90.7 kg)   Height: 5' 8\" (1.727 m)        Exam:  Physical Exam  Vitals reviewed. Exam conducted with a chaperone present. Constitutional:       General: He is not in acute distress. Appearance: Normal appearance. He is well-developed. He is not ill-appearing. HENT:      Head: Normocephalic and atraumatic. Right Ear: External ear normal.      Left Ear: External ear normal.   Eyes:      General: No scleral icterus. Right eye: No discharge. Left eye: No discharge. Conjunctiva/sclera: Conjunctivae normal.   Neck:      Thyroid: No thyromegaly. Cardiovascular:      Rate and Rhythm: Normal rate and regular rhythm. Heart sounds: Normal heart sounds. No murmur heard. No friction rub. No gallop. Pulmonary:      Effort: Pulmonary effort is normal. No respiratory distress. Breath sounds: Normal breath sounds. No wheezing or rales. Chest:      Chest wall: No tenderness. Abdominal:      General: Bowel sounds are normal. There is no distension. Palpations: Abdomen is soft. There is no mass. Tenderness: There is no abdominal tenderness. There is no guarding or rebound. Musculoskeletal:         General: No tenderness or deformity. Normal range of motion. Cervical back: Normal range of motion and neck supple. Lymphadenopathy:      Cervical: No cervical adenopathy. Skin:     General: Skin is warm and dry. Coloration: Skin is not pale. Findings: No erythema or rash. Neurological:      Mental Status: He is alert and oriented to person, place, and time. Cranial Nerves: No cranial nerve deficit. Sensory: Sensory deficit present. Deep Tendon Reflexes: Reflexes normal.      Comments: Decreased sensation to light touch below the knee bilaterally   Psychiatric:         Mood and Affect: Mood normal.         Behavior: Behavior normal.         Thought Content:  Thought content normal.         Judgment: Judgment normal. Last labs reviewed. ASSESSMENT & PLAN :   Problem List        Circulatory    Essential hypertension - Primary     Blood pressures are stable. Continue medications and monitor blood pressures at home. Call office if systolics are over 783 over diastolics over 90. Respiratory    Sleep apnea with use of continuous positive airway pressure (CPAP)       continue using his CPAP as directed            Endocrine    Type 2 diabetes mellitus (HCC)       continue medications and monitor blood sugars as directed         Relevant Medications    insulin glargine (LANTUS) 100 UNIT/ML injection vial    metFORMIN (GLUCOPHAGE) 1000 MG tablet    empagliflozin (JARDIANCE) 25 MG tablet    Semaglutide (OZEMPIC, 1 MG/DOSE, SC)       Nervous and Auditory    Neuropathy       Genitourinary    Stage 3 chronic kidney disease (HCC)       avoid NSAID use and will monitor            Other    Mixed hyperlipidemia     Watch saturated fats in diet and will monitor lipids  continue rosuvastatin 20 mg daily         Relevant Medications    lisinopril (PRINIVIL;ZESTRIL) 10 MG tablet    aspirin 81 MG tablet    verapamil (CALAN SR) 180 MG extended release tablet    Rosuvastatin Calcium 20 MG CPSP    H/O asbestosis       monitor chest x-rays         Pulmonary nodules       surveillance as directed         History of prostate cancer       follow-up with Dr. Mindy Edward and urologist                Return in about 3 months (around 1/20/2022), or htn, dm and AWV.        Nieves Geronimo DO  10/20/2021

## 2021-10-20 NOTE — ASSESSMENT & PLAN NOTE
Blood pressures are stable. Continue medications and monitor blood pressures at home. Call office if systolics are over 356 over diastolics over 90.

## 2021-10-30 ENCOUNTER — NURSE TRIAGE (OUTPATIENT)
Dept: OTHER | Facility: CLINIC | Age: 81
End: 2021-10-30

## 2021-11-01 ENCOUNTER — VIRTUAL VISIT (OUTPATIENT)
Dept: PRIMARY CARE CLINIC | Age: 81
End: 2021-11-01
Payer: MEDICARE

## 2021-11-01 DIAGNOSIS — B34.9 ACUTE VIRAL SYNDROME: ICD-10-CM

## 2021-11-01 PROCEDURE — 99442 PR PHYS/QHP TELEPHONE EVALUATION 11-20 MIN: CPT | Performed by: INTERNAL MEDICINE

## 2021-11-01 ASSESSMENT — ENCOUNTER SYMPTOMS
EYE PAIN: 0
TROUBLE SWALLOWING: 0
FACIAL SWELLING: 0
SORE THROAT: 0
STRIDOR: 0
VOMITING: 0
ABDOMINAL PAIN: 0
WHEEZING: 0
EYE DISCHARGE: 0
PHOTOPHOBIA: 0
COUGH: 1
BLOOD IN STOOL: 0
RHINORRHEA: 0
ANAL BLEEDING: 0
SHORTNESS OF BREATH: 0
CONSTIPATION: 0
NAUSEA: 0
DIARRHEA: 0
COLOR CHANGE: 0
EYE ITCHING: 0

## 2021-11-01 NOTE — PROGRESS NOTES
his progress. EKG was done and I have the report tracing. There were no abnormalities this was a normal tracing. He was continued on the same medications. He had his high-dose flu shot given on 10/18/2021 at a local pharmacy. Review of Systems   Constitutional: Positive for chills and fatigue. Negative for appetite change and unexpected weight change. HENT: Negative for congestion, ear pain, facial swelling, rhinorrhea, sore throat, tinnitus and trouble swallowing. Hard of hearing   Eyes: Negative for photophobia, pain, discharge, itching and visual disturbance. Respiratory: Positive for cough. Negative for shortness of breath, wheezing and stridor. Cardiovascular: Negative for chest pain, palpitations and leg swelling. Gastrointestinal: Negative for abdominal pain, anal bleeding, blood in stool, constipation, diarrhea, nausea and vomiting. Endocrine: Negative for cold intolerance, heat intolerance, polydipsia, polyphagia and polyuria. Genitourinary: Negative for difficulty urinating, dysuria, flank pain, frequency, hematuria and urgency. Musculoskeletal: Negative for arthralgias, gait problem, joint swelling and myalgias. Skin: Negative for color change, pallor and rash. Allergic/Immunologic: Negative for environmental allergies and food allergies. Neurological: Positive for dizziness, weakness and numbness. Negative for tremors, seizures, syncope, speech difficulty, light-headedness and headaches. Paresthesias and numbness of both legs below the knee   Hematological: Negative for adenopathy. Does not bruise/bleed easily. Psychiatric/Behavioral: Negative for agitation, behavioral problems, confusion, sleep disturbance and suicidal ideas. The patient is not nervous/anxious.            Current Outpatient Medications:     empagliflozin (JARDIANCE) 25 MG tablet, Empagliflozin (Jardiance) 25 MG Tablet Active 25 MG PO Daily May 17th, 2021 5:49pm, Disp: , Rfl:     cefdinir (OMNICEF) 300 MG capsule, TAKE ONE CAPSULE BY MOUTH EVERY 12 HOURS, Disp: , Rfl:     Semaglutide (OZEMPIC, 1 MG/DOSE, SC), Semaglutide (Ozempic) 1 MG/0.75 ML Pen. Injctr Active May 17th, 2021 5:49pm, Disp: , Rfl:     Cyanocobalamin (VITAMIN B 12) 100 MCG LOZG, Take 500 mcg by mouth, Disp: , Rfl:     Omega-3 Fatty Acids (FISH OIL) 1000 MG CAPS, Take 1,200 mg by mouth, Disp: , Rfl:     brimonidine (ALPHAGAN) 0.2 % ophthalmic solution, INSTILL 1 DROP INTO THE RIGHT EYE TWICE A DAY, Disp: , Rfl:     cycloSPORINE (RESTASIS) 0.05 % ophthalmic emulsion, cycloSPORINE Restasis 0.05 drops 2 times per day OU Active   [facility] ([id number]), Disp: , Rfl:     Magnesium Oxide 420 MG TABS, TAKE ONE TABLET BY MOUTH TWICE A DAY, Disp: , Rfl:     Omega-3 Fatty Acids (FISH OIL) 1200 MG CAPS, Take 1,200 mg by mouth, Disp: , Rfl:     metFORMIN (GLUCOPHAGE) 1000 MG tablet, Take 1,000 mg by mouth daily (with breakfast), Disp: , Rfl:     Rosuvastatin Calcium 20 MG CPSP, Take by mouth daily, Disp: , Rfl:     Misc. Devices (ALL-BODY MASSAGE) MISC, Massage as needed for generalized body pain, Disp: 1 each, Rfl: 5    timolol (TIMOPTIC) 0.5 % ophthalmic solution, , Disp: , Rfl:     HM OMEGA-3-6-9 FATTY ACIDS CAPS, Take 1,200 mg by mouth daily, Disp: , Rfl:     pregabalin (LYRICA) 75 MG capsule, Take 75 mg by mouth 2 times daily. , Disp: , Rfl:     lisinopril (PRINIVIL;ZESTRIL) 10 MG tablet, Take 10 mg by mouth daily, Disp: , Rfl:     clopidogrel (PLAVIX) 75 MG tablet, Take 75 mg by mouth daily, Disp: , Rfl:     vitamin D (CHOLECALCIFEROL) 1000 UNIT TABS tablet, Take 1,000 Units by mouth daily, Disp: , Rfl:     Multiple Vitamins-Minerals (MULTI FOR HIM PO), Take by mouth daily, Disp: , Rfl:     aspirin 81 MG tablet, Take 81 mg by mouth 2 times daily , Disp: , Rfl:     verapamil (CALAN SR) 180 MG extended release tablet, Take 180 mg by mouth nightly, Disp: , Rfl:     insulin glargine (LANTUS) 100 UNIT/ML injection vial, Inject 34 Units into the skin As directed , Disp: , Rfl:      No Known Allergies     Past Medical History:   Diagnosis Date    [facility]     Gastroparesis     H/O asbestosis     History of paroxysmal supraventricular tachycardia 6/11/2019    Neuropathy     Prostate CA (HCC)     Sleep apnea with use of continuous positive airway pressure (CPAP)     TIA (transient ischemic attack)        Health Maintenance Due   Topic Date Due    Hepatitis C screen  Never done    PSA counseling  Never done    COVID-19 Vaccine (3 - Pfizer booster) 08/12/2021        Patient Active Problem List   Diagnosis    Essential hypertension    Type 2 diabetes mellitus ([address]    CVA (cerebral vascular accident) ([address]    Neuropathy due to type 2 diabetes mellitus ([facility] [facility]    Sleep apnea with use of continuous positive airway pressure (CPAP)    Mixed hyperlipidemia    H/O asbestosis    Prostate CA ([address]    Lumbar and sacral osteoarthritis    History of paroxysmal supraventricular tachycardia    Neuropathy    Need for diphtheria-tetanus-pertussis (Tdap) vaccine    Vertigo    Stage 3 chronic kidney disease (HCC)    Pyelonephritis    Pulmonary nodules    Abnormal x-ray of pelvis    Unsteady gait when walking    Abnormal radionuclide bone scan    History of prostate cancer    Acute viral syndrome        Past Surgical History:   Procedure Laterality Date    CERVICAL DISC SURGERY      CHOLECYSTECTOMY      KNEE ARTHROPLASTY      PROSTATE BIOPSY          Family History   Problem Relation Age of Onset    Dementia Mother         Social History     Tobacco Use    Smoking status: Never Smoker    Smokeless tobacco: Never Used   Substance Use Topics    Alcohol use: Not Currently    Drug use: Never        Objective  There were no vitals filed for this visit. Exam:      Last labs reviewed.       ASSESSMENT & PLAN :   Problem List        Other    Acute viral syndrome       patient feels he is getting better. It has been over 10 days since the onset of symptoms so even if he is Covid positive he is not a candidate for monoclonal antibodies at this time. If he gets short of breath or if his condition worsens he was told to go to the nearest emergency room. He is to continue Tylenol as needed. Increase his fluids and rest.  He is to let me know if his condition does not improve this week. He will come and see me then                Return in about 1 week (around 11/8/2021), or if symptoms worsen or fail to improve. Alejandra Layton, DO  11/2/2021     Aleah Mcpherson is a 68 y.o. male evaluated via telephone on 11/1/2021. Consent:  He and/or health care decision maker is aware that that he may receive a bill for this telephone service, depending on his insurance coverage, and has provided verbal consent to proceed: Yes      Documentation:  I communicated with the patient and/or health care decision maker about weakness and chills  Details of this discussion including any medical advice provided: See attached note    Patient was located at his [facility] home address, PCP located at other [facility] address. Patient and his wife were involved in this phone call      I affirm this is a Patient Initiated Episode with a Patient who has not had a related appointment within my department in the past 7 days or scheduled within the next 24 hours. Patient identification was verified at the start of the visit: Yes    Total Time: minutes: 11-20 minutes    The visit was conducted pursuant to the emergency declaration under the [facility], [address] [place] [address] authority and the [employer] General Act. Patient identification was verified, and a caregiver was present when appropriate. The patient was located in a state where the provider was credentialed to provide care.     Note: not billable if this call serves to triage the patient into an appointment for the relevant concern      Carmelina Gregory DO

## 2021-11-29 ENCOUNTER — TELEPHONE (OUTPATIENT)
Dept: PRIMARY CARE CLINIC | Age: 81
End: 2021-11-29

## 2021-11-29 NOTE — TELEPHONE ENCOUNTER
Wife called in and said her  was having severe pain in his side and she wanted you to order some testing. I advised that you are out of office until tomorrow and I advised she take him to the ED or have him seen through [facility]. She then asked if you could just order testing like an MRI. I advised that testing can not be ordered w/o the patient being seen, and having severe pain is something that he needs to be seen for. She asked if you would be in tomorrow and I told her that you would be, but still tried to encourage her to have him seen today.

## 2021-11-30 ENCOUNTER — OFFICE VISIT (OUTPATIENT)
Dept: PRIMARY CARE CLINIC | Age: 81
End: 2021-11-30
Payer: MEDICARE

## 2021-11-30 VITALS
DIASTOLIC BLOOD PRESSURE: 68 MMHG | OXYGEN SATURATION: 95 % | WEIGHT: 192 LBS | HEIGHT: 68 IN | SYSTOLIC BLOOD PRESSURE: 130 MMHG | TEMPERATURE: 98.4 F | HEART RATE: 74 BPM | BODY MASS INDEX: 29.1 KG/M2

## 2021-11-30 DIAGNOSIS — E11.40 TYPE 2 DIABETES MELLITUS WITH DIABETIC NEUROPATHY, WITH LONG-TERM CURRENT USE OF INSULIN (HCC): ICD-10-CM

## 2021-11-30 DIAGNOSIS — M25.551 HIP PAIN, CHRONIC, RIGHT: Primary | ICD-10-CM

## 2021-11-30 DIAGNOSIS — M53.3 CHRONIC RIGHT SACROILIAC PAIN: ICD-10-CM

## 2021-11-30 DIAGNOSIS — M47.817 LUMBAR AND SACRAL OSTEOARTHRITIS: ICD-10-CM

## 2021-11-30 DIAGNOSIS — G89.29 CHRONIC RIGHT SACROILIAC PAIN: ICD-10-CM

## 2021-11-30 DIAGNOSIS — G89.29 HIP PAIN, CHRONIC, RIGHT: Primary | ICD-10-CM

## 2021-11-30 DIAGNOSIS — Z79.4 TYPE 2 DIABETES MELLITUS WITH DIABETIC NEUROPATHY, WITH LONG-TERM CURRENT USE OF INSULIN (HCC): ICD-10-CM

## 2021-11-30 PROCEDURE — 1036F TOBACCO NON-USER: CPT | Performed by: INTERNAL MEDICINE

## 2021-11-30 PROCEDURE — 3051F HG A1C>EQUAL 7.0%<8.0%: CPT | Performed by: INTERNAL MEDICINE

## 2021-11-30 PROCEDURE — 1123F ACP DISCUSS/DSCN MKR DOCD: CPT | Performed by: INTERNAL MEDICINE

## 2021-11-30 PROCEDURE — 4040F PNEUMOC VAC/ADMIN/RCVD: CPT | Performed by: INTERNAL MEDICINE

## 2021-11-30 PROCEDURE — G8427 DOCREV CUR MEDS BY ELIG CLIN: HCPCS | Performed by: INTERNAL MEDICINE

## 2021-11-30 PROCEDURE — 99214 OFFICE O/P EST MOD 30 MIN: CPT | Performed by: INTERNAL MEDICINE

## 2021-11-30 PROCEDURE — G8482 FLU IMMUNIZE ORDER/ADMIN: HCPCS | Performed by: INTERNAL MEDICINE

## 2021-11-30 PROCEDURE — G8417 CALC BMI ABV UP PARAM F/U: HCPCS | Performed by: INTERNAL MEDICINE

## 2021-11-30 RX ORDER — HYDROCODONE BITARTRATE AND ACETAMINOPHEN 5; 325 MG/1; MG/1
1 TABLET ORAL EVERY 6 HOURS PRN
Qty: 28 TABLET | Refills: 0 | Status: SHIPPED | OUTPATIENT
Start: 2021-11-30 | End: 2021-12-07

## 2021-11-30 RX ORDER — METHYLPREDNISOLONE 4 MG/1
TABLET ORAL
Qty: 1 KIT | Refills: 0 | Status: SHIPPED | OUTPATIENT
Start: 2021-11-30 | End: 2021-12-06

## 2021-11-30 ASSESSMENT — ENCOUNTER SYMPTOMS
NAUSEA: 0
RHINORRHEA: 0
TROUBLE SWALLOWING: 0
COLOR CHANGE: 0
SORE THROAT: 0
COUGH: 0
SHORTNESS OF BREATH: 0
EYE DISCHARGE: 0
ANAL BLEEDING: 0
DIARRHEA: 0
CONSTIPATION: 0
EYE ITCHING: 0
ABDOMINAL PAIN: 0
EYE PAIN: 0
FACIAL SWELLING: 0
VOMITING: 0
BLOOD IN STOOL: 0
STRIDOR: 0
WHEEZING: 0
PHOTOPHOBIA: 0

## 2021-11-30 NOTE — PROGRESS NOTES
2021    Name: Ant Ramirez : 1940 Sex: male  Age: 80 y.o. Subjective:  Chief Complaint   Patient presents with    Hip Pain     pain x 1 month in R side        HPI     Patient was seen here on 10/20/2021. Reviewed his encounter for. The day after that he started developing pain on his right hip SI area. It had a massage prior to this that he said his foot started to tingle for a while but that went away. He has not had a massage since then. He said no history of injury to the area. The pain is getting much worse. The pain sometimes radiates around to the front of his abdomen. He has had no lesions suggestive of shingles. He had a virtual visit on 2021 when he was complaining of fatigue, numbness, weakness, chills and cough. He said he could not get warm. He did not have any diaphoresis or shaking chills. His fever was low-grade. He has had his COVID-19 vaccine series but not his booster dose. He has had his flu shot. He has a history of type 2 diabetes mellitus and says his sugars are quite good. He is had problems with his appetite, he just does not have any appetite and is lost about 8 pounds since October. He has early satiety. He denies any bowel problems, no bloating or constipation, he denies any urinary problems. He has a history of prostate cancer, diabetic neuropathy,  CVA, hypertension, asbestosis and pulmonary nodules along with paroxysmal SVT. Previous blood work done at the [place] in 2021. A1c was 7.2% reviewed the rest the results. Because of his history of prostate cancer we need to make sure that he does not have any metastatic disease to his bones. He may need a nuclear medicine bone scan  He last saw his cardiologist Dr. Julia Rosario on 2021. He was pleased with his progress. Review of Systems   Constitutional: Positive for appetite change, fatigue and unexpected weight change.    HENT: Negative for congestion, ear pain, facial swelling, rhinorrhea, sore throat, tinnitus and trouble swallowing. Eyes: Negative for photophobia, pain, discharge, itching and visual disturbance. Respiratory: Negative for cough, shortness of breath, wheezing and stridor. Cardiovascular: Negative for chest pain, palpitations and leg swelling. Gastrointestinal: Negative for abdominal pain, anal bleeding, blood in stool, constipation, diarrhea, nausea and vomiting. Endocrine: Negative for cold intolerance, heat intolerance, polydipsia, polyphagia and polyuria. Genitourinary: Negative for difficulty urinating, dysuria, flank pain, frequency, hematuria and urgency. Musculoskeletal: Positive for gait problem. Negative for arthralgias, joint swelling and myalgias. Right hip and back pain   Skin: Negative for color change, pallor and rash. Allergic/Immunologic: Negative for environmental allergies and food allergies. Neurological: Positive for numbness. Negative for dizziness, tremors, seizures, syncope, speech difficulty, weakness, light-headedness and headaches. Hematological: Negative for adenopathy. Does not bruise/bleed easily. Psychiatric/Behavioral: Negative for agitation, behavioral problems, confusion, sleep disturbance and suicidal ideas. The patient is not nervous/anxious. Current Outpatient Medications:     methylPREDNISolone (MEDROL DOSEPACK) 4 MG tablet, Take by mouth.with food, Disp: 1 kit, Rfl: 0    HYDROcodone-acetaminophen (NORCO) 5-325 MG per tablet, Take 1 tablet by mouth every 6 hours as needed for Pain for up to 7 days. Intended supply: 7 days.  Take lowest dose possible to manage pain, Disp: 28 tablet, Rfl: 0    empagliflozin (JARDIANCE) 25 MG tablet, Empagliflozin (Jardiance) 25 MG Tablet Active 25 MG PO Daily May 17th, 2021 5:49pm, Disp: , Rfl:     cefdinir (OMNICEF) 300 MG capsule, TAKE ONE CAPSULE BY MOUTH EVERY 12 HOURS, Disp: , Rfl:     Semaglutide (OZEMPIC, 1 MG/DOSE, SC), Semaglutide (Ozempic) 1 MG/0.75 ML Pen. Injctr Active May 17th, 2021 5:49pm, Disp: , Rfl:     Cyanocobalamin (VITAMIN B 12) 100 MCG LOZG, Take 500 mcg by mouth, Disp: , Rfl:     Omega-3 Fatty Acids (FISH OIL) 1000 MG CAPS, Take 1,200 mg by mouth, Disp: , Rfl:     brimonidine (ALPHAGAN) 0.2 % ophthalmic solution, INSTILL 1 DROP INTO THE RIGHT EYE TWICE A DAY, Disp: , Rfl:     cycloSPORINE (RESTASIS) 0.05 % ophthalmic emulsion, cycloSPORINE Restasis 0.05 drops 2 times per day OU Active   [facility] ([id number]), Disp: , Rfl:     Magnesium Oxide 420 MG TABS, TAKE ONE TABLET BY MOUTH TWICE A DAY, Disp: , Rfl:     Omega-3 Fatty Acids (FISH OIL) 1200 MG CAPS, Take 1,200 mg by mouth, Disp: , Rfl:     metFORMIN (GLUCOPHAGE) 1000 MG tablet, Take 1,000 mg by mouth daily (with breakfast), Disp: , Rfl:     Rosuvastatin Calcium 20 MG CPSP, Take by mouth daily, Disp: , Rfl:     Misc. Devices (ALL-BODY MASSAGE) MISC, Massage as needed for generalized body pain, Disp: 1 each, Rfl: 5    timolol (TIMOPTIC) 0.5 % ophthalmic solution, , Disp: , Rfl:     HM OMEGA-3-6-9 FATTY ACIDS CAPS, Take 1,200 mg by mouth daily, Disp: , Rfl:     pregabalin (LYRICA) 75 MG capsule, Take 75 mg by mouth 2 times daily. , Disp: , Rfl:     lisinopril (PRINIVIL;ZESTRIL) 10 MG tablet, Take 10 mg by mouth daily, Disp: , Rfl:     clopidogrel (PLAVIX) 75 MG tablet, Take 75 mg by mouth daily, Disp: , Rfl:     vitamin D (CHOLECALCIFEROL) 1000 UNIT TABS tablet, Take 1,000 Units by mouth daily, Disp: , Rfl:     Multiple Vitamins-Minerals (MULTI FOR HIM PO), Take by mouth daily, Disp: , Rfl:     aspirin 81 MG tablet, Take 81 mg by mouth 2 times daily , Disp: , Rfl:     verapamil (CALAN SR) 180 MG extended release tablet, Take 180 mg by mouth nightly, Disp: , Rfl:     insulin glargine (LANTUS) 100 UNIT/ML injection vial, Inject 34 Units into the skin As directed , Disp: , Rfl:      No Known Allergies     Past Medical History:   Diagnosis Date    [facility] [facility]     Gastroparesis     H/O asbestosis     History of paroxysmal supraventricular tachycardia 6/11/2019    Neuropathy     Prostate CA (HCC)     Sleep apnea with use of continuous positive airway pressure (CPAP)     TIA (transient ischemic attack)        Health Maintenance Due   Topic Date Due    Hepatitis C screen  Never done    PSA counseling  Never done    COVID-19 Vaccine (3 - Booster for [place] series) 08/12/2021        Patient Active Problem List   Diagnosis    Essential hypertension    Type 2 diabetes mellitus ([facility]    CVA (cerebral vascular accident) ([facility] [facility]    Neuropathy due to type 2 diabetes mellitus ([facility] [facility]    Sleep apnea with use of continuous positive airway pressure (CPAP)    Mixed hyperlipidemia    H/O asbestosis    Prostate CA ([facility] [facility]    Lumbar and sacral osteoarthritis    History of paroxysmal supraventricular tachycardia    Neuropathy    Need for diphtheria-tetanus-pertussis (Tdap) vaccine    Vertigo    Stage 3 chronic kidney disease (HCC)    Pyelonephritis    Pulmonary nodules    Abnormal x-ray of pelvis    Unsteady gait when walking    Abnormal radionuclide bone scan    History of prostate cancer    Acute viral syndrome    Hip pain, chronic, right    Chronic right sacroiliac pain        Past Surgical History:   Procedure Laterality Date    CERVICAL DISC SURGERY      CHOLECYSTECTOMY      KNEE ARTHROPLASTY      PROSTATE BIOPSY          Family History   Problem Relation Age of Onset    Dementia Mother         Social History     Tobacco Use    Smoking status: Never Smoker    Smokeless tobacco: Never Used   Substance Use Topics    Alcohol use: Not Currently    Drug use: Never        Objective  Vitals:    11/30/21 1524   BP: 130/68   Pulse: 74   Temp: 98.4 °F (36.9 °C)   SpO2: 95%   Weight: 192 lb (87.1 kg)   Height: 5' 8\" (1.727 m)        Exam:  Physical Exam  Vitals reviewed. Exam conducted with a chaperone present.    Constitutional: General: He is not in acute distress. Appearance: Normal appearance. He is well-developed. He is not ill-appearing. Comments: Walks with the aid of a cane. He is unsteady   HENT:      Head: Normocephalic and atraumatic. Right Ear: External ear normal.      Left Ear: External ear normal.      Nose: Nose normal.      Mouth/Throat:      Pharynx: No oropharyngeal exudate. Eyes:      General: No scleral icterus. Right eye: No discharge. Left eye: No discharge. Conjunctiva/sclera: Conjunctivae normal.      Pupils: Pupils are equal, round, and reactive to light. Neck:      Thyroid: No thyromegaly. Cardiovascular:      Rate and Rhythm: Normal rate and regular rhythm. Heart sounds: Normal heart sounds. No murmur heard. No friction rub. No gallop. Pulmonary:      Effort: Pulmonary effort is normal. No respiratory distress. Breath sounds: Normal breath sounds. No wheezing or rales. Chest:      Chest wall: No tenderness. Abdominal:      General: Bowel sounds are normal. There is no distension. Palpations: Abdomen is soft. There is no mass. Tenderness: There is no abdominal tenderness. There is no guarding or rebound. Musculoskeletal:         General: No tenderness or deformity. Normal range of motion. Cervical back: Normal range of motion and neck supple. Comments: Tender to palpation right SI area. He is tender to palpation of his right hip joint. I cannot check range of motion as the patient is unable to lie flat. Lymphadenopathy:      Cervical: No cervical adenopathy. Skin:     General: Skin is warm and dry. Coloration: Skin is not pale. Findings: No erythema or rash. Neurological:      Mental Status: He is alert and oriented to person, place, and time. Cranial Nerves: No cranial nerve deficit. Sensory: No sensory deficit.       Gait: Gait abnormal.      Deep Tendon Reflexes: Reflexes normal.      Comments: Unsteady Psychiatric:         Behavior: Behavior normal.         Thought Content: Thought content normal.         Judgment: Judgment normal.          Last labs reviewed. ASSESSMENT & PLAN :   Problem List        Endocrine    Type 2 diabetes mellitus ([address]       continue present insulin dose. He was told that his blood sugars are going to go up with the steroids but they will come back down. Monitor his blood sugars while on steroids         Relevant Medications    insulin glargine (LANTUS) 100 UNIT/ML injection vial    metFORMIN (GLUCOPHAGE) 1000 MG tablet    empagliflozin (JARDIANCE) 25 MG tablet    Semaglutide (OZEMPIC, 1 MG/DOSE, SC)       Musculoskeletal and Integument    Lumbar and sacral osteoarthritis       x-ray of his lumbosacral spine. Medrol Dosepak. [facility] [place].  [facility] report reviewed and he is on no narcotics. He is on Lyrica which she has been on for a long time. Relevant Medications    aspirin 81 MG tablet    methylPREDNISolone (MEDROL DOSEPACK) 4 MG tablet    HYDROcodone-acetaminophen (NORCO) 5-325 MG per tablet    Other Relevant Orders    Amb External Referral To Orthopedic Surgery       Other    Hip pain, chronic, right - Primary       x-ray of his right hip and referred to Dr. Gonzalo Condon. Uses Norco for pain. Finish up his Medrol Dosepak and see me in 2 weeks.          Relevant Medications    aspirin 81 MG tablet    pregabalin (LYRICA) 75 MG capsule    methylPREDNISolone (MEDROL DOSEPACK) 4 MG tablet    HYDROcodone-acetaminophen (NORCO) 5-325 MG per tablet    Other Relevant Orders    XR HIP RIGHT (2-3 VIEWS)    Amb External Referral To Orthopedic Surgery    Chronic right sacroiliac pain       x-ray of his lumbosacral spine         Relevant Medications    aspirin 81 MG tablet    pregabalin (LYRICA) 75 MG capsule    methylPREDNISolone (MEDROL DOSEPACK) 4 MG tablet    HYDROcodone-acetaminophen (NORCO) 5-325 MG per tablet    Other Relevant Orders    XR LUMBAR SPINE (2-3 VIEWS)    Amb

## 2021-11-30 NOTE — ASSESSMENT & PLAN NOTE
x-ray of his right hip and referred to Dr. Danial Briones. Uses Norco for pain. Finish up his Medrol Dosepak and see me in 2 weeks.

## 2021-11-30 NOTE — ASSESSMENT & PLAN NOTE
x-ray of his lumbosacral spine. Medrol Dosepak. [facility] [place].  O[facility] report reviewed and he is on no narcotics. He is on Lyrica which she has been on for a long time.

## 2021-12-01 DIAGNOSIS — M25.551 HIP PAIN, RIGHT: ICD-10-CM

## 2021-12-01 DIAGNOSIS — C61 PROSTATE CANCER (HCC): ICD-10-CM

## 2021-12-01 DIAGNOSIS — M54.50 LUMBAR PAIN: Primary | ICD-10-CM

## 2021-12-14 ENCOUNTER — OFFICE VISIT (OUTPATIENT)
Dept: PRIMARY CARE CLINIC | Age: 81
End: 2021-12-14
Payer: MEDICARE

## 2021-12-14 VITALS
DIASTOLIC BLOOD PRESSURE: 76 MMHG | BODY MASS INDEX: 28.95 KG/M2 | HEIGHT: 68 IN | SYSTOLIC BLOOD PRESSURE: 122 MMHG | TEMPERATURE: 97 F | WEIGHT: 191 LBS | HEART RATE: 77 BPM

## 2021-12-14 DIAGNOSIS — C61 PROSTATE CA (HCC): ICD-10-CM

## 2021-12-14 DIAGNOSIS — I10 ESSENTIAL HYPERTENSION: Primary | ICD-10-CM

## 2021-12-14 DIAGNOSIS — Z79.4 TYPE 2 DIABETES MELLITUS WITH DIABETIC NEUROPATHY, WITH LONG-TERM CURRENT USE OF INSULIN (HCC): ICD-10-CM

## 2021-12-14 DIAGNOSIS — I10 ESSENTIAL HYPERTENSION: ICD-10-CM

## 2021-12-14 DIAGNOSIS — E11.40 TYPE 2 DIABETES MELLITUS WITH DIABETIC NEUROPATHY, WITH LONG-TERM CURRENT USE OF INSULIN (HCC): ICD-10-CM

## 2021-12-14 DIAGNOSIS — M25.551 HIP PAIN, CHRONIC, RIGHT: ICD-10-CM

## 2021-12-14 DIAGNOSIS — M47.817 LUMBAR AND SACRAL OSTEOARTHRITIS: ICD-10-CM

## 2021-12-14 DIAGNOSIS — G89.29 HIP PAIN, CHRONIC, RIGHT: ICD-10-CM

## 2021-12-14 DIAGNOSIS — G89.29 CHRONIC RIGHT SACROILIAC PAIN: ICD-10-CM

## 2021-12-14 DIAGNOSIS — M53.3 CHRONIC RIGHT SACROILIAC PAIN: ICD-10-CM

## 2021-12-14 LAB
ALBUMIN SERPL-MCNC: 3.8 G/DL (ref 3.5–5.2)
ALP BLD-CCNC: 79 U/L (ref 40–129)
ALT SERPL-CCNC: 40 U/L (ref 0–40)
ANION GAP SERPL CALCULATED.3IONS-SCNC: 12 MMOL/L (ref 7–16)
AST SERPL-CCNC: 41 U/L (ref 0–39)
BASOPHILS ABSOLUTE: 0.04 E9/L (ref 0–0.2)
BASOPHILS RELATIVE PERCENT: 0.8 % (ref 0–2)
BILIRUB SERPL-MCNC: 1.1 MG/DL (ref 0–1.2)
BUN BLDV-MCNC: 25 MG/DL (ref 6–23)
CALCIUM SERPL-MCNC: 9.6 MG/DL (ref 8.6–10.2)
CHLORIDE BLD-SCNC: 100 MMOL/L (ref 98–107)
CO2: 26 MMOL/L (ref 22–29)
CREAT SERPL-MCNC: 1.3 MG/DL (ref 0.7–1.2)
EOSINOPHILS ABSOLUTE: 0.22 E9/L (ref 0.05–0.5)
EOSINOPHILS RELATIVE PERCENT: 4.3 % (ref 0–6)
GFR AFRICAN AMERICAN: >60
GFR NON-AFRICAN AMERICAN: 53 ML/MIN/1.73
GLUCOSE BLD-MCNC: 99 MG/DL (ref 74–99)
HBA1C MFR BLD: 6.8 % (ref 4–5.6)
HCT VFR BLD CALC: 44 % (ref 37–54)
HEMOGLOBIN: 14.1 G/DL (ref 12.5–16.5)
IMMATURE GRANULOCYTES #: 0.03 E9/L
IMMATURE GRANULOCYTES %: 0.6 % (ref 0–5)
LYMPHOCYTES ABSOLUTE: 1.06 E9/L (ref 1.5–4)
LYMPHOCYTES RELATIVE PERCENT: 20.5 % (ref 20–42)
MCH RBC QN AUTO: 30.6 PG (ref 26–35)
MCHC RBC AUTO-ENTMCNC: 32 % (ref 32–34.5)
MCV RBC AUTO: 95.4 FL (ref 80–99.9)
MONOCYTES ABSOLUTE: 0.7 E9/L (ref 0.1–0.95)
MONOCYTES RELATIVE PERCENT: 13.6 % (ref 2–12)
NEUTROPHILS ABSOLUTE: 3.11 E9/L (ref 1.8–7.3)
NEUTROPHILS RELATIVE PERCENT: 60.2 % (ref 43–80)
PDW BLD-RTO: 15.3 FL (ref 11.5–15)
PLATELET # BLD: 216 E9/L (ref 130–450)
PMV BLD AUTO: 10.3 FL (ref 7–12)
POTASSIUM SERPL-SCNC: 4.9 MMOL/L (ref 3.5–5)
RBC # BLD: 4.61 E12/L (ref 3.8–5.8)
SEDIMENTATION RATE, ERYTHROCYTE: 30 MM/HR (ref 0–15)
SODIUM BLD-SCNC: 138 MMOL/L (ref 132–146)
TOTAL PROTEIN: 6.7 G/DL (ref 6.4–8.3)
WBC # BLD: 5.2 E9/L (ref 4.5–11.5)

## 2021-12-14 PROCEDURE — G8482 FLU IMMUNIZE ORDER/ADMIN: HCPCS | Performed by: INTERNAL MEDICINE

## 2021-12-14 PROCEDURE — 1036F TOBACCO NON-USER: CPT | Performed by: INTERNAL MEDICINE

## 2021-12-14 PROCEDURE — 1123F ACP DISCUSS/DSCN MKR DOCD: CPT | Performed by: INTERNAL MEDICINE

## 2021-12-14 PROCEDURE — 99213 OFFICE O/P EST LOW 20 MIN: CPT | Performed by: INTERNAL MEDICINE

## 2021-12-14 PROCEDURE — 4040F PNEUMOC VAC/ADMIN/RCVD: CPT | Performed by: INTERNAL MEDICINE

## 2021-12-14 PROCEDURE — G8427 DOCREV CUR MEDS BY ELIG CLIN: HCPCS | Performed by: INTERNAL MEDICINE

## 2021-12-14 PROCEDURE — G8417 CALC BMI ABV UP PARAM F/U: HCPCS | Performed by: INTERNAL MEDICINE

## 2021-12-14 ASSESSMENT — ENCOUNTER SYMPTOMS
WHEEZING: 0
EYE DISCHARGE: 0
CONSTIPATION: 0
EYE PAIN: 0
ANAL BLEEDING: 0
TROUBLE SWALLOWING: 0
DIARRHEA: 0
VOMITING: 0
FACIAL SWELLING: 0
BLOOD IN STOOL: 0
ABDOMINAL PAIN: 0
COUGH: 0
SORE THROAT: 0
NAUSEA: 0
PHOTOPHOBIA: 0
RHINORRHEA: 0
STRIDOR: 0
EYE ITCHING: 0
COLOR CHANGE: 0
SHORTNESS OF BREATH: 0

## 2021-12-14 NOTE — PROGRESS NOTES
shaking chills. His fever was low-grade. He has had his COVID-19 vaccine series but not his booster dose. He has had his flu shot. He has a history of type 2 diabetes mellitus and says his sugars are quite good. He is had problems with his appetite, he just does not have any appetite and is lost about 8 pounds since October. He has early satiety. He denies any bowel problems, no bloating or constipation, he denies any urinary problems. He has a history of prostate cancer, diabetic neuropathy,  CVA, hypertension, asbestosis and pulmonary nodules along with paroxysmal SVT. Previous blood work done at the [facility] in September 2021. A1c was 7.2% reviewed the rest the results. He may need a nuclear medicine bone scan  He last saw his cardiologist Dr. Aurelia Lucero on October 19, 2021. He was pleased with his progress. Review of Systems   Constitutional: Positive for appetite change, fatigue and unexpected weight change. HENT: Negative for congestion, ear pain, facial swelling, rhinorrhea, sore throat, tinnitus and trouble swallowing. Eyes: Negative for photophobia, pain, discharge, itching and visual disturbance. Respiratory: Negative for cough, shortness of breath, wheezing and stridor. Cardiovascular: Negative for chest pain, palpitations and leg swelling. Gastrointestinal: Negative for abdominal pain, anal bleeding, blood in stool, constipation, diarrhea, nausea and vomiting. Endocrine: Negative for cold intolerance, heat intolerance, polydipsia, polyphagia and polyuria. Genitourinary: Negative for difficulty urinating, dysuria, flank pain, frequency, hematuria and urgency. Musculoskeletal: Positive for gait problem. Negative for arthralgias, joint swelling and myalgias. Right hip and back pain   Skin: Negative for color change, pallor and rash. Allergic/Immunologic: Negative for environmental allergies and food allergies. Neurological: Positive for numbness.  Negative for dizziness, tremors, seizures, syncope, speech difficulty, weakness, light-headedness and headaches. Hematological: Negative for adenopathy. Does not bruise/bleed easily. Psychiatric/Behavioral: Negative for agitation, behavioral problems, confusion, sleep disturbance and suicidal ideas. The patient is not nervous/anxious. Current Outpatient Medications:     empagliflozin (JARDIANCE) 25 MG tablet, Empagliflozin (Jardiance) 25 MG Tablet Active 25 MG PO Daily May 17th, 2021 5:49pm, Disp: , Rfl:     cefdinir (OMNICEF) 300 MG capsule, TAKE ONE CAPSULE BY MOUTH EVERY 12 HOURS, Disp: , Rfl:     Semaglutide (OZEMPIC, 1 MG/DOSE, SC), Semaglutide (Ozempic) 1 MG/0.75 ML Pen. Injctr Active May 17th, 2021 5:49pm, Disp: , Rfl:     Cyanocobalamin (VITAMIN B 12) 100 MCG LOZG, Take 500 mcg by mouth, Disp: , Rfl:     Omega-3 Fatty Acids (FISH OIL) 1000 MG CAPS, Take 1,200 mg by mouth, Disp: , Rfl:     brimonidine (ALPHAGAN) 0.2 % ophthalmic solution, INSTILL 1 DROP INTO THE RIGHT EYE TWICE A DAY, Disp: , Rfl:     cycloSPORINE (RESTASIS) 0.05 % ophthalmic emulsion, cycloSPORINE Restasis 0.05 drops 2 times per day OU Active   [facility] ([id number]), Disp: , Rfl:     Magnesium Oxide 420 MG TABS, TAKE ONE TABLET BY MOUTH TWICE A DAY, Disp: , Rfl:     Omega-3 Fatty Acids (FISH OIL) 1200 MG CAPS, Take 1,200 mg by mouth, Disp: , Rfl:     metFORMIN (GLUCOPHAGE) 1000 MG tablet, Take 1,000 mg by mouth daily (with breakfast), Disp: , Rfl:     Rosuvastatin Calcium 20 MG CPSP, Take by mouth daily, Disp: , Rfl:     Misc. Devices (ALL-BODY MASSAGE) MISC, Massage as needed for generalized body pain, Disp: 1 each, Rfl: 5    timolol (TIMOPTIC) 0.5 % ophthalmic solution, , Disp: , Rfl:     HM OMEGA-3-6-9 FATTY ACIDS CAPS, Take 1,200 mg by mouth daily, Disp: , Rfl:     pregabalin (LYRICA) 75 MG capsule, Take 75 mg by mouth 2 times daily. , Disp: , Rfl:     lisinopril (PRINIVIL;ZESTRIL) 10 MG tablet, Take 10 mg by mouth daily, Disp: , Rfl:     clopidogrel (PLAVIX) 75 MG tablet, Take 75 mg by mouth daily, Disp: , Rfl:     vitamin D (CHOLECALCIFEROL) 1000 UNIT TABS tablet, Take 1,000 Units by mouth daily, Disp: , Rfl:     Multiple Vitamins-Minerals (MULTI FOR HIM PO), Take by mouth daily, Disp: , Rfl:     aspirin 81 MG tablet, Take 81 mg by mouth 2 times daily , Disp: , Rfl:     verapamil (CALAN SR) 180 MG extended release tablet, Take 180 mg by mouth nightly, Disp: , Rfl:     insulin glargine (LANTUS) 100 UNIT/ML injection vial, Inject 34 Units into the skin As directed , Disp: , Rfl:      No Known Allergies     Past Medical History:   Diagnosis Date    [facility]     Gastroparesis     H/O asbestosis     History of paroxysmal supraventricular tachycardia 6/11/2019    Neuropathy     Prostate CA (HCC)     Sleep apnea with use of continuous positive airway pressure (CPAP)     TIA (transient ischemic attack)        Health Maintenance Due   Topic Date Due    Hepatitis C screen  Never done    PSA counseling  Never done   [employer] Visit (AWV)  12/30/2021        Patient Active Problem List   Diagnosis    Essential hypertension    Type 2 diabetes mellitus ([facility]    CVA [facility]cerebral vascular accident) ([facility]    Neuropathy due to type 2 diabetes mellitus ([address]    Sleep apnea with use of continuous positive airway pressure (CPAP)    Mixed hyperlipidemia    H/O asbestosis    Prostate CA ([address]    Lumbar and sacral osteoarthritis    History of paroxysmal supraventricular tachycardia    Neuropathy    Need for diphtheria-tetanus-pertussis (Tdap) vaccine    Vertigo    Stage 3 chronic kidney disease (HCC)    Pyelonephritis    Pulmonary nodules    Abnormal x-ray of pelvis    Unsteady gait when walking    Abnormal radionuclide bone scan    History of prostate cancer    Acute viral syndrome    Hip pain, chronic, right    Chronic right sacroiliac pain        Past Surgical range of motion and neck supple. Comments: Tender to palpation right SI area. He is tender to palpation of his right hip joint. I cannot check range of motion as the patient is unable to lie flat. Lymphadenopathy:      Cervical: No cervical adenopathy. Skin:     General: Skin is warm and dry. Coloration: Skin is not pale. Findings: No erythema or rash. Neurological:      Mental Status: He is alert and oriented to person, place, and time. Cranial Nerves: No cranial nerve deficit. Sensory: No sensory deficit. Gait: Gait abnormal.      Deep Tendon Reflexes: Reflexes normal.      Comments: Unsteady   Psychiatric:         Behavior: Behavior normal.         Thought Content: Thought content normal.         Judgment: Judgment normal.          Last labs reviewed. ASSESSMENT & PLAN :   Problem List        Circulatory    Essential hypertension - Primary     Blood pressures are stable. Continue medications and monitor blood pressures at home. Call office if systolics are over 397 over diastolics over 90. Relevant Orders    Comprehensive Metabolic Panel (Completed)       Endocrine    Type 2 diabetes mellitus ([facility]       watch diet ,check fasting blood sugars and hemoglobin A1c. Continue same medications         Relevant Medications    insulin glargine (LANTUS) 100 UNIT/ML injection vial    metFORMIN (GLUCOPHAGE) 1000 MG tablet    empagliflozin (JARDIANCE) 25 MG tablet    Semaglutide (OZEMPIC, 1 MG/DOSE, SC)    Other Relevant Orders    CBC Auto Differential (Completed)    Hemoglobin A1C (Completed)       Musculoskeletal and Integument    Lumbar and sacral osteoarthritis       discussed returning to Dr. Anastasia Rae at [facility] clinic and he wants to defer at this time. He was wondering about orthopedic surgeons to deal with back surgery at [facility]. That would be an option.   In the meantime if his nuclear bone scan  does not show any metastatic lesions we can send him to physical therapy    Check CBC, CMP and sed rate         Relevant Medications    aspirin 81 MG tablet    Other Relevant Orders    Sedimentation Rate (Completed)       Genitourinary    Prostate CA ([address]       nuclear bone scan to rule out metastatic disease. Relevant Medications    aspirin 81 MG tablet    pregabalin (LYRICA) 75 MG capsule       Other    Hip pain, chronic, right       continue Norco, OARRS report reviewed and his first fill was 11/30/2021 of 28 pills. At this time he does not need a refill but he may need one next week         Relevant Medications    aspirin 81 MG tablet    pregabalin (LYRICA) 75 MG capsule    Chronic right sacroiliac pain       consider sending back to Dr. Zakiya Herrera at [facility]. Cannot do MRI because of hardware. Consider CT scan         Relevant Medications    aspirin 81 MG tablet    pregabalin (LYRICA) 75 MG capsule           Return in about 5 weeks (around 1/19/2022).        Kirt Gamez, DO  12/15/2021

## 2021-12-15 NOTE — ASSESSMENT & PLAN NOTE
continue Shaneka Bacon report reviewed and his first fill was 11/30/2021 of 28 pills.   At this time he does not need a refill but he may need one next week

## 2021-12-15 NOTE — ASSESSMENT & PLAN NOTE
discussed returning to Dr. Jessica Ayala at [facility] and he wants to defer at this time. He was wondering about orthopedic surgeons to deal with back surgery at [facility]. That would be an option.   In the meantime if his nuclear bone scan  does not show any metastatic lesions we can send him to physical therapy    Check CBC, CMP and sed rate

## 2021-12-15 NOTE — ASSESSMENT & PLAN NOTE
consider sending back to Dr. Mónica Solis at [address] [place] [place] [address]. Cannot do MRI because of hardware.   Consider CT scan

## 2021-12-28 ENCOUNTER — TELEPHONE (OUTPATIENT)
Dept: PRIMARY CARE CLINIC | Age: 81
End: 2021-12-28

## 2021-12-28 NOTE — TELEPHONE ENCOUNTER
We can refer him to physical therapy if he is willing to do that first before returning to an orthopedic surgeon

## 2022-01-19 ENCOUNTER — OFFICE VISIT (OUTPATIENT)
Dept: PRIMARY CARE CLINIC | Age: 82
End: 2022-01-19
Payer: MEDICARE

## 2022-01-19 ENCOUNTER — TELEPHONE (OUTPATIENT)
Dept: PRIMARY CARE CLINIC | Age: 82
End: 2022-01-19

## 2022-01-19 VITALS
DIASTOLIC BLOOD PRESSURE: 62 MMHG | HEIGHT: 68 IN | WEIGHT: 190 LBS | HEART RATE: 65 BPM | TEMPERATURE: 97.4 F | BODY MASS INDEX: 28.79 KG/M2 | SYSTOLIC BLOOD PRESSURE: 110 MMHG | OXYGEN SATURATION: 95 %

## 2022-01-19 VITALS
DIASTOLIC BLOOD PRESSURE: 62 MMHG | TEMPERATURE: 97.4 F | OXYGEN SATURATION: 95 % | HEART RATE: 65 BPM | HEIGHT: 68 IN | SYSTOLIC BLOOD PRESSURE: 110 MMHG | BODY MASS INDEX: 28.79 KG/M2 | WEIGHT: 190 LBS

## 2022-01-19 DIAGNOSIS — C61 PROSTATE CA (HCC): ICD-10-CM

## 2022-01-19 DIAGNOSIS — E78.2 MIXED HYPERLIPIDEMIA: Primary | ICD-10-CM

## 2022-01-19 DIAGNOSIS — Z86.73 HISTORY OF CVA (CEREBROVASCULAR ACCIDENT): ICD-10-CM

## 2022-01-19 DIAGNOSIS — Z00.00 ROUTINE GENERAL MEDICAL EXAMINATION AT A HEALTH CARE FACILITY: ICD-10-CM

## 2022-01-19 DIAGNOSIS — Z79.4 TYPE 2 DIABETES MELLITUS WITH DIABETIC NEUROPATHY, WITH LONG-TERM CURRENT USE OF INSULIN (HCC): ICD-10-CM

## 2022-01-19 DIAGNOSIS — E11.40 TYPE 2 DIABETES MELLITUS WITH DIABETIC NEUROPATHY, WITH LONG-TERM CURRENT USE OF INSULIN (HCC): ICD-10-CM

## 2022-01-19 DIAGNOSIS — N18.31 STAGE 3A CHRONIC KIDNEY DISEASE (HCC): ICD-10-CM

## 2022-01-19 PROCEDURE — 1123F ACP DISCUSS/DSCN MKR DOCD: CPT | Performed by: INTERNAL MEDICINE

## 2022-01-19 PROCEDURE — G0439 PPPS, SUBSEQ VISIT: HCPCS | Performed by: INTERNAL MEDICINE

## 2022-01-19 PROCEDURE — G8417 CALC BMI ABV UP PARAM F/U: HCPCS | Performed by: INTERNAL MEDICINE

## 2022-01-19 PROCEDURE — 99213 OFFICE O/P EST LOW 20 MIN: CPT | Performed by: INTERNAL MEDICINE

## 2022-01-19 PROCEDURE — G8482 FLU IMMUNIZE ORDER/ADMIN: HCPCS | Performed by: INTERNAL MEDICINE

## 2022-01-19 PROCEDURE — 4040F PNEUMOC VAC/ADMIN/RCVD: CPT | Performed by: INTERNAL MEDICINE

## 2022-01-19 PROCEDURE — G8427 DOCREV CUR MEDS BY ELIG CLIN: HCPCS | Performed by: INTERNAL MEDICINE

## 2022-01-19 PROCEDURE — 1036F TOBACCO NON-USER: CPT | Performed by: INTERNAL MEDICINE

## 2022-01-19 ASSESSMENT — ENCOUNTER SYMPTOMS
VOMITING: 0
COLOR CHANGE: 0
SORE THROAT: 0
ABDOMINAL PAIN: 0
EYE DISCHARGE: 0
WHEEZING: 0
ANAL BLEEDING: 0
SHORTNESS OF BREATH: 0
EYE ITCHING: 0
BLOOD IN STOOL: 0
COUGH: 0
CONSTIPATION: 0
EYE PAIN: 0
DIARRHEA: 0
PHOTOPHOBIA: 0
TROUBLE SWALLOWING: 0
STRIDOR: 0
RHINORRHEA: 0
FACIAL SWELLING: 0
NAUSEA: 0

## 2022-01-19 ASSESSMENT — PATIENT HEALTH QUESTIONNAIRE - PHQ9
2. FEELING DOWN, DEPRESSED OR HOPELESS: 0
SUM OF ALL RESPONSES TO PHQ QUESTIONS 1-9: 0
SUM OF ALL RESPONSES TO PHQ QUESTIONS 1-9: 0
1. LITTLE INTEREST OR PLEASURE IN DOING THINGS: 0
SUM OF ALL RESPONSES TO PHQ QUESTIONS 1-9: 0
SUM OF ALL RESPONSES TO PHQ QUESTIONS 1-9: 0
SUM OF ALL RESPONSES TO PHQ9 QUESTIONS 1 & 2: 0

## 2022-01-19 ASSESSMENT — LIFESTYLE VARIABLES: HOW OFTEN DO YOU HAVE A DRINK CONTAINING ALCOHOL: 0

## 2022-01-19 NOTE — PROGRESS NOTES
Medicare Annual Wellness Visit  Name: German Bingham Date: 2022   MRN: <[id number]> Sex: Male   Age: 80 y.o. Ethnicity: Non- / Non    : 1940 Race: White (non-)      Selin Burgos is here for Medicare AWV    Screenings for behavioral, psychosocial and functional/safety risks, and cognitive dysfunction are all negative except as indicated below. These results, as well as other patient data from the [address] [facility] [address] form, are documented in Flowsheets linked to this Encounter. No Known Allergies    Prior to Visit Medications    Medication Sig Taking? Authorizing Provider   empagliflozin (JARDIANCE) 25 MG tablet Empagliflozin (Jardiance) 25 MG Tablet Active 25 MG PO Daily May 17th, 2021 5:49pm Yes Historical Provider, MD   cefdinir (OMNICEF) 300 MG capsule TAKE ONE CAPSULE BY MOUTH EVERY 12 HOURS Yes Historical Provider, MD   Semaglutide (OZEMPIC, 1 MG/DOSE, SC) 0.05 mg  Yes Historical Provider, MD   Cyanocobalamin (VITAMIN B 12) 100 MCG LOZG Take 500 mcg by mouth Yes Historical Provider, MD   Omega-3 Fatty Acids (FISH OIL) 1000 MG CAPS Take 1,200 mg by mouth Yes Historical Provider, MD   brimonidine (ALPHAGAN) 0.2 % ophthalmic solution INSTILL 1 DROP INTO THE RIGHT EYE TWICE A DAY Yes Historical Provider, MD   cycloSPORINE (RESTASIS) 0.05 % ophthalmic emulsion cycloSPORINE Restasis 0.05 drops 2 times per day [facility] ([id number]) Yes Historical Provider, MD   Magnesium Oxide 420 MG TABS TAKE ONE TABLET BY MOUTH TWICE A DAY Yes Historical Provider, MD   Omega-3 Fatty Acids (FISH OIL) 1200 MG CAPS Take 1,200 mg by mouth Yes Historical Provider, MD   metFORMIN (GLUCOPHAGE) 1000 MG tablet Take 1,000 mg by mouth daily (with breakfast) Yes Historical Provider, MD   Rosuvastatin Calcium 20 MG CPSP Take by mouth daily Yes Historical Provider, MD   Misc.  Devices (ALL-BODY MASSAGE) MISC Massage as needed for generalized body pain Yes Dionne DO Samantha   timolol (TIMOPTIC) 0.5 % ophthalmic solution  Yes Historical Provider, MD CAN OMEGA-3-6-9 FATTY ACIDS CAPS Take 1,200 mg by mouth daily Yes Historical Provider, MD   pregabalin (LYRICA) 75 MG capsule Take 75 mg by mouth 2 times daily.  Yes Historical Provider, MD   lisinopril (PRINIVIL;ZESTRIL) 10 MG tablet Take 10 mg by mouth daily Yes Historical Provider, MD   clopidogrel (PLAVIX) 75 MG tablet Take 75 mg by mouth daily Yes Historical Provider, MD   vitamin D (CHOLECALCIFEROL) 1000 UNIT TABS tablet Take 1,000 Units by mouth daily Yes Historical Provider, MD   Multiple Vitamins-Minerals (MULTI FOR HIM PO) Take by mouth daily Yes Historical Provider, MD   aspirin 81 MG tablet Take 81 mg by mouth 2 times daily  Yes Historical Provider, MD   verapamil (CALAN SR) 180 MG extended release tablet Take 180 mg by mouth nightly Yes Historical Provider, MD   insulin glargine (LANTUS) 100 UNIT/ML injection vial Inject 34 Units into the skin As directed  Yes Historical Provider, MD       Past Medical History:   Diagnosis Date    [facility]) ([facility]     Gastroparesis     H/O asbestosis     History of paroxysmal supraventricular tachycardia 6/11/2019    Neuropathy     Prostate CA ([facility]     Sleep apnea with use of continuous positive airway pressure (CPAP)     TIA (transient ischemic attack)        Past Surgical History:   Procedure Laterality Date    CERVICAL DISC SURGERY      CHOLECYSTECTOMY      KNEE ARTHROPLASTY      PROSTATE BIOPSY         Family History   Problem Relation Age of Onset    Dementia Mother        CareTeam (Including outside providers/suppliers regularly involved in providing care):   Patient Care Team:  Devon Longoria DO as PCP - General (Internal Medicine)  Devon Longoria DO as PCP - [facility] Empaneled Provider    Wt Readings from Last 3 Encounters:   01/19/22 190 lb (86.2 kg)   01/19/22 190 lb (86.2 kg)   12/14/21 191 lb (86.6 kg)     Vitals:    01/19/22 1306   BP: 110/62   Pulse: 65   Temp: 97.4 °F (36.3 °C)   SpO2: 95%   Weight: 190 lb (86.2 kg)   Height: 5' 8\" (1.727 m)     Body mass index is 28.89 kg/m². Based upon direct observation of the patient, evaluation of cognition reveals recent and remote memory intact. Patient's complete Health Risk Assessment and screening values have been reviewed and are found in Flowsheets. The following problems were reviewed today and where indicated follow up appointments were made and/or referrals ordered. Positive Risk Factor Screenings with Interventions:            General Health and ACP:  General  In general, how would you say your health is?: Good  In the past 7 days, have you experienced any of the following?  New or Increased Pain, New or Increased Fatigue, Loneliness, Social Isolation, Stress or Anger?: None of These  Do you get the social and emotional support that you need?: Yes  Do you have a Living Will?: Yes  Advance Directives     Power of [address] [place] [address] Will ACP-Advance Directive ACP-Power of     Not on File Not on File Not on File Not on File      General Health Risk Interventions:  · No Living Will: ACP documents already completed- patient asked to provide copy to the office    Health Habits/Nutrition:  Health Habits/Nutrition  Do you exercise for at least 20 minutes 2-3 times per week?: (!) No  Have you lost any weight without trying in the past 3 months?: No  Do you eat only one meal per day?: No  Have you seen the dentist within the past year?: Yes  Body mass index: (!) 28.89  Health Habits/Nutrition Interventions:  · Inadequate physical activity:  patient is not ready to increase his/her physical activity level at this time    Hearing/Vision:  No exam data present  Hearing/Vision  Do you or your family notice any trouble with your hearing that hasn't been managed with hearing aids?: (!) Yes  Do you have difficulty driving, watching TV, or doing any of your daily activities because of your eyesight?: No  Have you had an eye exam within the past year?: Yes  Hearing/Vision Interventions:  · Hearing concerns:  sees Hearing doctor at [facility]:  Safety  Do you have working smoke detectors?: Yes  Have all throw rugs been removed or fastened?: (!) No  Do you have non-slip mats or surfaces in all bathtubs/showers?: Yes  Do all of your stairways have a railing or banister?: Yes  Are your doorways, halls and stairs free of clutter?: Yes  Do you always fasten your seatbelt when you are in a car?: Yes  Safety Interventions:  · Home safety tips provided       Personalized Preventive Plan   Current Health Maintenance Status  Immunization History   Administered Date(s) Administered    COVID-19, Pfizer Purple top, DILUTE for use, 12+ yrs, 30mcg/0.3mL dose 01/25/2021, 02/12/2021, 12/11/2021    DTaP, 5 Pertussis Antigens (Daptacel) 09/13/2021    Influenza Vaccine, unspecified formulation 12/05/2017    Influenza Virus Vaccine 10/14/2008, 09/24/2009, 10/01/2010, 10/01/2011, 09/01/2013, 09/20/2015, 10/01/2016, 11/01/2018    Influenza, High Dose (Fluzone 65 yrs and older) 10/23/2018, 10/29/2020, 10/18/2021    Influenza, High-dose, Oscar Viveros, 65 yrs +, IM (Fluzone) 10/29/2020    Influenza, Quadv, IM, PF (6 mo and older Fluzone, Flulaval, Fluarix, and 3 yrs and older Afluria) 04/09/2019    Influenza, Quadv, adjuvanted, 65 yrs +, IM, PF (Fluad) 10/18/2021    Influenza, Triv, inactivated, subunit, adjuvanted, IM (Fluad 65 yrs and older) 09/12/2019    Pneumococcal Conjugate 13-valent ([id number]) 05/13/2013    Pneumococcal Polysaccharide ([id number]) 05/13/2014, 09/13/2021    Td (Adult), 5 Lf Tetanus Toxoid, Pf (Tenivac, Decavac) 09/13/2021    Td vaccine (adult) 08/21/2002    Zoster Recombinant (Shingrix) 03/13/2018, 05/21/2018        Health Maintenance   Topic Date Due    Hepatitis C screen  Never done    Lipid screen  10/20/2022 (Originally 9/8/2022)    Depression Screen  03/16/2022    Potassium monitoring  12/14/2022    Creatinine monitoring  12/14/2022    PSA counseling  01/10/2023    DTaP/Tdap/Td vaccine (2 - Tdap) 09/13/2031    Flu vaccine  Completed    Shingles Vaccine  Completed    Pneumococcal 65+ years Vaccine  Completed    COVID-19 Vaccine  Completed    Hepatitis A vaccine  Aged Out    Hib vaccine  Aged Out    Meningococcal (ACWY) vaccine  Aged Out     Recommendations for ABA English Due: see orders and patient instructions/AVS.  . Recommended screening schedule for the next 5-10 years is provided to the patient in written form: see Patient Instructions/AVS.    There are no diagnoses linked to this encounter. Cardiovascular Disease Risk Counseling: Assessed the patient's risk to develop cardiovascular disease and reviewed main risk factors. Reviewed steps to reduce disease risk including:   · Quitting tobacco use, reducing amount smoked, or not starting the habit  · Making healthy food choices  · Being physically active and gradualy increasing activity levels   · Reduce weight and determine a healthy BMI goal  · Monitor blood pressure and treat if higher than 140/90 mmHg  · Maintain blood total cholesterol levels under 5 mmol/l or 190 mg/dl  · Maintain LDL cholesterol levels under 3.0 mmol/l or 115 mg/dl   · Control blood glucose levels  · Consider taking aspirin (75 mg daily), once blood pressure is controlled   Provided a follow up plan.   Time spent (minutes): 5

## 2022-01-19 NOTE — ASSESSMENT & PLAN NOTE
serum creatinine is 1.3 with an estimated GFR 53. He will avoid NSAIDs and will continue to monitor.

## 2022-01-19 NOTE — PROGRESS NOTES
2022    Name: Cheng Kapadia : 1940 Sex: male  Age: 80 y.o. Subjective:  Chief Complaint   Patient presents with    Hypertension     3 month        Hypertension  Pertinent negatives include no chest pain, headaches, palpitations or shortness of breath. His back and hip pain is much better. He responded well to as Medrol Dosepak. Interestingly his PCP at the [facility] had been increasing his Ozempic and when she heard of his hip pain she decreased it and now he is down to 0.5 mg a day and he says his pain is much more tolerable. .  . He is also on Norco which helps. He has no side effects of constipation or hypersomnolence with the narcotic. He has only taken about 6 of them and he has quite a few left. .  He had a nuclear medicine bone scan because of his history of prostate CA we wanted to make sure that he did not have any metastatic lesions. He did not have any metastatic lesions. There was no loosening of his hardware. He will be following up with Dr. Lucian Richardson in the near future. He had a virtual visit on 2021 when he was complaining of fatigue, numbness, weakness, chills and cough. He said he could not get warm. He did not have any diaphoresis or shaking chills. His fever was low-grade. He has had his COVID-19 vaccine series. He had his booster dose. He  had his flu shot. He has a history of type 2 diabetes mellitus and says his sugars are quite good. He is had problems with his appetite, he just does not have any appetite and is lost about 8 pounds since October. He has early satiety. He denies any bowel problems, no bloating or constipation, he denies any urinary problems. His weight has been stable for the last couple of months. He has a history of prostate cancer, diabetic neuropathy,  CVA, hypertension, asbestosis and pulmonary nodules along with paroxysmal SVT.     He will be seeing his urologist and will send copies of his PSA and bone scan to him.    He last saw his cardiologist Dr. Rena Harris on October 19, 2021. He was pleased with his progress. Review of Systems   Constitutional: Negative for appetite change, fatigue and unexpected weight change. HENT: Negative for congestion, ear pain, facial swelling, rhinorrhea, sore throat, tinnitus and trouble swallowing. Eyes: Negative for photophobia, pain, discharge, itching and visual disturbance. Respiratory: Negative for cough, shortness of breath, wheezing and stridor. Cardiovascular: Negative for chest pain, palpitations and leg swelling. Gastrointestinal: Negative for abdominal pain, anal bleeding, blood in stool, constipation, diarrhea, nausea and vomiting. Endocrine: Negative for cold intolerance, heat intolerance, polydipsia, polyphagia and polyuria. Genitourinary: Negative for difficulty urinating, dysuria, flank pain, frequency, hematuria and urgency. Musculoskeletal: Positive for gait problem. Negative for arthralgias, joint swelling and myalgias. Skin: Negative for color change, pallor and rash. Allergic/Immunologic: Negative for environmental allergies and food allergies. Neurological: Positive for numbness. Negative for dizziness, tremors, seizures, syncope, speech difficulty, weakness, light-headedness and headaches. Hematological: Negative for adenopathy. Does not bruise/bleed easily. Psychiatric/Behavioral: Negative for agitation, behavioral problems, confusion, sleep disturbance and suicidal ideas. The patient is not nervous/anxious.            Current Outpatient Medications:     empagliflozin (JARDIANCE) 25 MG tablet, Empagliflozin (Jardiance) 25 MG Tablet Active 25 MG PO Daily May 17th, 2021 5:49pm, Disp: , Rfl:     Semaglutide (OZEMPIC, 1 MG/DOSE, SC), 0.05 mg , Disp: , Rfl:     Cyanocobalamin (VITAMIN B 12) 100 MCG LOZG, Take 500 mcg by mouth, Disp: , Rfl:     Omega-3 Fatty Acids (FISH OIL) 1000 MG CAPS, Take 1,200 mg by mouth, Disp: , Rfl:    brimonidine (ALPHAGAN) 0.2 % ophthalmic solution, INSTILL 1 DROP INTO THE RIGHT EYE TWICE A DAY, Disp: , Rfl:     cycloSPORINE (RESTASIS) 0.05 % ophthalmic emulsion, cycloSPORINE Restasis 0.05 drops 2 times per day 60 [facility] ([id number]), Disp: , Rfl:     Magnesium Oxide 420 MG TABS, TAKE ONE TABLET BY MOUTH TWICE A DAY, Disp: , Rfl:     Omega-3 Fatty Acids (FISH OIL) 1200 MG CAPS, Take 1,200 mg by mouth, Disp: , Rfl:     metFORMIN (GLUCOPHAGE) 1000 MG tablet, Take 1,000 mg by mouth daily (with breakfast), Disp: , Rfl:     Rosuvastatin Calcium 20 MG CPSP, Take by mouth daily, Disp: , Rfl:     Misc. Devices (ALL-BODY MASSAGE) MISC, Massage as needed for generalized body pain, Disp: 1 each, Rfl: 5    timolol (TIMOPTIC) 0.5 % ophthalmic solution, , Disp: , Rfl:     HM OMEGA-3-6-9 FATTY ACIDS CAPS, Take 1,200 mg by mouth daily, Disp: , Rfl:     pregabalin (LYRICA) 75 MG capsule, Take 75 mg by mouth 2 times daily. , Disp: , Rfl:     lisinopril (PRINIVIL;ZESTRIL) 10 MG tablet, Take 10 mg by mouth daily, Disp: , Rfl:     clopidogrel (PLAVIX) 75 MG tablet, Take 75 mg by mouth daily, Disp: , Rfl:     vitamin D (CHOLECALCIFEROL) 1000 UNIT TABS tablet, Take 1,000 Units by mouth daily, Disp: , Rfl:     Multiple Vitamins-Minerals (MULTI FOR HIM PO), Take by mouth daily, Disp: , Rfl:     aspirin 81 MG tablet, Take 81 mg by mouth 2 times daily , Disp: , Rfl:     verapamil (CALAN SR) 180 MG extended release tablet, Take 180 mg by mouth nightly, Disp: , Rfl:     insulin glargine (LANTUS) 100 UNIT/ML injection vial, Inject 34 Units into the skin As directed , Disp: , Rfl:      No Known Allergies     Past Medical History:   Diagnosis Date    [facility]cere[facility]) [facility] [address]     Gastroparesis     H/O asbestosis     History of paroxysmal supraventricular tachycardia 6/11/2019    Neuropathy     Prostate CA (HCC)     Sleep apnea with use of continuous positive airway pressure (CPAP)  TIA (transient ischemic attack)        Health Maintenance Due   Topic Date Due    Hepatitis C screen  Never done        Patient Active Problem List   Diagnosis    Essential hypertension    Type 2 diabetes mellitus ([facility] [address]    CVA (cerebral vascular accident) ([facility] [address]    Neuropathy due to type 2 diabetes mellitus ([facility] [address]    Sleep apnea with use of continuous positive airway pressure (CPAP)    Mixed hyperlipidemia    H/O asbestosis    Prostate CA ([facility] [address]    Lumbar and sacral osteoarthritis    History of paroxysmal supraventricular tachycardia    Neuropathy    Need for diphtheria-tetanus-pertussis (Tdap) vaccine    Vertigo    Stage 3 chronic kidney disease (HCC)    Pyelonephritis    Pulmonary nodules    Abnormal x-ray of pelvis    Unsteady gait when walking    Abnormal radionuclide bone scan    History of prostate cancer    Acute viral syndrome    Hip pain, chronic, right    Chronic right sacroiliac pain    History of CVA (cerebrovascular accident)        Past Surgical History:   Procedure Laterality Date    CERVICAL DISC SURGERY      CHOLECYSTECTOMY      KNEE ARTHROPLASTY      PROSTATE BIOPSY          Family History   Problem Relation Age of Onset    Dementia Mother         Social History     Tobacco Use    Smoking status: Never Smoker    Smokeless tobacco: Never Used   Substance Use Topics    Alcohol use: Not Currently    Drug use: Never        Objective  Vitals:    01/19/22 1259   BP: 110/62   Pulse: 65   Temp: 97.4 °F (36.3 °C)   SpO2: 95%   Weight: 190 lb (86.2 kg)   Height: 5' 8\" (1.727 m)        Exam:  Physical Exam  Vitals reviewed. Exam conducted with a chaperone present. Constitutional:       General: He is not in acute distress. Appearance: Normal appearance. He is well-developed. He is not ill-appearing. Comments: Walks with the aid of a cane. He is unsteady   HENT:      Head: Normocephalic and atraumatic.       Right Ear: External ear normal.      Left Ear: External ear normal.      Nose: Nose normal.      Mouth/Throat:      Pharynx: No oropharyngeal exudate. Eyes:      General: No scleral icterus. Right eye: No discharge. Left eye: No discharge. Conjunctiva/sclera: Conjunctivae normal.      Pupils: Pupils are equal, round, and reactive to light. Neck:      Thyroid: No thyromegaly. Cardiovascular:      Rate and Rhythm: Normal rate and regular rhythm. Heart sounds: Normal heart sounds. No murmur heard. No friction rub. No gallop. Pulmonary:      Effort: Pulmonary effort is normal. No respiratory distress. Breath sounds: Normal breath sounds. No wheezing or rales. Chest:      Chest wall: No tenderness. Abdominal:      General: Bowel sounds are normal. There is no distension. Palpations: Abdomen is soft. There is no mass. Tenderness: There is no abdominal tenderness. There is no guarding or rebound. Musculoskeletal:         General: No tenderness or deformity. Normal range of motion. Cervical back: Normal range of motion and neck supple. Lymphadenopathy:      Cervical: No cervical adenopathy. Skin:     General: Skin is warm and dry. Coloration: Skin is not pale. Findings: No erythema or rash. Neurological:      Mental Status: He is alert and oriented to person, place, and time. Cranial Nerves: No cranial nerve deficit. Sensory: No sensory deficit. Gait: Gait abnormal.      Deep Tendon Reflexes: Reflexes normal.      Comments: Unsteady   Psychiatric:         Behavior: Behavior normal.         Thought Content: Thought content normal.         Judgment: Judgment normal.          Last labs reviewed. ASSESSMENT & PLAN :   Problem List        Endocrine    Type 2 diabetes mellitus ([address]       continue medications. He is being monitored by PCP at the [place] and he has to send his blood sugars once a month her. Last hemoglobin A1c had improved to 6.8%.   Continue medications and monitor as before Relevant Medications    insulin glargine (LANTUS) 100 UNIT/ML injection vial    metFORMIN (GLUCOPHAGE) 1000 MG tablet    empagliflozin (JARDIANCE) 25 MG tablet    Semaglutide (OZEMPIC, 1 MG/DOSE, SC)       Genitourinary    Prostate CA [facility])       will send most recent PSA and bone scan to his urologist.  He will follow-up with Dr. Juan Luna in February         Relevant Medications    aspirin 81 MG tablet    pregabalin (LYRICA) 75 MG capsule    Stage 3 chronic kidney disease (HCC)       serum creatinine is 1.3 with an estimated GFR 53. He will avoid NSAIDs and will continue to monitor. Other    Mixed hyperlipidemia - Primary       continue diet and rosuvastatin. Check lipids in the future         Relevant Medications    lisinopril (PRINIVIL;ZESTRIL) 10 MG tablet    aspirin 81 MG tablet    verapamil (CALAN SR) 180 MG extended release tablet    Rosuvastatin Calcium 20 MG CPSP    History of CVA (cerebrovascular accident)       continue Plavix and aspirin                Return in about 3 months (around 4/19/2022), or DM.        Ponce Whatley DO  1/19/2022

## 2022-01-19 NOTE — PATIENT INSTRUCTIONS
Personalized Preventive Plan for Doyce Duty - 1/19/2022  Medicare offers a range of preventive health benefits. Some of the tests and screenings are paid in full while other may be subject to a deductible, co-insurance, and/or copay. Some of these benefits include a comprehensive review of your medical history including lifestyle, illnesses that may run in your family, and various assessments and screenings as appropriate. After reviewing your medical record and screening and assessments performed today your provider may have ordered immunizations, labs, imaging, and/or referrals for you. A list of these orders (if applicable) as well as your Preventive Care list are included within your After Visit Summary for your review. Other Preventive Recommendations:    · A preventive eye exam performed by an eye specialist is recommended every 1-2 years to screen for glaucoma; cataracts, macular degeneration, and other eye disorders. · A preventive dental visit is recommended every 6 months. · Try to get at least 150 minutes of exercise per week or 10,000 steps per day on a pedometer . · Order or download the FREE \"Exercise & Physical Activity: Your Everyday Guide\" from [employer]. Call 9-496.253.1862 or search [employer] online. · You need 6016-3338 mg of calcium and 7320-2347 IU of vitamin D per day. It is possible to meet your calcium requirement with diet alone, but a vitamin D supplement is usually necessary to meet this goal.  · When exposed to the sun, use a sunscreen that protects against both UVA and UVB radiation with an SPF of 30 or greater. Reapply every 2 to 3 hours or after sweating, drying off with a towel, or swimming. · Always wear a seat belt when traveling in a car. Always wear a helmet when riding a bicycle or motorcycle.

## 2022-01-19 NOTE — ASSESSMENT & PLAN NOTE
will send most recent PSA and bone scan to his urologist.  He will follow-up with Dr. Tonny Mcdaniel in February

## 2022-01-19 NOTE — ASSESSMENT & PLAN NOTE
continue medications. He is being monitored by PCP at the [place] and he has to send his blood sugars once a month her. Last hemoglobin A1c had improved to 6.8%.   Continue medications and monitor as before

## 2022-03-16 DIAGNOSIS — G62.9 NEUROPATHY: Primary | ICD-10-CM

## 2022-04-19 ENCOUNTER — OFFICE VISIT (OUTPATIENT)
Dept: PRIMARY CARE CLINIC | Age: 82
End: 2022-04-19
Payer: MEDICARE

## 2022-04-19 VITALS
DIASTOLIC BLOOD PRESSURE: 72 MMHG | SYSTOLIC BLOOD PRESSURE: 110 MMHG | WEIGHT: 197 LBS | HEIGHT: 68 IN | BODY MASS INDEX: 29.86 KG/M2 | TEMPERATURE: 97.1 F

## 2022-04-19 DIAGNOSIS — Z87.09 H/O ASBESTOSIS: ICD-10-CM

## 2022-04-19 DIAGNOSIS — Z79.4 TYPE 2 DIABETES MELLITUS WITH DIABETIC NEUROPATHY, WITH LONG-TERM CURRENT USE OF INSULIN (HCC): ICD-10-CM

## 2022-04-19 DIAGNOSIS — E78.2 MIXED HYPERLIPIDEMIA: ICD-10-CM

## 2022-04-19 DIAGNOSIS — E11.40 NEUROPATHY DUE TO TYPE 2 DIABETES MELLITUS (HCC): ICD-10-CM

## 2022-04-19 DIAGNOSIS — R09.02 HYPOXIA: Primary | ICD-10-CM

## 2022-04-19 DIAGNOSIS — E11.40 TYPE 2 DIABETES MELLITUS WITH DIABETIC NEUROPATHY, WITH LONG-TERM CURRENT USE OF INSULIN (HCC): ICD-10-CM

## 2022-04-19 DIAGNOSIS — Z85.46 HISTORY OF PROSTATE CANCER: ICD-10-CM

## 2022-04-19 DIAGNOSIS — R26.81 UNSTEADY GAIT WHEN WALKING: ICD-10-CM

## 2022-04-19 DIAGNOSIS — C61 PROSTATE CA (HCC): ICD-10-CM

## 2022-04-19 DIAGNOSIS — I10 ESSENTIAL HYPERTENSION: ICD-10-CM

## 2022-04-19 PROCEDURE — G8417 CALC BMI ABV UP PARAM F/U: HCPCS | Performed by: INTERNAL MEDICINE

## 2022-04-19 PROCEDURE — 1036F TOBACCO NON-USER: CPT | Performed by: INTERNAL MEDICINE

## 2022-04-19 PROCEDURE — G8427 DOCREV CUR MEDS BY ELIG CLIN: HCPCS | Performed by: INTERNAL MEDICINE

## 2022-04-19 PROCEDURE — 4040F PNEUMOC VAC/ADMIN/RCVD: CPT | Performed by: INTERNAL MEDICINE

## 2022-04-19 PROCEDURE — 1123F ACP DISCUSS/DSCN MKR DOCD: CPT | Performed by: INTERNAL MEDICINE

## 2022-04-19 PROCEDURE — 99214 OFFICE O/P EST MOD 30 MIN: CPT | Performed by: INTERNAL MEDICINE

## 2022-04-19 RX ORDER — KETOCONAZOLE 20 MG/G
CREAM TOPICAL
COMMUNITY
Start: 2022-04-14

## 2022-04-19 ASSESSMENT — ENCOUNTER SYMPTOMS
PHOTOPHOBIA: 0
COLOR CHANGE: 0
RHINORRHEA: 0
EYE DISCHARGE: 0
WHEEZING: 0
EYE ITCHING: 0
NAUSEA: 0
DIARRHEA: 0
FACIAL SWELLING: 0
VOMITING: 0
COUGH: 0
STRIDOR: 0
SHORTNESS OF BREATH: 0
EYE PAIN: 0
SORE THROAT: 0
ABDOMINAL PAIN: 0
CONSTIPATION: 0
BLOOD IN STOOL: 0
ANAL BLEEDING: 0
TROUBLE SWALLOWING: 0

## 2022-04-19 ASSESSMENT — PATIENT HEALTH QUESTIONNAIRE - PHQ9
1. LITTLE INTEREST OR PLEASURE IN DOING THINGS: 0
2. FEELING DOWN, DEPRESSED OR HOPELESS: 0
SUM OF ALL RESPONSES TO PHQ QUESTIONS 1-9: 0
SUM OF ALL RESPONSES TO PHQ9 QUESTIONS 1 & 2: 0
SUM OF ALL RESPONSES TO PHQ QUESTIONS 1-9: 0

## 2022-04-19 NOTE — ASSESSMENT & PLAN NOTE
Blood pressures are stable. Continue medications and monitor blood pressures at home. Call office if systolics are over 390 over diastolics over 90.

## 2022-04-19 NOTE — PROGRESS NOTES
2022    Name: Foster James : 1940 Sex: male  Age: 80 y.o. Subjective:  Chief Complaint   Patient presents with    Hyperlipidemia     3 month visit        Hypertension  Pertinent negatives include no chest pain, headaches, palpitations or shortness of breath. Hyperlipidemia  Pertinent negatives include no chest pain, myalgias or shortness of breath. His back and hip pain is much better. He responded well to as Medrol Dosepak. Interestingly his PCP at the [place] [facility] had been increasing his Ozempic and when she heard of his hip pain she decreased it and now he is down to 0.5 mg a day and he says his pain is much more tolerable. .  .  .  . He had a nuclear medicine bone scan because of his history of prostate CA we wanted to make sure that he did not have any metastatic lesions. He did not have any metastatic lesions. There was no loosening of his hardware. He will be following up with Dr. Titi Zepeda in the near future. He has had his COVID-19 vaccine series. He had his booster dose. He  had his flu shot. He has a history of type 2 diabetes mellitus and says his sugars are quite good. He is had problems with his appetite, he just does not have any appetite and is lost about 8 pounds since October. He has early satiety. He denies any bowel problems, no bloating or constipation, he denies any urinary problems. His weight has been stable for the last couple of months. He has a history of prostate cancer, diabetic neuropathy,  CVA, hypertension, asbestosis and pulmonary nodules along with paroxysmal SVT. He will be seeing his urologist and will send copies of his PSA and bone scan to him. He last saw his cardiologist Dr. Edgard Camejo on 2021. He was pleased with his progress. He saw Dr. MICHELINE GUY \[facility]"" [facility] last week and lesion on his scalp was biopsied. He does not have the final result. Blood work was done at the [place] on 3/14/2022.   His hemoglobin A1c was 7% with a fasting blood sugar of 113. PSA slightly elevated at 6.19 and he follows up with urologist for this. Creatinine was 1.1 but his estimated GFR was good at 65. TSH and B12 levels were normal.  Total cholesterol 116, LDL cholesterol 76 and triglycerides are 157. [facility] Lantus to 32 units and discontinued his Jardiance. Ayo Quarry was also discontinued because he had recurrent UTIs. Metformin was increased to 1000 mg twice daily. Depression screen was done and his PHQ 2 score was 0    His hands are very cold and I checked his O2 saturation and it would run around 86% and then after a while was up to 97% and then back down again. We will get an overnight pulse oximetry on him. He has a history of obstructive sleep apnea but has been unable to use his CPAP machine for years    He sees Dr. Tasha Mclean,  last saw him on 3/15/2022. Has him on eyedrops. Review of Systems   Constitutional: Negative for appetite change, fatigue and unexpected weight change. HENT: Negative for congestion, ear pain, facial swelling, rhinorrhea, sore throat, tinnitus and trouble swallowing. Eyes: Negative for photophobia, pain, discharge, itching and visual disturbance. Respiratory: Negative for cough, shortness of breath, wheezing and stridor. Cardiovascular: Negative for chest pain, palpitations and leg swelling. Gastrointestinal: Negative for abdominal pain, anal bleeding, blood in stool, constipation, diarrhea, nausea and vomiting. Endocrine: Negative for cold intolerance, heat intolerance, polydipsia, polyphagia and polyuria. Genitourinary: Negative for difficulty urinating, dysuria, flank pain, frequency, hematuria and urgency. Musculoskeletal: Positive for gait problem. Negative for arthralgias, joint swelling and myalgias. Skin: Negative for color change, pallor and rash. Allergic/Immunologic: Negative for environmental allergies and food allergies.    Neurological: Positive for numbness. Negative for dizziness, tremors, seizures, syncope, speech difficulty, weakness, light-headedness and headaches. Hematological: Negative for adenopathy. Does not bruise/bleed easily. Psychiatric/Behavioral: Negative for agitation, behavioral problems, confusion, sleep disturbance and suicidal ideas. The patient is not nervous/anxious. Current Outpatient Medications:     ketoconazole (NIZORAL) 2 % cream, APPLY TWICE DAILY TO FACE AND SCALP AS NEEDED, Disp: , Rfl:     Handicap Placard MISC, by Does not apply route Duration 5 years, Disp: 2 each, Rfl: 0    Semaglutide (OZEMPIC, 1 MG/DOSE, SC), 0.05 mg , Disp: , Rfl:     Cyanocobalamin (VITAMIN B 12) 100 MCG LOZG, Take 500 mcg by mouth, Disp: , Rfl:     Omega-3 Fatty Acids (FISH OIL) 1000 MG CAPS, Take 1,200 mg by mouth, Disp: , Rfl:     brimonidine (ALPHAGAN) 0.2 % ophthalmic solution, INSTILL 1 DROP INTO THE RIGHT EYE TWICE A DAY, Disp: , Rfl:     Magnesium Oxide 420 MG TABS, TAKE ONE TABLET BY MOUTH TWICE A DAY, Disp: , Rfl:     Omega-3 Fatty Acids (FISH OIL) 1200 MG CAPS, Take 1,200 mg by mouth, Disp: , Rfl:     metFORMIN (GLUCOPHAGE) 1000 MG tablet, Take 1,000 mg by mouth 2 times daily (with meals), Disp: , Rfl:     Rosuvastatin Calcium 20 MG CPSP, Take by mouth daily, Disp: , Rfl:     Misc. Devices (ALL-BODY MASSAGE) MISC, Massage as needed for generalized body pain, Disp: 1 each, Rfl: 5    timolol (TIMOPTIC) 0.5 % ophthalmic solution, , Disp: , Rfl:     HM OMEGA-3-6-9 FATTY ACIDS CAPS, Take 1,200 mg by mouth daily, Disp: , Rfl:     pregabalin (LYRICA) 75 MG capsule, Take 75 mg by mouth 2 times daily. , Disp: , Rfl:     lisinopril (PRINIVIL;ZESTRIL) 10 MG tablet, Take 10 mg by mouth daily, Disp: , Rfl:     clopidogrel (PLAVIX) 75 MG tablet, Take 75 mg by mouth daily, Disp: , Rfl:     vitamin D (CHOLECALCIFEROL) 1000 UNIT TABS tablet, Take 1,000 Units by mouth daily, Disp: , Rfl:     Multiple Vitamins-Minerals (MULTI FOR HIM PO), Take by mouth daily, Disp: , Rfl:     aspirin 81 MG tablet, Take 81 mg by mouth 2 times daily , Disp: , Rfl:     insulin glargine (LANTUS) 100 UNIT/ML injection vial, Inject 32 Units into the skin As directed , Disp: , Rfl:      No Known Allergies     Past Medical History:   Diagnosis Date    [facility]     Gastroparesis     H/O asbestosis     History of paroxysmal supraventricular tachycardia 6/11/2019    Neuropathy     Prostate CA ([facility]     Sleep apnea with use of continuous positive airway pressure (CPAP)     TIA (transient ischemic attack)        Health Maintenance Due   Topic Date Due    Hepatitis C screen  Never done        Patient Active Problem List   Diagnosis    Essential hypertension    Type 2 diabetes mellitus ([facility]    [facility]A [facility]cerebral vascular accident) ([facility]    Neuropathy due to type 2 diabetes mellitus ([facility] [facility]    Sleep apnea with use of continuous positive airway pressure (CPAP)    Mixed hyperlipidemia    H/O asbestosis    [facility]    Lumbar and sacral osteoarthritis    History of paroxysmal supraventricular tachycardia    Neuropathy    Need for diphtheria-tetanus-pertussis (Tdap) vaccine    Vertigo    Stage 3 chronic kidney disease (HCC)    Pyelonephritis    Pulmonary nodules    Abnormal x-ray of pelvis    Unsteady gait when walking    Abnormal radionuclide bone scan    History of prostate cancer    Acute viral syndrome    Hip pain, chronic, right    Chronic right sacroiliac pain    History of CVA (cerebrovascular accident)    Hypoxia        Past Surgical History:   Procedure Laterality Date    CERVICAL DISC SURGERY      CHOLECYSTECTOMY      KNEE ARTHROPLASTY      PROSTATE BIOPSY          Family History   Problem Relation Age of Onset    Dementia Mother         Social History     Tobacco Use    Smoking status: Never Smoker    Smokeless tobacco: Never Used   Substance Use Topics    Alcohol use: Not Currently    Drug use: Never        Objective  Vitals:    04/19/22 1303   BP: 110/72   Temp: 97.1 °F (36.2 °C)   Weight: 197 lb (89.4 kg)   Height: 5' 8\" (1.727 m)        Exam:  Physical Exam  Vitals reviewed. Exam conducted with a chaperone present. Constitutional:       General: He is not in acute distress. Appearance: Normal appearance. He is well-developed. He is not ill-appearing. Comments: Walks with the aid of a cane. He is unsteady   HENT:      Head: Normocephalic and atraumatic. Right Ear: External ear normal.      Left Ear: External ear normal.      Nose: Nose normal.      Mouth/Throat:      Pharynx: No oropharyngeal exudate. Eyes:      General: No scleral icterus. Right eye: No discharge. Left eye: No discharge. Conjunctiva/sclera: Conjunctivae normal.      Pupils: Pupils are equal, round, and reactive to light. Neck:      Thyroid: No thyromegaly. Cardiovascular:      Rate and Rhythm: Normal rate and regular rhythm. Heart sounds: Normal heart sounds. No murmur heard. No friction rub. No gallop. Pulmonary:      Effort: Pulmonary effort is normal. No respiratory distress. Breath sounds: Normal breath sounds. No wheezing or rales. Chest:      Chest wall: No tenderness. Abdominal:      General: Bowel sounds are normal. There is no distension. Palpations: Abdomen is soft. There is no mass. Tenderness: There is no abdominal tenderness. There is no guarding or rebound. Musculoskeletal:         General: No tenderness or deformity. Normal range of motion. Cervical back: Normal range of motion and neck supple. Lymphadenopathy:      Cervical: No cervical adenopathy. Skin:     General: Skin is warm and dry. Coloration: Skin is not pale. Findings: No erythema or rash. Neurological:      Mental Status: He is alert and oriented to person, place, and time. Cranial Nerves: No cranial nerve deficit. Sensory: No sensory deficit.       Gait: Gait abnormal.      Deep Tendon Reflexes: Reflexes normal.      Comments: Unsteady   Psychiatric:         Behavior: Behavior normal.         Thought Content: Thought content normal.         Judgment: Judgment normal.          Last labs reviewed. ASSESSMENT & PLAN :   Problem List        Circulatory    Essential hypertension     Blood pressures are stable. Continue medications and monitor blood pressures at home. Call office if systolics are over 703 over diastolics over 90. Respiratory    Hypoxia - Primary       check overnight pulse oximetry on him to see if he needs oxygen at home         Relevant Orders    Home O2 eval (desaturation screen)       Endocrine    Type 2 diabetes mellitus ([facility]     Watch his diet. Monitor his blood sugars as directed. Let me know if his blood sugars are consistently elevated over 120 fasting. continue metformin, Ozempic and Lantus         Relevant Medications    insulin glargine (LANTUS) 100 UNIT/ML injection vial    metFORMIN (GLUCOPHAGE) 1000 MG tablet    Semaglutide (OZEMPIC, 1 MG/DOSE, SC)    Neuropathy due to type 2 diabetes mellitus ([facility] [facility]       continue Lyrica as directed         Relevant Medications    insulin glargine (LANTUS) 100 UNIT/ML injection vial    metFORMIN (GLUCOPHAGE) 1000 MG tablet    Semaglutide (OZEMPIC, 1 MG/DOSE, SC)       Genitourinary    Prostate CA [facility])       patient released by Dr. Cliff Cho as his bone scan did not show any significant change. He will follow-up with his urologist         Relevant Medications    aspirin 81 MG tablet    pregabalin (LYRICA) 75 MG capsule       Other    Mixed hyperlipidemia     Watch saturated fats in diet and will monitor lipids continue rosuvastatin         Relevant Medications    lisinopril (PRINIVIL;ZESTRIL) 10 MG tablet    aspirin 81 MG tablet    Rosuvastatin Calcium 20 MG CPSP    H/O asbestosis       may be underlying reason for his hypoxia.   We will continue to monitor         Unsteady gait when walking use wheeled walker         History of prostate cancer       follow-up with urologist who monitors his PSA                Return in about 3 months (around 7/19/2022), or 3 months for DM.        Rakel Azar,   4/19/2022

## 2022-04-19 NOTE — ASSESSMENT & PLAN NOTE
patient released by Dr. Milly Dodd as his bone scan did not show any significant change.   He will follow-up with his urologist

## 2022-04-19 NOTE — ASSESSMENT & PLAN NOTE
Watch his diet. Monitor his blood sugars as directed. Let me know if his blood sugars are consistently elevated over 120 fasting.   continue metformin, Ozempic and Lantus

## 2022-07-19 ENCOUNTER — OFFICE VISIT (OUTPATIENT)
Dept: PRIMARY CARE CLINIC | Age: 82
End: 2022-07-19
Payer: MEDICARE

## 2022-07-19 VITALS
OXYGEN SATURATION: 91 % | WEIGHT: 199 LBS | TEMPERATURE: 97 F | HEART RATE: 56 BPM | DIASTOLIC BLOOD PRESSURE: 64 MMHG | HEIGHT: 68 IN | SYSTOLIC BLOOD PRESSURE: 124 MMHG | BODY MASS INDEX: 30.16 KG/M2

## 2022-07-19 DIAGNOSIS — E78.2 MIXED HYPERLIPIDEMIA: ICD-10-CM

## 2022-07-19 DIAGNOSIS — Z79.4 TYPE 2 DIABETES MELLITUS WITH DIABETIC NEUROPATHY, WITH LONG-TERM CURRENT USE OF INSULIN (HCC): Primary | ICD-10-CM

## 2022-07-19 DIAGNOSIS — I10 ESSENTIAL HYPERTENSION: ICD-10-CM

## 2022-07-19 DIAGNOSIS — E11.40 TYPE 2 DIABETES MELLITUS WITH DIABETIC NEUROPATHY, WITH LONG-TERM CURRENT USE OF INSULIN (HCC): Primary | ICD-10-CM

## 2022-07-19 DIAGNOSIS — E11.40 NEUROPATHY DUE TO TYPE 2 DIABETES MELLITUS (HCC): ICD-10-CM

## 2022-07-19 DIAGNOSIS — R26.81 UNSTEADY GAIT WHEN WALKING: ICD-10-CM

## 2022-07-19 PROBLEM — G47.30 SLEEP APNEA WITH USE OF CONTINUOUS POSITIVE AIRWAY PRESSURE (CPAP): Status: RESOLVED | Noted: 2019-05-13 | Resolved: 2022-07-19

## 2022-07-19 PROCEDURE — 99213 OFFICE O/P EST LOW 20 MIN: CPT | Performed by: INTERNAL MEDICINE

## 2022-07-19 PROCEDURE — 1123F ACP DISCUSS/DSCN MKR DOCD: CPT | Performed by: INTERNAL MEDICINE

## 2022-07-19 SDOH — ECONOMIC STABILITY: FOOD INSECURITY: WITHIN THE PAST 12 MONTHS, THE FOOD YOU BOUGHT JUST DIDN'T LAST AND YOU DIDN'T HAVE MONEY TO GET MORE.: NEVER TRUE

## 2022-07-19 SDOH — ECONOMIC STABILITY: FOOD INSECURITY: WITHIN THE PAST 12 MONTHS, YOU WORRIED THAT YOUR FOOD WOULD RUN OUT BEFORE YOU GOT MONEY TO BUY MORE.: NEVER TRUE

## 2022-07-19 ASSESSMENT — ENCOUNTER SYMPTOMS
COLOR CHANGE: 0
NAUSEA: 0
VOMITING: 0
ABDOMINAL PAIN: 0
ANAL BLEEDING: 0
FACIAL SWELLING: 0
DIARRHEA: 0
CONSTIPATION: 0
STRIDOR: 0
TROUBLE SWALLOWING: 0
SHORTNESS OF BREATH: 0
BLOOD IN STOOL: 0
EYE DISCHARGE: 0
PHOTOPHOBIA: 0
EYE ITCHING: 0
RHINORRHEA: 0
SORE THROAT: 0
WHEEZING: 0
COUGH: 0
EYE PAIN: 0

## 2022-07-19 ASSESSMENT — SOCIAL DETERMINANTS OF HEALTH (SDOH): HOW HARD IS IT FOR YOU TO PAY FOR THE VERY BASICS LIKE FOOD, HOUSING, MEDICAL CARE, AND HEATING?: NOT HARD AT ALL

## 2022-07-19 NOTE — ASSESSMENT & PLAN NOTE
be very careful as he is unsteady. He does not like to use a walker.   Has not fallen within the last 6 months

## 2022-07-19 NOTE — PROGRESS NOTES
2022    Name: Divya Nieto : 1940 Sex: male  Age: 80 y.o. Subjective:  Chief Complaint   Patient presents with    Hypertension        Hypertension  Pertinent negatives include no chest pain, headaches, palpitations or shortness of breath. Back and hip pain have resolved. He has gait problems secondary to his age neuropathy. He sees [facility] [employer] He also sees his cardiologist Dr. Temitope Mendez once a year. He saw Dr. Janet Ariza today for follow-up on his prostate cancer. He had a nuclear medicine bone scan because of his history of prostate CA we wanted to make sure that he did not have any metastatic lesions. He did not have any metastatic lesions. There was no loosening of his hardware. He will be following up with Dr. Chandan Lay in the near future. He has a history of type 2 diabetes mellitus and says his sugars are quite good. He is had problems with his appetite, he just does not have any appetite and is lost about 8 pounds since October. He has early satiety. He denies any bowel problems, no bloating or constipation, he denies any urinary problems. His weight has been stable for the last couple of months. We will going to check his hemoglobin A1c today. He has a history of prostate cancer, diabetic neuropathy,  CVA, hypertension, asbestosis and pulmonary nodules along with paroxysmal SVT. He will be seeing his urologist and will send copies of his PSA and bone scan to him. He last saw his cardiologist Dr. Temitope Mendez on 2021. He was pleased with his progress. Review of Systems   Constitutional:  Negative for appetite change, fatigue and unexpected weight change. HENT:  Negative for congestion, ear pain, facial swelling, rhinorrhea, sore throat, tinnitus and trouble swallowing. Eyes:  Negative for photophobia, pain, discharge, itching and visual disturbance. Respiratory:  Negative for cough, shortness of breath, wheezing and stridor.     Cardiovascular: Continue Norco as needed Devices (ALL-BODY MASSAGE) MISC, Massage as needed for generalized body pain, Disp: 1 each, Rfl: 5    timolol (TIMOPTIC) 0.5 % ophthalmic solution, , Disp: , Rfl:     HM OMEGA-3-6-9 FATTY ACIDS CAPS, Take 1,200 mg by mouth daily, Disp: , Rfl:     pregabalin (LYRICA) 75 MG capsule, Take 75 mg by mouth 2 times daily. , Disp: , Rfl:     lisinopril (PRINIVIL;ZESTRIL) 10 MG tablet, Take 10 mg by mouth daily, Disp: , Rfl:     clopidogrel (PLAVIX) 75 MG tablet, Take 75 mg by mouth daily, Disp: , Rfl:     vitamin D (CHOLECALCIFEROL) 1000 UNIT TABS tablet, Take 1,000 Units by mouth daily, Disp: , Rfl:     Multiple Vitamins-Minerals (MULTI FOR HIM PO), Take by mouth daily, Disp: , Rfl:     aspirin 81 MG tablet, Take 81 mg by mouth 2 times daily , Disp: , Rfl:     insulin glargine (LANTUS) 100 UNIT/ML injection vial, Inject 32 Units into the skin As directed , Disp: , Rfl:      No Known Allergies     Past Medical History:   Diagnosis Date    CVA (cerebral vascular accident) ([address]     Gastroparesis     H/O asbestosis     History of paroxysmal supraventricular tachycardia 6/11/2019    Neuropathy     Prostate CA ([address]     Sleep apnea with use of continuous positive airway pressure (CPAP)     TIA (transient ischemic attack)        Health Maintenance Due   Topic Date Due    COVID-19 Vaccine (4 - Booster for Pfizer series) 04/11/2022        Patient Active Problem List   Diagnosis    Essential hypertension    Type 2 diabetes mellitus ([address]    CVA (cerebral vascular accident) ([address]    Neuropathy due to type 2 diabetes mellitus ([address]    Mixed hyperlipidemia    H/O asbestosis    Prostate CA ([address]    Lumbar and sacral osteoarthritis    History of paroxysmal supraventricular tachycardia    Neuropathy    Need for diphtheria-tetanus-pertussis (Tdap) vaccine    Vertigo    Stage 3 chronic kidney disease ([address]    Pyelonephritis    Pulmonary nodules    Abnormal x-ray of pelvis    Unsteady gait when walking    Abnormal radionuclide bone scan History of prostate cancer    Acute viral syndrome    Hip pain, chronic, right    Chronic right sacroiliac pain    History of CVA (cerebrovascular accident)    Hypoxia        Past Surgical History:   Procedure Laterality Date    CERVICAL DISC SURGERY      CHOLECYSTECTOMY      KNEE ARTHROPLASTY      PROSTATE BIOPSY          Family History   Problem Relation Age of Onset    Dementia Mother         Social History     Tobacco Use    Smoking status: Never    Smokeless tobacco: Never   Substance Use Topics    Alcohol use: Not Currently    Drug use: Never        Objective  Vitals:    07/19/22 1332   BP: 124/64   Pulse: 56   Temp: 97 °F (36.1 °C)   SpO2: 91%   Weight: 199 lb (90.3 kg)   Height: 5' 8\" (1.727 m)        Exam:  Physical Exam  Vitals reviewed. Exam conducted with a chaperone present. Constitutional:       General: He is not in acute distress. Appearance: Normal appearance. He is well-developed. He is not ill-appearing. Comments: Walks with the aid of a cane. He is unsteady   HENT:      Head: Normocephalic and atraumatic. Right Ear: External ear normal.      Left Ear: External ear normal.      Nose: Nose normal.      Mouth/Throat:      Pharynx: No oropharyngeal exudate. Eyes:      General: No scleral icterus. Right eye: No discharge. Left eye: No discharge. Conjunctiva/sclera: Conjunctivae normal.      Pupils: Pupils are equal, round, and reactive to light. Neck:      Thyroid: No thyromegaly. Cardiovascular:      Rate and Rhythm: Normal rate and regular rhythm. Heart sounds: Normal heart sounds. No murmur heard. No friction rub. No gallop. Pulmonary:      Effort: Pulmonary effort is normal. No respiratory distress. Breath sounds: Normal breath sounds. No wheezing or rales. Chest:      Chest wall: No tenderness. Abdominal:      General: Bowel sounds are normal. There is no distension. Palpations: Abdomen is soft. There is no mass.       Tenderness: There is no abdominal tenderness. There is no guarding or rebound. Musculoskeletal:         General: No tenderness or deformity. Normal range of motion. Cervical back: Normal range of motion and neck supple. Lymphadenopathy:      Cervical: No cervical adenopathy. Skin:     General: Skin is warm and dry. Coloration: Skin is not pale. Findings: No erythema or rash. Neurological:      Mental Status: He is alert and oriented to person, place, and time. Cranial Nerves: No cranial nerve deficit. Sensory: No sensory deficit. Gait: Gait abnormal.      Deep Tendon Reflexes: Reflexes normal.      Comments: Unsteady   Psychiatric:         Behavior: Behavior normal.         Thought Content: Thought content normal.         Judgment: Judgment normal.        Last labs reviewed. ASSESSMENT & PLAN :   Problem List          Circulatory    Essential hypertension     Blood pressures are stable. Continue medications and monitor blood pressures at home. Call office if systolics are over 241 over diastolics over 90. Endocrine    Type 2 diabetes mellitus ([facility] [address] - Primary       blood sugars have been good at home. Hemoglobin A1c is down to 6.2%. Continue medications. He has no hypoglycemic episodes.          Relevant Medications    insulin glargine (LANTUS) 100 UNIT/ML injection vial    metFORMIN (GLUCOPHAGE) 1000 MG tablet    Semaglutide (OZEMPIC, 1 MG/DOSE, SC)    Other Relevant Orders    Hemoglobin A1C    Neuropathy due to type 2 diabetes mellitus (HCC)       continue Lyrica         Relevant Medications    insulin glargine (LANTUS) 100 UNIT/ML injection vial    metFORMIN (GLUCOPHAGE) 1000 MG tablet    Semaglutide (OZEMPIC, 1 MG/DOSE, SC)       Other    Mixed hyperlipidemia     Watch saturated fats in diet and will monitor lipids  continue rosuvastatin at         Relevant Medications    lisinopril (PRINIVIL;ZESTRIL) 10 MG tablet    aspirin 81 MG tablet    Rosuvastatin Calcium 20 MG CPSP    Unsteady gait when walking       be very careful as he is unsteady. He does not like to use a walker. Has not fallen within the last 6 months             Return in about 3 months (around 10/19/2022), or DM, HTN.        Nieves Pat, DO  7/19/2022

## 2022-07-19 NOTE — ASSESSMENT & PLAN NOTE
Blood pressures are stable. Continue medications and monitor blood pressures at home. Call office if systolics are over 064 over diastolics over 90.

## 2022-10-19 ENCOUNTER — OFFICE VISIT (OUTPATIENT)
Dept: PRIMARY CARE CLINIC | Age: 82
End: 2022-10-19
Payer: MEDICARE

## 2022-10-19 VITALS
SYSTOLIC BLOOD PRESSURE: 102 MMHG | BODY MASS INDEX: 30.46 KG/M2 | DIASTOLIC BLOOD PRESSURE: 60 MMHG | WEIGHT: 201 LBS | TEMPERATURE: 97.8 F | HEIGHT: 68 IN

## 2022-10-19 DIAGNOSIS — I10 ESSENTIAL HYPERTENSION: Primary | ICD-10-CM

## 2022-10-19 DIAGNOSIS — E11.40 TYPE 2 DIABETES MELLITUS WITH DIABETIC NEUROPATHY, WITH LONG-TERM CURRENT USE OF INSULIN (HCC): ICD-10-CM

## 2022-10-19 DIAGNOSIS — E11.40 NEUROPATHY DUE TO TYPE 2 DIABETES MELLITUS (HCC): ICD-10-CM

## 2022-10-19 DIAGNOSIS — C61 PROSTATE CA (HCC): ICD-10-CM

## 2022-10-19 DIAGNOSIS — Z79.4 TYPE 2 DIABETES MELLITUS WITH DIABETIC NEUROPATHY, WITH LONG-TERM CURRENT USE OF INSULIN (HCC): ICD-10-CM

## 2022-10-19 DIAGNOSIS — R26.81 UNSTEADY GAIT WHEN WALKING: ICD-10-CM

## 2022-10-19 PROBLEM — N18.30 STAGE 3 CHRONIC KIDNEY DISEASE (HCC): Status: RESOLVED | Noted: 2019-12-16 | Resolved: 2022-10-19

## 2022-10-19 PROBLEM — R09.02 HYPOXIA: Status: RESOLVED | Noted: 2022-04-19 | Resolved: 2022-10-19

## 2022-10-19 PROBLEM — I63.9 CVA (CEREBRAL VASCULAR ACCIDENT) (HCC): Status: RESOLVED | Noted: 2019-05-13 | Resolved: 2022-10-19

## 2022-10-19 PROBLEM — N12 PYELONEPHRITIS: Status: RESOLVED | Noted: 2021-05-25 | Resolved: 2022-10-19

## 2022-10-19 PROCEDURE — G8427 DOCREV CUR MEDS BY ELIG CLIN: HCPCS | Performed by: INTERNAL MEDICINE

## 2022-10-19 PROCEDURE — G8484 FLU IMMUNIZE NO ADMIN: HCPCS | Performed by: INTERNAL MEDICINE

## 2022-10-19 PROCEDURE — 1123F ACP DISCUSS/DSCN MKR DOCD: CPT | Performed by: INTERNAL MEDICINE

## 2022-10-19 PROCEDURE — G8417 CALC BMI ABV UP PARAM F/U: HCPCS | Performed by: INTERNAL MEDICINE

## 2022-10-19 PROCEDURE — 1036F TOBACCO NON-USER: CPT | Performed by: INTERNAL MEDICINE

## 2022-10-19 PROCEDURE — 99214 OFFICE O/P EST MOD 30 MIN: CPT | Performed by: INTERNAL MEDICINE

## 2022-10-19 RX ORDER — TAMSULOSIN HYDROCHLORIDE 0.4 MG/1
0.4 CAPSULE ORAL DAILY
COMMUNITY

## 2022-10-19 ASSESSMENT — ENCOUNTER SYMPTOMS
ANAL BLEEDING: 0
EYE DISCHARGE: 0
SHORTNESS OF BREATH: 0
ABDOMINAL PAIN: 0
EYE ITCHING: 0
FACIAL SWELLING: 0
RHINORRHEA: 0
DIARRHEA: 0
STRIDOR: 0
PHOTOPHOBIA: 0
SORE THROAT: 0
TROUBLE SWALLOWING: 0
COLOR CHANGE: 0
NAUSEA: 0
WHEEZING: 0
CONSTIPATION: 0
VOMITING: 0
EYE PAIN: 0
COUGH: 0
BLOOD IN STOOL: 0

## 2022-10-19 NOTE — ASSESSMENT & PLAN NOTE
use his walker at all times. [facility] to [facility] at [facility].   They have put grab bars in his bathroom and are monitoring him for safety at home

## 2022-10-19 NOTE — ASSESSMENT & PLAN NOTE
Blood pressures are stable. Continue medications and monitor blood pressures at home. Call office if systolics are over 427 over diastolics over 90.

## 2022-10-19 NOTE — PROGRESS NOTES
10/19/2022    Name: Karissa Last : 1940 Sex: male  Age: 80 y.o. Subjective:  Chief Complaint   Patient presents with    Diabetes        Hypertension  Pertinent negatives include no chest pain, headaches, palpitations or shortness of breath. Diabetes  Pertinent negatives for hypoglycemia include no confusion, dizziness, headaches, nervousness/anxiousness, pallor, seizures, speech difficulty or tremors. Pertinent negatives for diabetes include no chest pain, no fatigue, no polydipsia, no polyphagia, no polyuria and no weakness. Back and hip pain have resolved. He has gait problems secondary to his  neuropathy. He has some numbness and loss of strength in his hands. He sees [employer]. In early October he saw Dr. Prema Rooney  for follow-up on his prostate cancer. His PSA was down to 5.6. He had a nuclear medicine bone scan because of his history of prostate CA we wanted to make sure that he did not have any metastatic lesions. He did not have any metastatic lesions. There was no loosening of his hardware. He will be following up with a new oncologist in the near future. Reviewed records from the [place]. On 2022 blood work was done. CBC was acceptable, urine showed slightly increased microalbumin in his urine with a microalbumin creatinine ratio 487. 1. Hemoglobin A1c was excellent at 6.7% the rest of his CMP was unremarkable his cholesterol showed total cholesterol of 121 LDL of 67 HDL 49 triglycerides of 96. Fasting blood sugar was 109, BUN was 26 liver enzymes were normal estimated GFR was 68    He has a history of type 2 diabetes mellitus and says his sugars are quite good. He has early satiety. He denies any bowel problems, no bloating or constipation, he denies any urinary problems. His weight has been stable for the last couple of months.     [facility] at the [place] discontinued his insulin and continued him on Ozempic 0.5 mg subcu weekly and metformin 5000 mg twice daily    He saw another doctor at the [facility] Dr. Mic Lopez who increased his pregabalin to 150 mg twice a day. He saw Dr. Joelle Madrid on October 6 who did his diabetic examination. On November 1 he will see his podiatrist at the [facility] for his diabetic foot examination    On November 17 he will see Dr. CAS[facility] for his skin check    He has a history of prostate cancer, diabetic neuropathy,  CVA, hypertension, asbestosis and pulmonary nodules along with paroxysmal SVT. He will be seeing his urologist and will send copies of his PSA and bone scan to him. He last saw his cardiologist Dr. Radha Delgadillo on April 2022. He was pleased with his progress. He will be seeing him once a year. He had his quadrivalent flu shot and his fourth COVID shot at the [facility] last month    Review of Systems   Constitutional:  Negative for appetite change, fatigue and unexpected weight change. HENT:  Negative for congestion, ear pain, facial swelling, rhinorrhea, sore throat, tinnitus and trouble swallowing. Eyes:  Negative for photophobia, pain, discharge, itching and visual disturbance. Respiratory:  Negative for cough, shortness of breath, wheezing and stridor. Cardiovascular:  Negative for chest pain, palpitations and leg swelling. Gastrointestinal:  Negative for abdominal pain, anal bleeding, blood in stool, constipation, diarrhea, nausea and vomiting. Endocrine: Negative for cold intolerance, heat intolerance, polydipsia, polyphagia and polyuria. Genitourinary:  Negative for difficulty urinating, dysuria, flank pain, frequency, hematuria and urgency. Musculoskeletal:  Positive for gait problem. Negative for arthralgias, joint swelling and myalgias. Skin:  Negative for color change, pallor and rash. Allergic/Immunologic: Negative for environmental allergies and food allergies. Neurological:  Positive for numbness.  Negative for dizziness, tremors, seizures, syncope, speech difficulty, weakness, light-headedness and headaches. Hematological:  Negative for adenopathy. Does not bruise/bleed easily. Psychiatric/Behavioral:  Negative for agitation, behavioral problems, confusion, sleep disturbance and suicidal ideas. The patient is not nervous/anxious. Current Outpatient Medications:     tamsulosin (FLOMAX) 0.4 MG capsule, Take 0.4 mg by mouth daily, Disp: , Rfl:     ketoconazole (NIZORAL) 2 % cream, APPLY TWICE DAILY TO FACE AND SCALP AS NEEDED, Disp: , Rfl:     Handicap Placard MISC, by Does not apply route Duration 5 years, Disp: 2 each, Rfl: 0    Semaglutide (OZEMPIC, 1 MG/DOSE, SC), 0.05 mg , Disp: , Rfl:     Cyanocobalamin (VITAMIN B 12) 100 MCG LOZG, Take 500 mcg by mouth, Disp: , Rfl:     Omega-3 Fatty Acids (FISH OIL) 1000 MG CAPS, Take 1,200 mg by mouth, Disp: , Rfl:     brimonidine (ALPHAGAN) 0.2 % ophthalmic solution, INSTILL 1 DROP INTO THE RIGHT EYE TWICE A DAY, Disp: , Rfl:     Magnesium Oxide 420 MG TABS, TAKE ONE TABLET BY MOUTH TWICE A DAY, Disp: , Rfl:     Omega-3 Fatty Acids (FISH OIL) 1200 MG CAPS, Take 1,200 mg by mouth, Disp: , Rfl:     metFORMIN (GLUCOPHAGE) 1000 MG tablet, Take 1,000 mg by mouth 2 times daily (with meals), Disp: , Rfl:     Rosuvastatin Calcium 20 MG CPSP, Take by mouth daily, Disp: , Rfl:     Misc. Devices (ALL-BODY MASSAGE) MISC, Massage as needed for generalized body pain, Disp: 1 each, Rfl: 5    timolol (TIMOPTIC) 0.5 % ophthalmic solution, , Disp: , Rfl:     HM OMEGA-3-6-9 FATTY ACIDS CAPS, Take 1,200 mg by mouth daily, Disp: , Rfl:     pregabalin (LYRICA) 75 MG capsule, Take 75 mg by mouth 2 times daily.  Taking 150mg  BID, Disp: , Rfl:     lisinopril (PRINIVIL;ZESTRIL) 10 MG tablet, Take 10 mg by mouth daily, Disp: , Rfl:     clopidogrel (PLAVIX) 75 MG tablet, Take 75 mg by mouth daily, Disp: , Rfl:     vitamin D (CHOLECALCIFEROL) 1000 UNIT TABS tablet, Take 1,000 Units by mouth daily, Disp: , Rfl:     Multiple Vitamins-Minerals (MULTI FOR HIM PO), Take by mouth daily, Disp: , Rfl:     aspirin 81 MG tablet, Take 81 mg by mouth 2 times daily , Disp: , Rfl:      No Known Allergies     Past Medical History:   Diagnosis Date    CVA (cerebral vascular accident) ([facility]     Gastroparesis     H/O asbestosis     History of paroxysmal supraventricular tachycardia 6/11/2019    Neuropathy     Prostate CA (HCC)     Sleep apnea with use of continuous positive airway pressure (CPAP)     TIA (transient ischemic attack)        There are no preventive care reminders to display for this patient. Patient Active Problem List   Diagnosis    Essential hypertension    Type 2 diabetes mellitus (HCC)    Neuropathy due to type 2 diabetes mellitus ([address]    Mixed hyperlipidemia    H/O asbestosis    Prostate CA ([facility] [address]    Lumbar and sacral osteoarthritis    History of paroxysmal supraventricular tachycardia    Need for diphtheria-tetanus-pertussis (Tdap) vaccine    Vertigo    Pulmonary nodules    Abnormal x-ray of pelvis    Unsteady gait when walking    Abnormal radionuclide bone scan    History of prostate cancer    Acute viral syndrome    Hip pain, chronic, right    Chronic right sacroiliac pain    History of CVA (cerebrovascular accident)        Past Surgical History:   Procedure Laterality Date    CERVICAL DISC SURGERY      CHOLECYSTECTOMY      KNEE ARTHROPLASTY      PROSTATE BIOPSY          Family History   Problem Relation Age of Onset    Dementia Mother         Social History     Tobacco Use    Smoking status: Never    Smokeless tobacco: Never   Substance Use Topics    Alcohol use: Not Currently    Drug use: Never        Objective  Vitals:    10/19/22 1305   BP: 102/60   Temp: 97.8 °F (36.6 °C)   Weight: 201 lb (91.2 kg)   Height: 5' 8\" (1.727 m)        Exam:  Physical Exam  Vitals reviewed. Exam conducted with a chaperone present. Constitutional:       General: He is not in acute distress. Appearance: Normal appearance.  He is well-developed. He is not ill-appearing. Comments: Walks with the aid of a cane. He is unsteady   HENT:      Head: Normocephalic and atraumatic. Right Ear: External ear normal.      Left Ear: External ear normal.      Nose: Nose normal.      Mouth/Throat:      Pharynx: No oropharyngeal exudate. Eyes:      General: No scleral icterus. Right eye: No discharge. Left eye: No discharge. Conjunctiva/sclera: Conjunctivae normal.      Pupils: Pupils are equal, round, and reactive to light. Neck:      Thyroid: No thyromegaly. Cardiovascular:      Rate and Rhythm: Normal rate and regular rhythm. Heart sounds: Normal heart sounds. No murmur heard. No friction rub. No gallop. Pulmonary:      Effort: Pulmonary effort is normal. No respiratory distress. Breath sounds: Normal breath sounds. No wheezing or rales. Chest:      Chest wall: No tenderness. Abdominal:      General: Bowel sounds are normal. There is no distension. Palpations: Abdomen is soft. There is no mass. Tenderness: There is no abdominal tenderness. There is no guarding or rebound. Musculoskeletal:         General: No tenderness or deformity. Normal range of motion. Cervical back: Normal range of motion and neck supple. Lymphadenopathy:      Cervical: No cervical adenopathy. Skin:     General: Skin is warm and dry. Coloration: Skin is not pale. Findings: No erythema or rash. Neurological:      Mental Status: He is alert and oriented to person, place, and time. Cranial Nerves: No cranial nerve deficit. Sensory: No sensory deficit. Gait: Gait abnormal.      Deep Tendon Reflexes: Reflexes normal.      Comments: Unsteady   Psychiatric:         Behavior: Behavior normal.         Thought Content: Thought content normal.         Judgment: Judgment normal.        Last labs reviewed.       ASSESSMENT & PLAN :   Problem List          Circulatory    Essential hypertension - Primary     Blood pressures are stable. Continue medications and monitor blood pressures at home. Call office if systolics are over 777 over diastolics over 90. Endocrine    Type 2 diabetes mellitus ([address]       no longer on insulin. He is on Ozempic once a week. I wonder if they took him off his metformin, will check         Relevant Medications    metFORMIN (GLUCOPHAGE) 1000 MG tablet    Semaglutide (OZEMPIC, 1 MG/DOSE, SC)    Neuropathy due to type 2 diabetes mellitus ([address]       continue with increased dose of pregabalin 150 mg twice a day. His neuropathy has worsened in his hands         Relevant Medications    metFORMIN (GLUCOPHAGE) 1000 MG tablet    Semaglutide (OZEMPIC, 1 MG/DOSE, SC)       Genitourinary    Prostate CA (HCC)       PSA is good, follow-up with urology         Relevant Medications    aspirin 81 MG tablet    pregabalin (LYRICA) 75 MG capsule       Other    Unsteady gait when walking       use his walker at all times. [facility] at [facility]. They have put grab bars in his bathroom and are monitoring him for safety at home             Return in about 6 months (around 4/19/2023), or DM, Neuropathy.        Geoff Nova, DO  10/19/2022

## 2022-10-19 NOTE — ASSESSMENT & PLAN NOTE
no longer on insulin. He is on Ozempic once a week.   I wonder if they took him off his metformin, will check

## 2022-10-19 NOTE — ASSESSMENT & PLAN NOTE
continue with increased dose of pregabalin 150 mg twice a day.   His neuropathy has worsened in his hands

## 2023-01-24 ENCOUNTER — OFFICE VISIT (OUTPATIENT)
Dept: PRIMARY CARE CLINIC | Age: 83
End: 2023-01-24
Payer: MEDICARE

## 2023-01-24 VITALS
OXYGEN SATURATION: 95 % | HEIGHT: 68 IN | WEIGHT: 199 LBS | BODY MASS INDEX: 30.16 KG/M2 | SYSTOLIC BLOOD PRESSURE: 118 MMHG | DIASTOLIC BLOOD PRESSURE: 68 MMHG | TEMPERATURE: 97.1 F | HEART RATE: 79 BPM

## 2023-01-24 DIAGNOSIS — M54.50 LUMBAR PAIN: Primary | ICD-10-CM

## 2023-01-24 DIAGNOSIS — E11.40 NEUROPATHY DUE TO TYPE 2 DIABETES MELLITUS (HCC): ICD-10-CM

## 2023-01-24 DIAGNOSIS — I10 ESSENTIAL HYPERTENSION: ICD-10-CM

## 2023-01-24 DIAGNOSIS — C61 PROSTATE CA (HCC): ICD-10-CM

## 2023-01-24 DIAGNOSIS — E11.40 TYPE 2 DIABETES MELLITUS WITH DIABETIC NEUROPATHY, WITH LONG-TERM CURRENT USE OF INSULIN (HCC): ICD-10-CM

## 2023-01-24 DIAGNOSIS — M54.16 LUMBAR RADICULOPATHY: ICD-10-CM

## 2023-01-24 DIAGNOSIS — Z79.4 TYPE 2 DIABETES MELLITUS WITH DIABETIC NEUROPATHY, WITH LONG-TERM CURRENT USE OF INSULIN (HCC): ICD-10-CM

## 2023-01-24 PROBLEM — M62.81 MUSCLE WEAKNESS (GENERALIZED): Status: ACTIVE | Noted: 2023-01-24

## 2023-01-24 PROBLEM — G62.9 NEUROPATHY: Status: ACTIVE | Noted: 2019-09-12

## 2023-01-24 LAB
ALBUMIN SERPL-MCNC: 4 G/DL (ref 3.5–5.2)
ALP BLD-CCNC: 62 U/L (ref 40–129)
ALT SERPL-CCNC: 14 U/L (ref 0–40)
ANION GAP SERPL CALCULATED.3IONS-SCNC: 13 MMOL/L (ref 7–16)
AST SERPL-CCNC: 22 U/L (ref 0–39)
BILIRUB SERPL-MCNC: 1.4 MG/DL (ref 0–1.2)
BUN BLDV-MCNC: 26 MG/DL (ref 6–23)
CALCIUM SERPL-MCNC: 9.4 MG/DL (ref 8.6–10.2)
CHLORIDE BLD-SCNC: 99 MMOL/L (ref 98–107)
CO2: 28 MMOL/L (ref 22–29)
CREAT SERPL-MCNC: 1.4 MG/DL (ref 0.7–1.2)
GFR SERPL CREATININE-BSD FRML MDRD: 51 ML/MIN/1.73
GLUCOSE BLD-MCNC: 136 MG/DL (ref 74–99)
HBA1C MFR BLD: 7.1 % (ref 4–5.6)
POTASSIUM SERPL-SCNC: 4.6 MMOL/L (ref 3.5–5)
SODIUM BLD-SCNC: 140 MMOL/L (ref 132–146)
TOTAL PROTEIN: 6.8 G/DL (ref 6.4–8.3)

## 2023-01-24 PROCEDURE — 1036F TOBACCO NON-USER: CPT | Performed by: INTERNAL MEDICINE

## 2023-01-24 PROCEDURE — G8484 FLU IMMUNIZE NO ADMIN: HCPCS | Performed by: INTERNAL MEDICINE

## 2023-01-24 PROCEDURE — 99214 OFFICE O/P EST MOD 30 MIN: CPT | Performed by: INTERNAL MEDICINE

## 2023-01-24 PROCEDURE — 1123F ACP DISCUSS/DSCN MKR DOCD: CPT | Performed by: INTERNAL MEDICINE

## 2023-01-24 PROCEDURE — G8417 CALC BMI ABV UP PARAM F/U: HCPCS | Performed by: INTERNAL MEDICINE

## 2023-01-24 PROCEDURE — G8427 DOCREV CUR MEDS BY ELIG CLIN: HCPCS | Performed by: INTERNAL MEDICINE

## 2023-01-24 PROCEDURE — 3074F SYST BP LT 130 MM HG: CPT | Performed by: INTERNAL MEDICINE

## 2023-01-24 PROCEDURE — 3078F DIAST BP <80 MM HG: CPT | Performed by: INTERNAL MEDICINE

## 2023-01-24 RX ORDER — METHYLPREDNISOLONE 4 MG/1
TABLET ORAL
Qty: 1 KIT | Refills: 0 | Status: SHIPPED | OUTPATIENT
Start: 2023-01-24 | End: 2023-01-30

## 2023-01-24 RX ORDER — PREGABALIN 150 MG/1
150 CAPSULE ORAL 2 TIMES DAILY
COMMUNITY

## 2023-01-24 ASSESSMENT — ENCOUNTER SYMPTOMS
PHOTOPHOBIA: 0
NAUSEA: 0
VOMITING: 0
BACK PAIN: 1
FACIAL SWELLING: 0
SHORTNESS OF BREATH: 0
CONSTIPATION: 0
RHINORRHEA: 0
WHEEZING: 0
DIARRHEA: 0
SORE THROAT: 0
BLOOD IN STOOL: 0
COLOR CHANGE: 0
ANAL BLEEDING: 0
ABDOMINAL PAIN: 0
EYE ITCHING: 0
STRIDOR: 0
TROUBLE SWALLOWING: 0
EYE DISCHARGE: 0
COUGH: 0
EYE PAIN: 0

## 2023-01-24 ASSESSMENT — PATIENT HEALTH QUESTIONNAIRE - PHQ9
2. FEELING DOWN, DEPRESSED OR HOPELESS: 0
SUM OF ALL RESPONSES TO PHQ9 QUESTIONS 1 & 2: 0
1. LITTLE INTEREST OR PLEASURE IN DOING THINGS: 0
SUM OF ALL RESPONSES TO PHQ QUESTIONS 1-9: 0

## 2023-01-24 NOTE — ASSESSMENT & PLAN NOTE
at goal, continue lisinopril 10 mg daily and monitor his blood pressures.   Check kidney functions with report to [place]

## 2023-01-24 NOTE — ASSESSMENT & PLAN NOTE
no sign of metastatic lesions by nuclear scan, PSA has been good.   He will continue his tamsulosin and follow-up with his urologist

## 2023-01-24 NOTE — ASSESSMENT & PLAN NOTE
continue Tylenol as needed, trial Medrol Dosepak as directed with food, watch for GI upset. Patient informed that the Medrol will bring his blood sugars up but they will come down after he stops it. If it does not come down let me know.

## 2023-01-24 NOTE — ASSESSMENT & PLAN NOTE
Check  right lumbar radiculopathy, x-ray of his lumbar spine. May need an MRI but will leave that up to Dr. Sallie Kitchen. Referral made to him for further evaluation. I discussed getting repeat EMGs of his lower extremities as its been a year since he has had them done but patient refuses to have this done.

## 2023-01-24 NOTE — PROGRESS NOTES
2023    Name: Mandi Frederick : 1940 Sex: male  Age: [de-identified] y.o. Subjective:  Chief Complaint   Patient presents with    Leg Pain        Hypertension  Pertinent negatives include no chest pain, headaches, palpitations or shortness of breath. Diabetes  Pertinent negatives for hypoglycemia include no confusion, dizziness, headaches, nervousness/anxiousness, pallor, seizures, speech difficulty or tremors. Pertinent negatives for diabetes include no chest pain, no fatigue, no polydipsia, no polyphagia, no polyuria and no weakness. Leg Pain   Associated symptoms include numbness. About 3 weeks ago patient noticed pain on his left anterior thigh. It was like a burning sensation and occasionally felt like his left leg would buckle from under him. About 2 days ago he developed pain in his right lower lumbar spine radiating down the lateral aspect of his right thigh up to the knee. He has no history of previous trauma. He had surgery on L4-L5 in the past.  The pain is worse when he first gets up in the morning and is better after he moves around. He has not tried any medication except Tylenol for the pain. In early October he saw Dr. Kike Parada  for follow-up on his prostate cancer. His PSA was down to 5.6. He had a nuclear medicine bone scan because of his history of prostate CA we wanted to make sure that he did not have any metastatic lesions. He did not have any metastatic lesions. There was no loosening of his hardware. He will be following up with a new oncologist in the near future. Reviewed records from the [place]. On 2022 blood work was done. CBC was acceptable, urine showed slightly increased microalbumin in his urine with a microalbumin creatinine ratio 487. 1. Hemoglobin A1c was excellent at 6.7% the rest of his CMP was unremarkable . His cholesterol showed total cholesterol of 121 LDL of 67 HDL 49 triglycerides of 96.   Fasting blood sugar was 109, BUN was 26 liver enzymes were normal estimated GFR was 68  States is [facility] for [facility] to [facility] in person he would like me to order a hemoglobin A1c and a BMP on him and send the results to him.    He has a history of type 2 diabetes mellitus and says his sugars are quite good.    He has early satiety.  He denies any bowel problems, no bloating or constipation, he denies any urinary problems.  His weight has been stable for the last couple of months.    The [facility] pharmacist who is in [facility] discontinued his insulin and continued him on Ozempic 0.5 mg subcu weekly and metformin 5000 mg twice daily.  He has had early satiety and bloating since being started on Ozempic.  His appetite is poor but he has not lost any weight    He [facility] at the [facility] Dr. Payan who increased his pregabalin to 150 mg twice a day    He saw Dr. Taylor on October 6 who did his diabetic examination.    On November 1 he saw  [facility]iat[facility] for his diabetic foot examination    On November 17 he saw Dr. Garcia for his skin check    He has a history of prostate cancer, diabetic neuropathy,  CVA, hypertension, asbestosis and pulmonary nodules along with paroxysmal SVT.    He will be seeing his urologist and will send copies of his PSA and bone scan to him.    He last saw his cardiologist Dr. Marie on April 2022.  He was pleased with his progress.  He will be seeing him once a year.    He had his quadrivalent flu shot and [facility] COVID [facility] last [facility]    Review of Systems   Constitutional:  Negative for appetite change, fatigue and unexpected weight change.   HENT:  Negative for congestion, ear pain, facial swelling, rhinorrhea, sore throat, tinnitus and trouble swallowing.    Eyes:  Negative for photophobia, pain, discharge, itching and visual disturbance.   Respiratory:  Negative for cough, shortness of breath, wheezing and stridor.    Cardiovascular:  Negative for chest pain,  palpitations and leg swelling. Gastrointestinal:  Negative for abdominal pain, anal bleeding, blood in stool, constipation, diarrhea, nausea and vomiting. Endocrine: Negative for cold intolerance, heat intolerance, polydipsia, polyphagia and polyuria. Genitourinary:  Negative for difficulty urinating, dysuria, flank pain, frequency, hematuria and urgency. Musculoskeletal:  Positive for arthralgias, back pain and gait problem. Negative for joint swelling and myalgias. Skin:  Negative for color change, pallor and rash. Allergic/Immunologic: Negative for environmental allergies and food allergies. Neurological:  Positive for numbness. Negative for dizziness, tremors, seizures, syncope, speech difficulty, weakness, light-headedness and headaches. Hematological:  Negative for adenopathy. Does not bruise/bleed easily. Psychiatric/Behavioral:  Negative for agitation, behavioral problems, confusion, sleep disturbance and suicidal ideas. The patient is not nervous/anxious. Current Outpatient Medications:     methylPREDNISolone (MEDROL DOSEPACK) 4 MG tablet, Take by mouth., Disp: 1 kit, Rfl: 0    pregabalin (LYRICA) 150 MG capsule, Take 150 mg by mouth 2 times daily. , Disp: , Rfl:     tamsulosin (FLOMAX) 0.4 MG capsule, Take 0.4 mg by mouth daily, Disp: , Rfl:     ketoconazole (NIZORAL) 2 % cream, APPLY TWICE DAILY TO FACE AND SCALP AS NEEDED, Disp: , Rfl:     Handicap Placard MISC, by Does not apply route Duration 5 years, Disp: 2 each, Rfl: 0    Semaglutide (OZEMPIC, 1 MG/DOSE, SC), 0.05 mg , Disp: , Rfl:     Cyanocobalamin (VITAMIN B 12) 100 MCG LOZG, Take 500 mcg by mouth, Disp: , Rfl:     Omega-3 Fatty Acids (FISH OIL) 1000 MG CAPS, Take 1,200 mg by mouth, Disp: , Rfl:     brimonidine (ALPHAGAN) 0.2 % ophthalmic solution, INSTILL 1 DROP INTO THE RIGHT EYE TWICE A DAY, Disp: , Rfl:     Magnesium Oxide 420 MG TABS, TAKE ONE TABLET BY MOUTH TWICE A DAY, Disp: , Rfl:     Omega-3 Fatty Acids (FISH OIL) 1200 MG CAPS, Take 1,200 mg by mouth, Disp: , Rfl:     metFORMIN (GLUCOPHAGE) 1000 MG tablet, Take 1,000 mg by mouth 2 times daily (with meals), Disp: , Rfl:     Rosuvastatin Calcium 20 MG CPSP, Take by mouth daily, Disp: , Rfl:     Misc.  Devices (ALL-BODY MASSAGE) MISC, Massage as needed for generalized body pain, Disp: 1 each, Rfl: 5    timolol (TIMOPTIC) 0.5 % ophthalmic solution, , Disp: , Rfl:     HM OMEGA-3-6-9 FATTY ACIDS CAPS, Take 1,200 mg by mouth daily, Disp: , Rfl:     lisinopril (PRINIVIL;ZESTRIL) 10 MG tablet, Take 10 mg by mouth daily, Disp: , Rfl:     clopidogrel (PLAVIX) 75 MG tablet, Take 75 mg by mouth daily, Disp: , Rfl:     vitamin D (CHOLECALCIFEROL) 1000 UNIT TABS tablet, Take 1,000 Units by mouth daily, Disp: , Rfl:     Multiple Vitamins-Minerals (MULTI FOR HIM PO), Take by mouth daily, Disp: , Rfl:     aspirin 81 MG tablet, Take 81 mg by mouth 2 times daily , Disp: , Rfl:      No Known Allergies     Past Medical History:   Diagnosis Date    CVA (cerebral vascular accident) ([facility]     Gastroparesis     H/O asbestosis     History of paroxysmal supraventricular tachycardia 6/11/2019    Neuropathy     Prostate CA (HCC)     Sleep apnea with use of continuous positive airway pressure (CPAP)     TIA (transient ischemic attack)        Health Maintenance Due   Topic Date Due    Lipids  09/08/2022          Patient Active Problem List   Diagnosis    Essential hypertension    Type 2 diabetes mellitus (HCC)    Neuropathy due to type 2 diabetes mellitus (HCC)    Mixed hyperlipidemia    H/O asbestosis    Prostate CA ([facility]    Lumbar and sacral osteoarthritis    History of paroxysmal supraventricular tachycardia    Need for diphtheria-tetanus-pertussis (Tdap) vaccine    Vertigo    Pulmonary nodules    Abnormal x-ray of pelvis    Unsteady gait when walking    Abnormal radionuclide bone scan    Hip pain, chronic, right    Chronic right sacroiliac pain    History of CVA (cerebrovascular accident)    Muscle weakness (generalized)    Lumbar radiculopathy    Lumbar pain        Past Surgical History:   Procedure Laterality Date    CERVICAL DISC SURGERY      CHOLECYSTECTOMY      KNEE ARTHROPLASTY      PROSTATE BIOPSY          Family History   Problem Relation Age of Onset    Dementia Mother         Social History     Tobacco Use    Smoking status: Never    Smokeless tobacco: Never   Substance Use Topics    Alcohol use: Not Currently    Drug use: Never        Objective  Vitals:    01/24/23 1102   BP: 118/68   Pulse: 79   Temp: 97.1 °F (36.2 °C)   SpO2: 95%   Weight: 199 lb (90.3 kg)   Height: 5' 8\" (1.727 m)        Exam:  Physical Exam  Vitals reviewed. Exam conducted with a chaperone present. Constitutional:       General: He is not in acute distress. Appearance: Normal appearance. He is well-developed. He is not ill-appearing. Comments: Walks with the aid of a cane. He is unsteady   HENT:      Head: Normocephalic and atraumatic. Right Ear: External ear normal.      Left Ear: External ear normal.      Nose: Nose normal.      Mouth/Throat:      Pharynx: No oropharyngeal exudate. Eyes:      General: No scleral icterus. Right eye: No discharge. Left eye: No discharge. Conjunctiva/sclera: Conjunctivae normal.      Pupils: Pupils are equal, round, and reactive to light. Neck:      Thyroid: No thyromegaly. Cardiovascular:      Rate and Rhythm: Normal rate and regular rhythm. Heart sounds: Normal heart sounds. No murmur heard. No friction rub. No gallop. Pulmonary:      Effort: Pulmonary effort is normal. No respiratory distress. Breath sounds: Normal breath sounds. No wheezing or rales. Chest:      Chest wall: No tenderness. Abdominal:      General: Bowel sounds are normal. There is no distension. Palpations: Abdomen is soft. There is no mass. Tenderness: There is no abdominal tenderness. There is no guarding or rebound.    Musculoskeletal:         General: No tenderness or deformity. Normal range of motion. Cervical back: Normal range of motion and neck supple. Lymphadenopathy:      Cervical: No cervical adenopathy. Skin:     General: Skin is warm and dry. Coloration: Skin is not pale. Findings: No erythema or rash. Neurological:      Mental Status: He is alert and oriented to person, place, and time. Cranial Nerves: No cranial nerve deficit. Sensory: No sensory deficit. Gait: Gait abnormal.      Deep Tendon Reflexes: Reflexes normal.      Comments: Unsteady. I cannot elicit knee jerk and ankle jerks on patient. Decreased sensation from his knee down bilaterally. He has good strength to extension and flexion of his lower extremity from the knee. He is tender to palpation on the right SI joint. Sensory function is good on his lower extremities above the knee   Psychiatric:         Behavior: Behavior normal.         Thought Content: Thought content normal.         Judgment: Judgment normal.        Last labs reviewed. ASSESSMENT & PLAN :   Problem List          Circulatory    Essential hypertension       at goal, continue lisinopril 10 mg daily and monitor his blood pressures. Check kidney functions with report to [place]            Endocrine    Type 2 diabetes mellitus ([facility]       at goal.  [facility] would like [facility] A[facility]c drawn and results sent to them. Relevant Medications    metFORMIN (GLUCOPHAGE) 1000 MG tablet    Semaglutide (OZEMPIC, 1 MG/DOSE, SC)    Other Relevant Orders    Comprehensive Metabolic Panel    Hemoglobin A1C    Neuropathy due to type 2 diabetes mellitus ([facility]       not at goal.  Unsteady gait. Numbness.   Continue pregabalin 150 mg twice daily and follow-up with his physician at the [place]         Relevant Medications    metFORMIN (GLUCOPHAGE) 1000 MG tablet    Semaglutide (OZEMPIC, 1 MG/DOSE, SC)    Other Relevant Orders    External Referral to Pediatric Physical Medicine and Rehab       Nervous and Auditory    Lumbar radiculopathy     Check  right lumbar radiculopathy, x-ray of his lumbar spine.  May need an MRI but will leave that up to Dr. Day.  Referral made to him for further evaluation.  I discussed getting repeat EMGs of his lower extremities as its been a year since he has had them done but patient refuses to have this done.         Relevant Medications    methylPREDNISolone (MEDROL DOSEPACK) 4 MG tablet    pregabalin (LYRICA) 150 MG capsule    Other Relevant Orders    External Referral to Pediatric Physical Medicine and Rehab       Genitourinary    Prostate CA (HCC)       no sign of metastatic lesions by nuclear scan, PSA has been good.  He will continue his tamsulosin and follow-up with his urologist         Relevant Medications    aspirin 81 MG tablet    methylPREDNISolone (MEDROL DOSEPACK) 4 MG tablet    pregabalin (LYRICA) 150 MG capsule       Other    Lumbar pain - Primary       continue Tylenol as needed, trial Medrol Dosepak as directed with food, watch for GI upset.  Patient informed that the Medrol will bring his blood sugars up but they will come down after he stops it.  If it does not come down let me know.         Relevant Medications    aspirin 81 MG tablet    methylPREDNISolone (MEDROL DOSEPACK) 4 MG tablet    Other Relevant Orders    XR LUMBAR SPINE (MIN 4 VIEWS)    External Referral to Pediatric Physical Medicine and Rehab        Return in about 1 month (around 2/24/2023).       Dionne Singh, DO  1/24/2023

## 2023-01-24 NOTE — ASSESSMENT & PLAN NOTE
not at goal.  Unsteady gait. Numbness.   Continue pregabalin 150 mg twice daily and follow-up with his physician at the [place]

## 2023-02-02 ENCOUNTER — TELEPHONE (OUTPATIENT)
Dept: PRIMARY CARE CLINIC | Age: 83
End: 2023-02-02

## 2023-02-02 NOTE — TELEPHONE ENCOUNTER
Pt called in stating that Radha Burris from Dr [facility] office has called him 2 times and left msg to set up an appointment and when he calls them back he is not getting a hold of anybody. He is wanting to know if the nurse or someone here can get a hold of them and schedule an appointment and let him know when it is.

## 2023-02-02 NOTE — TELEPHONE ENCOUNTER
Last Appointment:  1/24/2023  Future Appointments   Date Time Provider You Tracey   4/19/2023  1:00 PM [address] [place] [address]   4/19/2023  1:30 PM [address] [place] [address]      Left message for Dr. Re Queen office to return my call. I also spoke with patient to let him know I have left message and once Dr. Re Queen office returns my call I'll make appointment and call him back. Agreeable.     Electronically signed by Marni Reed LPN on 4/0/3778 at 9:04 PM

## 2023-02-03 NOTE — TELEPHONE ENCOUNTER
Received a phone call from Linda of Dr. Day's office.  Informed Linda patient has been trying to reach them for appointment.  Linda will call again this afternoon.      Patient was informed to expect call from them this afternoon.    Electronically signed by Emma Esparza LPN on 2/3/2023 at 9:52 AM

## 2023-02-03 NOTE — TELEPHONE ENCOUNTER
I have left a second message at Dr. Partida Look office to call regarding scheduling patient.     Electronically signed by Kelsey Luna LPN on 0/9/4000 at 2:69 AM

## 2023-04-12 LAB
AVERAGE GLUCOSE: NORMAL
HBA1C MFR BLD: 7.5 %
TSH SERPL DL<=0.05 MIU/L-ACNC: 1.64 UIU/ML

## 2023-04-19 ENCOUNTER — OFFICE VISIT (OUTPATIENT)
Dept: PRIMARY CARE CLINIC | Age: 83
End: 2023-04-19

## 2023-04-19 VITALS
BODY MASS INDEX: 30.16 KG/M2 | SYSTOLIC BLOOD PRESSURE: 108 MMHG | HEIGHT: 68 IN | HEART RATE: 79 BPM | DIASTOLIC BLOOD PRESSURE: 62 MMHG | OXYGEN SATURATION: 95 % | TEMPERATURE: 97.5 F | WEIGHT: 199 LBS

## 2023-04-19 VITALS
WEIGHT: 199 LBS | BODY MASS INDEX: 30.16 KG/M2 | TEMPERATURE: 97.5 F | DIASTOLIC BLOOD PRESSURE: 62 MMHG | SYSTOLIC BLOOD PRESSURE: 108 MMHG | HEIGHT: 68 IN

## 2023-04-19 DIAGNOSIS — E11.40 NEUROPATHY DUE TO TYPE 2 DIABETES MELLITUS (HCC): Primary | ICD-10-CM

## 2023-04-19 DIAGNOSIS — I10 ESSENTIAL HYPERTENSION: ICD-10-CM

## 2023-04-19 DIAGNOSIS — Z00.00 MEDICARE ANNUAL WELLNESS VISIT, SUBSEQUENT: Primary | ICD-10-CM

## 2023-04-19 DIAGNOSIS — E11.40 TYPE 2 DIABETES MELLITUS WITH DIABETIC NEUROPATHY, WITH LONG-TERM CURRENT USE OF INSULIN (HCC): ICD-10-CM

## 2023-04-19 DIAGNOSIS — E78.2 MIXED HYPERLIPIDEMIA: ICD-10-CM

## 2023-04-19 DIAGNOSIS — Z86.73 HISTORY OF CVA (CEREBROVASCULAR ACCIDENT): ICD-10-CM

## 2023-04-19 DIAGNOSIS — Z79.4 TYPE 2 DIABETES MELLITUS WITH DIABETIC NEUROPATHY, WITH LONG-TERM CURRENT USE OF INSULIN (HCC): ICD-10-CM

## 2023-04-19 DIAGNOSIS — R97.20 HIGH PROSTATE SPECIFIC ANTIGEN (PSA): ICD-10-CM

## 2023-04-19 DIAGNOSIS — R26.81 UNSTEADY GAIT WHEN WALKING: ICD-10-CM

## 2023-04-19 DIAGNOSIS — G62.9 NEUROPATHY: ICD-10-CM

## 2023-04-19 RX ORDER — DULOXETIN HYDROCHLORIDE 30 MG/1
30 CAPSULE, DELAYED RELEASE ORAL DAILY
Qty: 30 CAPSULE | Refills: 5 | Status: SHIPPED | OUTPATIENT
Start: 2023-04-19

## 2023-04-19 SDOH — ECONOMIC STABILITY: FOOD INSECURITY: WITHIN THE PAST 12 MONTHS, YOU WORRIED THAT YOUR FOOD WOULD RUN OUT BEFORE YOU GOT MONEY TO BUY MORE.: NEVER TRUE

## 2023-04-19 SDOH — ECONOMIC STABILITY: HOUSING INSECURITY
IN THE LAST 12 MONTHS, WAS THERE A TIME WHEN YOU DID NOT HAVE A STEADY PLACE TO SLEEP OR SLEPT IN A SHELTER (INCLUDING NOW)?: NO

## 2023-04-19 SDOH — ECONOMIC STABILITY: FOOD INSECURITY: WITHIN THE PAST 12 MONTHS, THE FOOD YOU BOUGHT JUST DIDN'T LAST AND YOU DIDN'T HAVE MONEY TO GET MORE.: NEVER TRUE

## 2023-04-19 SDOH — ECONOMIC STABILITY: INCOME INSECURITY: HOW HARD IS IT FOR YOU TO PAY FOR THE VERY BASICS LIKE FOOD, HOUSING, MEDICAL CARE, AND HEATING?: NOT HARD AT ALL

## 2023-04-19 ASSESSMENT — PATIENT HEALTH QUESTIONNAIRE - PHQ9
SUM OF ALL RESPONSES TO PHQ QUESTIONS 1-9: 6
SUM OF ALL RESPONSES TO PHQ QUESTIONS 1-9: 6
9. THOUGHTS THAT YOU WOULD BE BETTER OFF DEAD, OR OF HURTING YOURSELF: 0
10. IF YOU CHECKED OFF ANY PROBLEMS, HOW DIFFICULT HAVE THESE PROBLEMS MADE IT FOR YOU TO DO YOUR WORK, TAKE CARE OF THINGS AT HOME, OR GET ALONG WITH OTHER PEOPLE: 0
5. POOR APPETITE OR OVEREATING: 0
4. FEELING TIRED OR HAVING LITTLE ENERGY: 3
SUM OF ALL RESPONSES TO PHQ QUESTIONS 1-9: 6
7. TROUBLE CONCENTRATING ON THINGS, SUCH AS READING THE NEWSPAPER OR WATCHING TELEVISION: 0
1. LITTLE INTEREST OR PLEASURE IN DOING THINGS: 3
SUM OF ALL RESPONSES TO PHQ9 QUESTIONS 1 & 2: 3
3. TROUBLE FALLING OR STAYING ASLEEP: 0
2. FEELING DOWN, DEPRESSED OR HOPELESS: 0
8. MOVING OR SPEAKING SO SLOWLY THAT OTHER PEOPLE COULD HAVE NOTICED. OR THE OPPOSITE, BEING SO FIGETY OR RESTLESS THAT YOU HAVE BEEN MOVING AROUND A LOT MORE THAN USUAL: 0
6. FEELING BAD ABOUT YOURSELF - OR THAT YOU ARE A FAILURE OR HAVE LET YOURSELF OR YOUR FAMILY DOWN: 0
SUM OF ALL RESPONSES TO PHQ QUESTIONS 1-9: 6

## 2023-04-19 ASSESSMENT — ENCOUNTER SYMPTOMS
DIARRHEA: 0
COUGH: 0
ABDOMINAL PAIN: 0
SORE THROAT: 0
BLOOD IN STOOL: 0
EYE PAIN: 0
CONSTIPATION: 0
SHORTNESS OF BREATH: 0
NAUSEA: 0
EYE DISCHARGE: 0
WHEEZING: 0
STRIDOR: 0
COLOR CHANGE: 0
ANAL BLEEDING: 0
EYE ITCHING: 0
BACK PAIN: 1
VOMITING: 0
PHOTOPHOBIA: 0
TROUBLE SWALLOWING: 0
FACIAL SWELLING: 0
RHINORRHEA: 0

## 2023-04-19 ASSESSMENT — LIFESTYLE VARIABLES
HOW MANY STANDARD DRINKS CONTAINING ALCOHOL DO YOU HAVE ON A TYPICAL DAY: PATIENT DOES NOT DRINK
HOW OFTEN DO YOU HAVE A DRINK CONTAINING ALCOHOL: NEVER

## 2023-04-19 NOTE — PROGRESS NOTES
takes vitamin B12. Last EMGs were done at least 6 years ago. Prescription given for duloxetine 30 mg. Increase after 2 weeks if he can tolerate it to 60 mg. Let me know and we will send in prescription for the larger dose         Relevant Medications    metFORMIN (GLUCOPHAGE) 1000 MG tablet    Semaglutide (OZEMPIC, 1 MG/DOSE, SC)    Other Relevant Orders    JOSH Andino, Neurology, [facility] Car[facility]       Other    Mixed hyperlipidemia       at goal.  Continue diet and rosuvastatin 20 mg a day         Relevant Medications    lisinopril (PRINIVIL;ZESTRIL) 10 MG tablet    aspirin 81 MG tablet    Rosuvastatin Calcium 20 MG CPSP    Unsteady gait when walking    Relevant Orders    JOSH Andino, Neurology, [facility] Caruso    History of CVA (cerebrovascular accident)       continue clopidogrel 75 mg a day and aspirin 81 mg a day         High prostate specific antigen (PSA)       not at goal.  Follow-up with urologist             Return in about 3 months (around 7/19/2023), or DM, Neuropathy.        Jenna Trimble,   4/19/2023

## 2023-04-19 NOTE — ASSESSMENT & PLAN NOTE
not at goal.  This is being managed by staff from the [place]. He is to continue monitoring his blood sugars as before. He is off insulin.   He is on Ozempic subcu weekly metformin 1000 mg twice daily

## 2023-04-19 NOTE — PROGRESS NOTES
Medicare Annual Wellness Visit    Joe Anderson is here for Medicare AWV    Assessment & Plan    Recommendations for Preventive Services Due: see orders and patient instructions/AVS.  Recommended screening schedule for the next 5-10 years is provided to the patient in written form: see Patient Instructions/AVS.     No follow-ups on file. Subjective       Patient's complete Health Risk Assessment and screening values have been reviewed and are found in Flowsheets. Positive Risk Factor Screenings with Interventions:    Fall Risk:        Interventions:    Referred to Neurology, continue using  Rollator all the time     Depression:  PHQ-2 Score: 3  PHQ-9 Total Score: 6    Interpretation:   1-4 = minimal  5-9 = mild  10-14 = moderate  15-19 = moderately severe  20-27 = severe    Interventions:  Patient comments: start Duloxetine 30 mg daily            Weight and Activity:  Physical Activity: Sufficiently Active    Days of Exercise per Week: 7 days    Minutes of Exercise per Session: 70 min     On average, how many days per week do you engage in moderate to strenuous exercise (like a brisk walk)?: 7 days  Have you lost any weight without trying in the past 3 months?: No  Body Mass Index: (!) 30.26    Obesity Interventions:  See AVS for additional education material              ADL's:   Patient reports needing help with:  Select all that apply: (!) Bathing, Walking/Balance  Select all that apply: [employer], Housekeeping, Banking/Finances, Shopping, Food Preparation, Transportation    Interventions:  Patient comments: wife helps with all of the above        Has Living will and [facility] will bring copy            Objective   Vitals:    04/19/23 1301   BP: 108/62   Pulse: 79   Temp: 97.5 °F (36.4 °C)   SpO2: 95%   Weight: 199 lb (90.3 kg)   Height: 5' 8\" (1.727 m)      Body mass index is 30.26 kg/m². No Known Allergies  Prior to Visit Medications    Medication Sig Taking?  Authorizing Provider

## 2023-04-19 NOTE — ASSESSMENT & PLAN NOTE
not at goal.  Physician at [facility] has increased his Lyrica to 200 mg twice daily. We discussed starting him on duloxetine starting at a low dose 30 mg and increasing as necessary. He is willing to try this. In the meantime we will refer him to [address] [place] [address] neurology to see if there is anything else they can add. It was also thought that his neuropathy was secondary to diabetes but I do not know if it was ever looked into as far as any other causes. He takes vitamin B12. Last EMGs were done at least 6 years ago. Prescription given for duloxetine 30 mg. Increase after 2 weeks if he can tolerate it to 60 mg.   Let me know and we will send in prescription for the larger dose

## 2023-04-19 NOTE — PATIENT INSTRUCTIONS
have questions about how to make changes to your eating or exercise habits, ask your doctor about seeing a registered dietitian or an exercise specialist.  It can be a big challenge to lose weight. But you don't have to make huge changes at once. Make small changes, and stick with them. When those changes become habit, add a few more changes. If you don't think you're ready to make changes right now, try to pick a date in the future. Make an appointment to see your doctor to discuss whether the time is right for you to start a plan. Follow-up care is a key part of your treatment and safety. Be sure to make and go to all appointments, and call your doctor if you are having problems. It's also a good idea to know your test results and keep a list of the medicines you take. How can you care for yourself at home? Set realistic goals. Many people expect to lose much more weight than is likely. A weight loss of 5% to 10% of your body weight may be enough to improve your health. Get family and friends involved to provide support. Talk to them about why you are trying to lose weight, and ask them to help. They can help by participating in exercise and having meals with you, even if they may be eating something different. Find what works best for you. If you do not have time or do not like to cook, a program that offers meal replacement bars or shakes may be better for you. Or if you like to prepare meals, finding a plan that includes daily menus and recipes may be best.  Ask your doctor about other health professionals who can help you achieve your weight loss goals. A dietitian can help you make healthy changes in your diet. An exercise specialist or  can help you develop a safe and effective exercise program.  A counselor or psychiatrist can help you cope with issues such as depression, anxiety, or family problems that can make it hard to focus on weight loss.   Consider joining a support group for

## 2023-04-21 ENCOUNTER — TELEPHONE (OUTPATIENT)
Dept: PRIMARY CARE CLINIC | Age: 83
End: 2023-04-21

## 2023-04-27 ENCOUNTER — TELEPHONE (OUTPATIENT)
Dept: PRIMARY CARE CLINIC | Age: 83
End: 2023-04-27

## 2023-05-04 ENCOUNTER — OFFICE VISIT (OUTPATIENT)
Dept: PRIMARY CARE CLINIC | Age: 83
End: 2023-05-04

## 2023-05-04 VITALS
OXYGEN SATURATION: 90 % | TEMPERATURE: 97.5 F | BODY MASS INDEX: 30.26 KG/M2 | DIASTOLIC BLOOD PRESSURE: 62 MMHG | HEART RATE: 60 BPM | SYSTOLIC BLOOD PRESSURE: 112 MMHG | HEIGHT: 68 IN

## 2023-05-04 DIAGNOSIS — Z09 HOSPITAL DISCHARGE FOLLOW-UP: Primary | ICD-10-CM

## 2023-05-04 DIAGNOSIS — M25.561 ACUTE PAIN OF RIGHT KNEE: ICD-10-CM

## 2023-05-04 DIAGNOSIS — S83.411D SPRAIN OF MEDIAL COLLATERAL LIGAMENT OF RIGHT KNEE, SUBSEQUENT ENCOUNTER: ICD-10-CM

## 2023-05-04 PROBLEM — S83.411A SPRAIN OF MEDIAL COLLATERAL LIGAMENT OF RIGHT KNEE: Status: ACTIVE | Noted: 2023-05-04

## 2023-05-04 PROBLEM — S83.419A KNEE MCL SPRAIN: Status: ACTIVE | Noted: 2023-05-04
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
## 2023-05-04 ASSESSMENT — ENCOUNTER SYMPTOMS
ANAL BLEEDING: 0
EYE DISCHARGE: 0
FACIAL SWELLING: 0
SHORTNESS OF BREATH: 0
EYE ITCHING: 0
WHEEZING: 0
BLOOD IN STOOL: 0
RHINORRHEA: 0
EYE PAIN: 0
NAUSEA: 0
ABDOMINAL PAIN: 0
VOMITING: 0
COUGH: 0
SORE THROAT: 0
BACK PAIN: 1
TROUBLE SWALLOWING: 0
STRIDOR: 0
COLOR CHANGE: 0
CONSTIPATION: 0
PHOTOPHOBIA: 0
DIARRHEA: 0

## 2023-05-04 NOTE — ASSESSMENT & PLAN NOTE
follow treatment as outlined by ED physician. 6 avoid weightbearing on his left leg. Tylenol for pain. Follow-up with orthopedic surgeon this coming Monday.

## 2023-05-04 NOTE — ASSESSMENT & PLAN NOTE
ED reports reviewed. EKG reviewed by me. Did not show any acute abnormalities. Chest x-ray and knee x-ray reports reviewed. Medication reconciliation done.   There were no new medications given

## 2023-05-04 NOTE — PROGRESS NOTES
General: He is not in acute distress. Appearance: Normal appearance. He is well-developed. He is not ill-appearing. Comments: Patient is in a wheelchair   HENT:      Head: Normocephalic and atraumatic. Right Ear: External ear normal.      Left Ear: External ear normal.      Nose: Nose normal.      Mouth/Throat:      Pharynx: No oropharyngeal exudate. Eyes:      General: No scleral icterus. Right eye: No discharge. Left eye: No discharge. Conjunctiva/sclera: Conjunctivae normal.      Pupils: Pupils are equal, round, and reactive to light. Neck:      Thyroid: No thyromegaly. Cardiovascular:      Rate and Rhythm: Normal rate and regular rhythm. Heart sounds: Normal heart sounds. No murmur heard. No friction rub. No gallop. Pulmonary:      Effort: Pulmonary effort is normal. No respiratory distress. Breath sounds: Normal breath sounds. No wheezing or rales. Chest:      Chest wall: No tenderness. Abdominal:      General: Bowel sounds are normal. There is no distension. Palpations: Abdomen is soft. There is no mass. Tenderness: There is no abdominal tenderness. There is no guarding or rebound. Musculoskeletal:         General: Normal range of motion. Cervical back: Normal range of motion and neck supple. Comments: Swelling of the medial aspect of his left knee. To palpation. Patella tracks normally. He has good range of motion to flexion of his left lower extremity however he cannot completely extend his left leg   Lymphadenopathy:      Cervical: No cervical adenopathy. Skin:     General: Skin is warm and dry. Coloration: Skin is not pale. Findings: No erythema or rash. Neurological:      Mental Status: He is alert and oriented to person, place, and time. Cranial Nerves: No cranial nerve deficit. Sensory: No sensory deficit.       Deep Tendon Reflexes: Reflexes normal.   Psychiatric:         Behavior: Behavior

## 2023-05-19 PROBLEM — Z00.00 MEDICARE ANNUAL WELLNESS VISIT, SUBSEQUENT: Status: RESOLVED | Noted: 2023-04-19 | Resolved: 2023-05-19

## 2023-06-03 PROBLEM — Z09 HOSPITAL DISCHARGE FOLLOW-UP: Status: RESOLVED | Noted: 2023-05-04 | Resolved: 2023-06-03
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
## 2023-07-19 ENCOUNTER — OFFICE VISIT (OUTPATIENT)
Dept: PRIMARY CARE CLINIC | Age: 83
End: 2023-07-19
Payer: MEDICARE

## 2023-07-19 VITALS
HEIGHT: 68 IN | BODY MASS INDEX: 31.67 KG/M2 | DIASTOLIC BLOOD PRESSURE: 72 MMHG | TEMPERATURE: 97 F | WEIGHT: 209 LBS | SYSTOLIC BLOOD PRESSURE: 138 MMHG | HEART RATE: 78 BPM

## 2023-07-19 DIAGNOSIS — R26.81 UNSTEADY GAIT WHEN WALKING: ICD-10-CM

## 2023-07-19 DIAGNOSIS — E78.2 MIXED HYPERLIPIDEMIA: ICD-10-CM

## 2023-07-19 DIAGNOSIS — E11.40 NEUROPATHY DUE TO TYPE 2 DIABETES MELLITUS (HCC): ICD-10-CM

## 2023-07-19 DIAGNOSIS — M62.81 MUSCLE WEAKNESS (GENERALIZED): ICD-10-CM

## 2023-07-19 DIAGNOSIS — E11.40 TYPE 2 DIABETES MELLITUS WITH DIABETIC NEUROPATHY, WITH LONG-TERM CURRENT USE OF INSULIN (HCC): ICD-10-CM

## 2023-07-19 DIAGNOSIS — I10 ESSENTIAL HYPERTENSION: ICD-10-CM

## 2023-07-19 DIAGNOSIS — Z85.46 HISTORY OF PROSTATE CANCER: ICD-10-CM

## 2023-07-19 DIAGNOSIS — Z79.4 TYPE 2 DIABETES MELLITUS WITH DIABETIC NEUROPATHY, WITH LONG-TERM CURRENT USE OF INSULIN (HCC): ICD-10-CM

## 2023-07-19 DIAGNOSIS — I10 ESSENTIAL HYPERTENSION: Primary | ICD-10-CM

## 2023-07-19 PROBLEM — K76.9 DISORDER OF LIVER: Status: ACTIVE | Noted: 2023-07-19

## 2023-07-19 LAB
ALBUMIN SERPL-MCNC: 4 G/DL (ref 3.5–5.2)
ALP BLD-CCNC: 56 U/L (ref 40–129)
ALT SERPL-CCNC: 21 U/L (ref 0–40)
ANION GAP SERPL CALCULATED.3IONS-SCNC: 14 MMOL/L (ref 7–16)
AST SERPL-CCNC: 26 U/L (ref 0–39)
BILIRUB SERPL-MCNC: 0.9 MG/DL (ref 0–1.2)
BUN BLDV-MCNC: 20 MG/DL (ref 6–23)
CALCIUM SERPL-MCNC: 9.5 MG/DL (ref 8.6–10.2)
CHLORIDE BLD-SCNC: 101 MMOL/L (ref 98–107)
CO2: 25 MMOL/L (ref 22–29)
CREAT SERPL-MCNC: 1.1 MG/DL (ref 0.7–1.2)
GFR SERPL CREATININE-BSD FRML MDRD: >60 ML/MIN/1.73M2
GLUCOSE BLD-MCNC: 301 MG/DL (ref 74–99)
HBA1C MFR BLD: 7.7 % (ref 4–5.6)
POTASSIUM SERPL-SCNC: 5 MMOL/L (ref 3.5–5)
PROSTATE SPECIFIC ANTIGEN: 7.59 NG/ML (ref 0–4)
SODIUM BLD-SCNC: 140 MMOL/L (ref 132–146)
TOTAL PROTEIN: 6.4 G/DL (ref 6.4–8.3)

## 2023-07-19 PROCEDURE — 1036F TOBACCO NON-USER: CPT | Performed by: INTERNAL MEDICINE

## 2023-07-19 PROCEDURE — 99214 OFFICE O/P EST MOD 30 MIN: CPT | Performed by: INTERNAL MEDICINE

## 2023-07-19 PROCEDURE — G8427 DOCREV CUR MEDS BY ELIG CLIN: HCPCS | Performed by: INTERNAL MEDICINE

## 2023-07-19 PROCEDURE — 1123F ACP DISCUSS/DSCN MKR DOCD: CPT | Performed by: INTERNAL MEDICINE

## 2023-07-19 PROCEDURE — 3078F DIAST BP <80 MM HG: CPT | Performed by: INTERNAL MEDICINE

## 2023-07-19 PROCEDURE — 3051F HG A1C>EQUAL 7.0%<8.0%: CPT | Performed by: INTERNAL MEDICINE

## 2023-07-19 PROCEDURE — 3075F SYST BP GE 130 - 139MM HG: CPT | Performed by: INTERNAL MEDICINE

## 2023-07-19 PROCEDURE — G8417 CALC BMI ABV UP PARAM F/U: HCPCS | Performed by: INTERNAL MEDICINE

## 2023-07-19 ASSESSMENT — ENCOUNTER SYMPTOMS
WHEEZING: 0
ANAL BLEEDING: 0
EYE DISCHARGE: 0
EYE PAIN: 0
SHORTNESS OF BREATH: 0
FACIAL SWELLING: 0
CONSTIPATION: 0
DIARRHEA: 0
EYE ITCHING: 0
BLOOD IN STOOL: 0
COLOR CHANGE: 0
PHOTOPHOBIA: 0
VOMITING: 0
SORE THROAT: 0
RHINORRHEA: 0
NAUSEA: 0
STRIDOR: 0
COUGH: 0
BACK PAIN: 1
TROUBLE SWALLOWING: 0
ABDOMINAL PAIN: 0

## 2023-07-19 NOTE — PROGRESS NOTES
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 Follow-up with [place] doctor. Trial of home PT  Continue Cymbalta 30 mg daily           Relevant Medications    metFORMIN (GLUCOPHAGE) 1000 MG tablet    Semaglutide (OZEMPIC, 1 MG/DOSE, SC)    Other Relevant Orders    External Referral To Physical Therapy       Other    Muscle weakness (generalized)    Relevant Orders    External Referral To Physical Therapy    Mixed hyperlipidemia       continue his  rosuvastatin 20 mg a day and watch his diet. Check fasting lipid profile           Relevant Medications    lisinopril (PRINIVIL;ZESTRIL) 10 MG tablet    aspirin 81 MG tablet    Rosuvastatin Calcium 20 MG CPSP    Other Relevant Orders    Comprehensive Metabolic Panel    Unsteady gait when walking       referred to [facility] physical therapy for home PT           Relevant Orders    External Referral To Physical Therapy    History of prostate cancer     Check diagnostic PSA            Relevant Orders    PSA, Diagnostic        Return in about 3 months (around 10/19/2023), or DM, HL be fasting.        Mariel Nunes DO  7/19/2023

## 2023-07-19 NOTE — ASSESSMENT & PLAN NOTE
not at goal.  Continue Ozempic and metformin.   He follows up with [address] pharmacist for his diabetes

## 2023-07-19 NOTE — ASSESSMENT & PLAN NOTE
at goal.  Continue lisinopril 10 mg a day and be careful when he stands up so that this blood pressure does not drop

## 2023-07-19 NOTE — ASSESSMENT & PLAN NOTE
continue pregabalin 200 mg twice daily. Follow-up with [place] doctor.   Trial of home PT  Continue Cymbalta 30 mg daily

## 2023-12-26 ENCOUNTER — TELEPHONE (OUTPATIENT)
Dept: PRIMARY CARE CLINIC | Age: 83
End: 2023-12-26

## 2023-12-26 ENCOUNTER — OFFICE VISIT (OUTPATIENT)
Dept: PRIMARY CARE CLINIC | Age: 83
End: 2023-12-26
Payer: MEDICARE

## 2023-12-26 VITALS
OXYGEN SATURATION: 96 % | DIASTOLIC BLOOD PRESSURE: 52 MMHG | HEIGHT: 68 IN | WEIGHT: 200 LBS | HEART RATE: 80 BPM | SYSTOLIC BLOOD PRESSURE: 110 MMHG | BODY MASS INDEX: 30.31 KG/M2 | TEMPERATURE: 97.1 F

## 2023-12-26 DIAGNOSIS — E78.2 MIXED HYPERLIPIDEMIA: ICD-10-CM

## 2023-12-26 DIAGNOSIS — E11.40 NEUROPATHY DUE TO TYPE 2 DIABETES MELLITUS (HCC): ICD-10-CM

## 2023-12-26 DIAGNOSIS — E11.40 TYPE 2 DIABETES MELLITUS WITH DIABETIC NEUROPATHY, WITH LONG-TERM CURRENT USE OF INSULIN (HCC): ICD-10-CM

## 2023-12-26 DIAGNOSIS — M62.81 MUSCLE WEAKNESS (GENERALIZED): ICD-10-CM

## 2023-12-26 DIAGNOSIS — I10 ESSENTIAL HYPERTENSION: Primary | ICD-10-CM

## 2023-12-26 DIAGNOSIS — Z79.4 TYPE 2 DIABETES MELLITUS WITH DIABETIC NEUROPATHY, WITH LONG-TERM CURRENT USE OF INSULIN (HCC): ICD-10-CM

## 2023-12-26 LAB
CHOLESTEROL: 118 MG/DL
HDLC SERPL-MCNC: 48 MG/DL
LDL CHOLESTEROL: 48 MG/DL
TRIGL SERPL-MCNC: 110 MG/DL
VLDLC SERPL CALC-MCNC: 22 MG/DL

## 2023-12-26 PROCEDURE — G8484 FLU IMMUNIZE NO ADMIN: HCPCS | Performed by: INTERNAL MEDICINE

## 2023-12-26 PROCEDURE — G8427 DOCREV CUR MEDS BY ELIG CLIN: HCPCS | Performed by: INTERNAL MEDICINE

## 2023-12-26 PROCEDURE — 1123F ACP DISCUSS/DSCN MKR DOCD: CPT | Performed by: INTERNAL MEDICINE

## 2023-12-26 PROCEDURE — 99214 OFFICE O/P EST MOD 30 MIN: CPT | Performed by: INTERNAL MEDICINE

## 2023-12-26 PROCEDURE — 1036F TOBACCO NON-USER: CPT | Performed by: INTERNAL MEDICINE

## 2023-12-26 PROCEDURE — 3074F SYST BP LT 130 MM HG: CPT | Performed by: INTERNAL MEDICINE

## 2023-12-26 PROCEDURE — G8417 CALC BMI ABV UP PARAM F/U: HCPCS | Performed by: INTERNAL MEDICINE

## 2023-12-26 PROCEDURE — 3078F DIAST BP <80 MM HG: CPT | Performed by: INTERNAL MEDICINE

## 2023-12-26 PROCEDURE — 3051F HG A1C>EQUAL 7.0%<8.0%: CPT | Performed by: INTERNAL MEDICINE

## 2023-12-26 NOTE — ASSESSMENT & PLAN NOTE
at goal.  Continue lisinopril 10 mg a day. Monitor blood pressures.   Get results of lab work from [place] from late fall 2023

## 2023-12-26 NOTE — ASSESSMENT & PLAN NOTE
not at goal.  Continue rosuvastatin 20 mg a day and diet.   Check fasting lipid profile and adjust rosuvastatin if necessary

## 2024-01-03 DIAGNOSIS — R53.1 WEAKNESS: Primary | ICD-10-CM

## 2024-01-03 DIAGNOSIS — R26.81 UNSTEADY GAIT: ICD-10-CM

## 2024-01-03 DIAGNOSIS — G62.9 NEUROPATHY: ICD-10-CM

## 2024-07-12 ENCOUNTER — OFFICE VISIT (OUTPATIENT)
Dept: FAMILY MEDICINE CLINIC | Age: 84
End: 2024-07-12

## 2024-07-12 VITALS
OXYGEN SATURATION: 97 % | WEIGHT: 196 LBS | BODY MASS INDEX: 29.7 KG/M2 | HEART RATE: 78 BPM | SYSTOLIC BLOOD PRESSURE: 84 MMHG | HEIGHT: 68 IN | RESPIRATION RATE: 16 BRPM | DIASTOLIC BLOOD PRESSURE: 40 MMHG | TEMPERATURE: 97 F

## 2024-07-12 DIAGNOSIS — J02.9 SORE THROAT: ICD-10-CM

## 2024-07-12 DIAGNOSIS — R68.89 FLU-LIKE SYMPTOMS: Primary | ICD-10-CM

## 2024-07-12 DIAGNOSIS — I95.9 HYPOTENSION, UNSPECIFIED HYPOTENSION TYPE: ICD-10-CM

## 2024-07-12 LAB
INFLUENZA A ANTIBODY: NORMAL
INFLUENZA B ANTIBODY: NORMAL
Lab: NORMAL
PERFORMING INSTRUMENT: NORMAL
QC PASS/FAIL: NORMAL
S PYO AG THROAT QL: NORMAL
SARS-COV-2, POC: NORMAL

## 2024-07-12 NOTE — PROGRESS NOTES
24  Aubrey Up : 1940 Sex: male  Age 83 y.o.    Subjective:  Chief Complaint   Patient presents with    Cough       HPI:   Aubrey Up , 83 y.o. male presents to the clinic for evaluation of cough x 5 days. The patient also reports diarrhea, fever (max 101.3 F), sore throat, vomiting, shortness of breath. The patient has taken Tylenol for symptoms. The patient reports worsening symptoms over time. The patient reports strep ill exposure yesterday. The patient denies headache, sinus congestion, rash. The patient also denies chest pain, abdominal pain, wheezing.    ROS:   Unless otherwise stated in this report the patient's positive and negative responses for review of systems for constitutional, eyes, ENT, cardiovascular, respiratory, gastrointestinal, neurological, , musculoskeletal, and integument systems and related systems to the presenting problem are either stated in the history of present illness or were not pertinent or were negative for the symptoms and/or complaints related to the presenting medical problem.  Positives and pertinent negatives as per HPI.  All others reviewed and are negative.      PMH:     Past Medical History:   Diagnosis Date    CVA (cerebral vascular accident) (HCC)     Gastroparesis     H/O asbestosis     History of paroxysmal supraventricular tachycardia 2019    Neuropathy     Prostate CA (HCC)     Sleep apnea with use of continuous positive airway pressure (CPAP)     TIA (transient ischemic attack)        Past Surgical History:   Procedure Laterality Date    CERVICAL DISC SURGERY      CHOLECYSTECTOMY      KNEE ARTHROPLASTY      PROSTATE BIOPSY         Family History   Problem Relation Age of Onset    Dementia Mother        Medications:     Current Outpatient Medications:     mupirocin (BACTROBAN) 2 % ointment, Apply to affected area twice daily as needed, Disp: , Rfl:     DULoxetine (CYMBALTA) 30 MG extended release capsule, Take 1 capsule by mouth

## 2024-08-02 ENCOUNTER — PREP FOR PROCEDURE (OUTPATIENT)
Dept: SURGERY | Age: 84
End: 2024-08-02

## 2024-08-02 RX ORDER — SODIUM CHLORIDE 9 MG/ML
INJECTION, SOLUTION INTRAVENOUS
Status: CANCELLED | OUTPATIENT
Start: 2024-08-02 | End: 2024-08-02

## 2024-08-05 ENCOUNTER — ANESTHESIA EVENT (OUTPATIENT)
Dept: ENDOSCOPY | Age: 84
End: 2024-08-05

## 2024-08-05 ASSESSMENT — LIFESTYLE VARIABLES: SMOKING_STATUS: 0

## 2024-08-05 NOTE — ANESTHESIA PRE PROCEDURE
Department of Anesthesiology  Preprocedure Note       Name:  Aubrey Up   Age:  83 y.o.  :  1940                                          MRN:  [id number]         Date:  2024      Surgeon: Surgeon(s):  Daryl Stokes MD    Procedure: Procedure(s):  ESOPHAGOGASTRODUODENOSCOPY PERCUTANEOUS ENDOSCOPIC GASTROSTOMY TUBE PLACEMENT   ++FACILITY++    Medications prior to admission:   Prior to Admission medications    Medication Sig Start Date End Date Taking? Authorizing Provider   acetaminophen (TYLENOL) 325 MG tablet 2 tablets by Per NG tube route every 6 hours as needed for Pain or Fever   Yes Selene Quinteros MD   atorvastatin (LIPITOR) 20 MG tablet 1 tablet by Per NG tube route daily   Yes Selene Quinteros MD   mupirocin (BACTROBAN) 2 % ointment Apply to affected area twice daily as needed 10/3/23   Selene Quinteros MD   DULoxetine (CYMBALTA) 30 MG extended release capsule Take 1 capsule by mouth daily 23   Dionne Singh DO   pregabalin (LYRICA) 150 MG capsule Take 1 capsule by mouth 2 times daily. Patient taking 200 mg BID    Selene Quinteros MD   tamsulosin (FLOMAX) 0.4 MG capsule Take 1 capsule by mouth daily    Selene Quinteros MD   ketoconazole (NIZORAL) 2 % cream APPLY TWICE DAILY TO FACE AND SCALP AS NEEDED 22   Selene Quinteros MD   Handicap Placard MISC by Does not apply route Duration 5 years 3/16/22   Dionne Singh DO   Semaglutide (OZEMPIC, 1 MG/DOSE, SC) 0.05 mg  21   Selene Quinteros MD   Cyanocobalamin (VITAMIN B 12) 100 MCG LOZG Take 500 mcg by mouth    Selene Quinteros MD   Omega-3 Fatty Acids (FISH OIL) 1000 MG CAPS Take 1,200 mg by mouth    Selene Quinteros MD   brimonidine (ALPHAGAN) 0.2 % ophthalmic solution INSTILL 1 DROP INTO THE RIGHT EYE TWICE A DAY 3/3/21   Selene Quinteros MD   Magnesium Oxide 420 MG TABS TAKE ONE TABLET BY MOUTH TWICE A DAY 20   Selene Quinteros MD   Omega-3 Fatty Acids (FISH

## 2024-08-06 ENCOUNTER — ANESTHESIA (OUTPATIENT)
Dept: ENDOSCOPY | Age: 84
End: 2024-08-06

## 2024-08-06 PROCEDURE — 2580000003 HC RX 258: Performed by: SURGERY

## 2024-08-06 PROCEDURE — 6360000002 HC RX W HCPCS: Performed by: NURSE ANESTHETIST, CERTIFIED REGISTERED

## 2024-08-06 PROCEDURE — 6360000002 HC RX W HCPCS: Performed by: SURGERY

## 2024-08-06 RX ORDER — PROPOFOL 10 MG/ML
INJECTION, EMULSION INTRAVENOUS PRN
Status: DISCONTINUED | OUTPATIENT
Start: 2024-08-06 | End: 2024-08-06 | Stop reason: SDUPTHER

## 2024-08-06 RX ADMIN — SODIUM CHLORIDE: 9 INJECTION, SOLUTION INTRAVENOUS at 07:58

## 2024-08-06 RX ADMIN — PROPOFOL 50 MG: 10 INJECTION, EMULSION INTRAVENOUS at 08:04

## 2024-08-06 RX ADMIN — WATER 2000 MG: 1 INJECTION INTRAMUSCULAR; INTRAVENOUS; SUBCUTANEOUS at 08:00

## 2024-08-06 RX ADMIN — PROPOFOL 20 MG: 10 INJECTION, EMULSION INTRAVENOUS at 08:06

## 2024-08-06 NOTE — ANESTHESIA POSTPROCEDURE EVALUATION
Department of Anesthesiology  Postprocedure Note    Patient: Aubrey Up  MRN: [id number]  YOB: 1940  Date of evaluation: 8/6/2024    Procedure Summary       Date: 08/06/24 Room / Location: [place] [address] / [facility]    Anesthesia Start: 0758 Anesthesia Stop: 0812    Procedure: ESOPHAGOGASTRODUODENOSCOPY PERCUTANEOUS ENDOSCOPIC GASTROSTOMY TUBE PLACEMENT   ++FACILITY++ Diagnosis:       Dysphagia, unspecified type      (Dysphagia, unspecified type [R13.10])    Surgeons: Daryl Stokes MD Responsible Provider: Amari Caban DO    Anesthesia Type: MAC ASA Status: 4            Anesthesia Type: No value filed.    Johanna Phase I:      Johanna Phase II:      Anesthesia Post Evaluation    Patient location during evaluation: bedside  Patient participation: complete - patient participated  Level of consciousness: awake  Airway patency: patent  Nausea & Vomiting: no nausea and no vomiting  Cardiovascular status: hemodynamically stable and blood pressure returned to baseline  Respiratory status: nasal cannula  Hydration status: euvolemic  Pain management: satisfactory to patient        No notable events documented.

## 2024-10-16 ENCOUNTER — APPOINTMENT (OUTPATIENT)
Dept: GENERAL RADIOLOGY | Age: 84
DRG: 064 | End: 2024-10-16
Attending: INTERNAL MEDICINE
Payer: MEDICARE

## 2024-10-16 ENCOUNTER — HOSPITAL ENCOUNTER (INPATIENT)
Age: 84
LOS: 7 days | Discharge: ANOTHER HOSPITAL | DRG: 064 | End: 2024-10-23
Attending: INTERNAL MEDICINE | Admitting: INTERNAL MEDICINE
Payer: MEDICARE

## 2024-10-16 DIAGNOSIS — I24.9 ACUTE CORONARY SYNDROME (HCC): Primary | ICD-10-CM

## 2024-10-16 DIAGNOSIS — I63.9 ACUTE ISCHEMIC STROKE (HCC): ICD-10-CM

## 2024-10-16 DIAGNOSIS — I65.22 CAROTID STENOSIS, ASYMPTOMATIC, LEFT: ICD-10-CM

## 2024-10-16 DIAGNOSIS — R07.9 CHEST PAIN, UNSPECIFIED TYPE: ICD-10-CM

## 2024-10-16 DIAGNOSIS — I50.20 SYSTOLIC CONGESTIVE HEART FAILURE, UNSPECIFIED HF CHRONICITY (HCC): ICD-10-CM

## 2024-10-16 PROBLEM — I21.4 NSTEMI (NON-ST ELEVATED MYOCARDIAL INFARCTION) (HCC): Status: ACTIVE | Noted: 2024-10-16

## 2024-10-16 LAB
AADO2: 116.8 MMHG
AADO2: 99.8 MMHG
AMMONIA PLAS-SCNC: 11 UMOL/L (ref 16–60)
ANION GAP SERPL CALCULATED.3IONS-SCNC: 8 MMOL/L (ref 7–16)
ANION GAP SERPL CALCULATED.3IONS-SCNC: 9 MMOL/L (ref 7–16)
B PARAP IS1001 DNA NPH QL NAA+NON-PROBE: NOT DETECTED
B PERT DNA SPEC QL NAA+PROBE: NOT DETECTED
B.E.: -2.5 MMOL/L (ref -3–3)
B.E.: -3 MMOL/L (ref -3–3)
B.E.: -4.5 MMOL/L (ref -3–3)
B.E.: -6 MMOL/L (ref -3–3)
BACTERIA URNS QL MICRO: ABNORMAL
BILIRUB UR QL STRIP: NEGATIVE
BUN SERPL-MCNC: 40 MG/DL (ref 6–23)
BUN SERPL-MCNC: 43 MG/DL (ref 6–23)
C PNEUM DNA NPH QL NAA+NON-PROBE: NOT DETECTED
CALCIUM SERPL-MCNC: 8.4 MG/DL (ref 8.6–10.2)
CALCIUM SERPL-MCNC: 8.6 MG/DL (ref 8.6–10.2)
CHLORIDE SERPL-SCNC: 105 MMOL/L (ref 98–107)
CHLORIDE SERPL-SCNC: 106 MMOL/L (ref 98–107)
CLARITY UR: ABNORMAL
CO2 SERPL-SCNC: 22 MMOL/L (ref 22–29)
CO2 SERPL-SCNC: 24 MMOL/L (ref 22–29)
COHB: 0.2 % (ref 0–1.5)
COHB: 0.3 % (ref 0–1.5)
COHB: 0.3 % (ref 0–1.5)
COHB: 0.6 % (ref 0–1.5)
COLOR UR: YELLOW
CREAT SERPL-MCNC: 2.5 MG/DL (ref 0.7–1.2)
CREAT SERPL-MCNC: 2.8 MG/DL (ref 0.7–1.2)
CREAT UR-MCNC: 116.6 MG/DL (ref 40–278)
CREAT UR-MCNC: 117.2 MG/DL (ref 40–278)
CRITICAL: ABNORMAL
DATE ANALYZED: ABNORMAL
DATE OF COLLECTION: ABNORMAL
EPI CELLS #/AREA URNS HPF: ABNORMAL /HPF
ERYTHROCYTE [DISTWIDTH] IN BLOOD BY AUTOMATED COUNT: 16.1 % (ref 11.5–15)
ERYTHROCYTE [DISTWIDTH] IN BLOOD BY AUTOMATED COUNT: 16.2 % (ref 11.5–15)
FIO2: 40 %
FIO2: 40 %
FLUAV RNA NPH QL NAA+NON-PROBE: NOT DETECTED
FLUBV RNA NPH QL NAA+NON-PROBE: NOT DETECTED
GFR, ESTIMATED: 22 ML/MIN/1.73M2
GFR, ESTIMATED: 26 ML/MIN/1.73M2
GLUCOSE BLD-MCNC: 125 MG/DL (ref 74–99)
GLUCOSE BLD-MCNC: 168 MG/DL (ref 74–99)
GLUCOSE BLD-MCNC: 189 MG/DL (ref 74–99)
GLUCOSE BLD-MCNC: 83 MG/DL (ref 74–99)
GLUCOSE BLD-MCNC: 90 MG/DL (ref 74–99)
GLUCOSE SERPL-MCNC: 124 MG/DL (ref 74–99)
GLUCOSE SERPL-MCNC: 85 MG/DL (ref 74–99)
GLUCOSE UR STRIP-MCNC: 100 MG/DL
HADV DNA NPH QL NAA+NON-PROBE: NOT DETECTED
HBA1C MFR BLD: 6 % (ref 4–5.6)
HCO3: 21.4 MMOL/L (ref 22–26)
HCO3: 22.1 MMOL/L (ref 22–26)
HCO3: 23.7 MMOL/L (ref 22–26)
HCO3: 23.7 MMOL/L (ref 22–26)
HCOV 229E RNA NPH QL NAA+NON-PROBE: NOT DETECTED
HCOV HKU1 RNA NPH QL NAA+NON-PROBE: NOT DETECTED
HCOV NL63 RNA NPH QL NAA+NON-PROBE: NOT DETECTED
HCOV OC43 RNA NPH QL NAA+NON-PROBE: NOT DETECTED
HCT VFR BLD AUTO: 28.6 % (ref 37–54)
HCT VFR BLD AUTO: 29 % (ref 37–54)
HGB BLD-MCNC: 8.7 G/DL (ref 12.5–16.5)
HGB BLD-MCNC: 8.8 G/DL (ref 12.5–16.5)
HGB UR QL STRIP.AUTO: ABNORMAL
HHB: 1.2 % (ref 0–5)
HHB: 1.3 % (ref 0–5)
HHB: 1.6 % (ref 0–5)
HHB: 2.1 % (ref 0–5)
HMPV RNA NPH QL NAA+NON-PROBE: NOT DETECTED
HPIV1 RNA NPH QL NAA+NON-PROBE: NOT DETECTED
HPIV2 RNA NPH QL NAA+NON-PROBE: NOT DETECTED
HPIV3 RNA NPH QL NAA+NON-PROBE: NOT DETECTED
HPIV4 RNA NPH QL NAA+NON-PROBE: NOT DETECTED
KETONES UR STRIP-MCNC: NEGATIVE MG/DL
LAB: ABNORMAL
LEUKOCYTE ESTERASE UR QL STRIP: ABNORMAL
Lab: 1444
Lab: 1845
Lab: 233
Lab: 927
M PNEUMO DNA NPH QL NAA+NON-PROBE: NOT DETECTED
MCH RBC QN AUTO: 29.3 PG (ref 26–35)
MCH RBC QN AUTO: 29.6 PG (ref 26–35)
MCHC RBC AUTO-ENTMCNC: 30.3 G/DL (ref 32–34.5)
MCHC RBC AUTO-ENTMCNC: 30.4 G/DL (ref 32–34.5)
MCV RBC AUTO: 96.7 FL (ref 80–99.9)
MCV RBC AUTO: 97.3 FL (ref 80–99.9)
METHB: 0.5 % (ref 0–1.5)
METHB: 0.5 % (ref 0–1.5)
METHB: 0.6 % (ref 0–1.5)
METHB: 0.6 % (ref 0–1.5)
MODE: ABNORMAL
NITRITE UR QL STRIP: NEGATIVE
O2 SATURATION: 97.9 % (ref 92–98.5)
O2 SATURATION: 98.4 % (ref 92–98.5)
O2 SATURATION: 98.7 % (ref 92–98.5)
O2 SATURATION: 98.8 % (ref 92–98.5)
O2HB: 97 % (ref 94–97)
O2HB: 97.6 % (ref 94–97)
O2HB: 97.6 % (ref 94–97)
O2HB: 98 % (ref 94–97)
OPERATOR ID: 1115
OPERATOR ID: 3214
OPERATOR ID: 8214
OPERATOR ID: 8214
PARTIAL THROMBOPLASTIN TIME: 103.2 SEC (ref 24.5–35.1)
PARTIAL THROMBOPLASTIN TIME: 38.7 SEC (ref 24.5–35.1)
PARTIAL THROMBOPLASTIN TIME: 46 SEC (ref 24.5–35.1)
PARTIAL THROMBOPLASTIN TIME: 75.7 SEC (ref 24.5–35.1)
PATIENT TEMP: 37 C
PCO2: 47 MMHG (ref 35–45)
PCO2: 47.7 MMHG (ref 35–45)
PCO2: 50.5 MMHG (ref 35–45)
PCO2: 51.6 MMHG (ref 35–45)
PEEP/CPAP: 8 CMH2O
PEEP/CPAP: 8 CMH2O
PFO2: 2.76 MMHG/%
PFO2: 3.28 MMHG/%
PH BLOOD GAS: 7.24 (ref 7.35–7.45)
PH BLOOD GAS: 7.28 (ref 7.35–7.45)
PH BLOOD GAS: 7.29 (ref 7.35–7.45)
PH BLOOD GAS: 7.32 (ref 7.35–7.45)
PH UR STRIP: 5.5 [PH] (ref 5–9)
PLATELET # BLD AUTO: 234 K/UL (ref 130–450)
PLATELET # BLD AUTO: 244 K/UL (ref 130–450)
PMV BLD AUTO: 10.2 FL (ref 7–12)
PMV BLD AUTO: 10.5 FL (ref 7–12)
PO2: 110.3 MMHG (ref 75–100)
PO2: 130.6 MMHG (ref 75–100)
PO2: 131.4 MMHG (ref 75–100)
PO2: 143.8 MMHG (ref 75–100)
POTASSIUM SERPL-SCNC: 4.6 MMOL/L (ref 3.5–5)
POTASSIUM SERPL-SCNC: 4.7 MMOL/L (ref 3.5–5)
PROT UR STRIP-MCNC: 30 MG/DL
RBC # BLD AUTO: 2.94 M/UL (ref 3.8–5.8)
RBC # BLD AUTO: 3 M/UL (ref 3.8–5.8)
RBC #/AREA URNS HPF: ABNORMAL /HPF
RI(T): 0.76
RI(T): 1.06
RR MECHANICAL: 16 B/MIN
RR MECHANICAL: 16 B/MIN
RSV RNA NPH QL NAA+NON-PROBE: NOT DETECTED
RV+EV RNA NPH QL NAA+NON-PROBE: NOT DETECTED
SARS-COV-2 RNA NPH QL NAA+NON-PROBE: NOT DETECTED
SODIUM SERPL-SCNC: 136 MMOL/L (ref 132–146)
SODIUM SERPL-SCNC: 138 MMOL/L (ref 132–146)
SODIUM UR-SCNC: 30 MMOL/L
SOURCE, BLOOD GAS: ABNORMAL
SP GR UR STRIP: 1.02 (ref 1–1.03)
SPECIMEN DESCRIPTION: NORMAL
THB: 9.1 G/DL (ref 11.5–16.5)
THB: 9.4 G/DL (ref 11.5–16.5)
THB: 9.5 G/DL (ref 11.5–16.5)
THB: 9.8 G/DL (ref 11.5–16.5)
TIME ANALYZED: 1501
TIME ANALYZED: 1855
TIME ANALYZED: 243
TIME ANALYZED: 942
TOTAL PROTEIN, URINE: 44 MG/DL (ref 0–12)
TROPONIN I SERPL HS-MCNC: 261 NG/L (ref 0–11)
TROPONIN I SERPL HS-MCNC: 267 NG/L (ref 0–11)
TROPONIN I SERPL HS-MCNC: 281 NG/L (ref 0–11)
TSH SERPL DL<=0.05 MIU/L-ACNC: 2.35 UIU/ML (ref 0.27–4.2)
URINE TOTAL PROTEIN CREATININE RATIO: 0.38 (ref 0–0.2)
UROBILINOGEN UR STRIP-ACNC: 0.2 EU/DL (ref 0–1)
VT MECHANICAL: 400 ML
VT MECHANICAL: 450 ML
WBC #/AREA URNS HPF: ABNORMAL /HPF
WBC OTHER # BLD: 8.5 K/UL (ref 4.5–11.5)
WBC OTHER # BLD: 8.6 K/UL (ref 4.5–11.5)

## 2024-10-16 PROCEDURE — 2060000000 HC ICU INTERMEDIATE R&B

## 2024-10-16 PROCEDURE — 71045 X-RAY EXAM CHEST 1 VIEW: CPT

## 2024-10-16 PROCEDURE — 5A09357 ASSISTANCE WITH RESPIRATORY VENTILATION, LESS THAN 24 CONSECUTIVE HOURS, CONTINUOUS POSITIVE AIRWAY PRESSURE: ICD-10-PCS | Performed by: INTERNAL MEDICINE

## 2024-10-16 PROCEDURE — 99223 1ST HOSP IP/OBS HIGH 75: CPT

## 2024-10-16 PROCEDURE — 87040 BLOOD CULTURE FOR BACTERIA: CPT

## 2024-10-16 PROCEDURE — 82805 BLOOD GASES W/O2 SATURATION: CPT

## 2024-10-16 PROCEDURE — 6360000002 HC RX W HCPCS

## 2024-10-16 PROCEDURE — 2580000003 HC RX 258

## 2024-10-16 PROCEDURE — 82140 ASSAY OF AMMONIA: CPT

## 2024-10-16 PROCEDURE — 36415 COLL VENOUS BLD VENIPUNCTURE: CPT

## 2024-10-16 PROCEDURE — 94660 CPAP INITIATION&MGMT: CPT

## 2024-10-16 PROCEDURE — 81001 URINALYSIS AUTO W/SCOPE: CPT

## 2024-10-16 PROCEDURE — 36600 WITHDRAWAL OF ARTERIAL BLOOD: CPT

## 2024-10-16 PROCEDURE — 6360000002 HC RX W HCPCS: Performed by: STUDENT IN AN ORGANIZED HEALTH CARE EDUCATION/TRAINING PROGRAM

## 2024-10-16 PROCEDURE — 2580000003 HC RX 258: Performed by: STUDENT IN AN ORGANIZED HEALTH CARE EDUCATION/TRAINING PROGRAM

## 2024-10-16 PROCEDURE — 84300 ASSAY OF URINE SODIUM: CPT

## 2024-10-16 PROCEDURE — 84156 ASSAY OF PROTEIN URINE: CPT

## 2024-10-16 PROCEDURE — 85027 COMPLETE CBC AUTOMATED: CPT

## 2024-10-16 PROCEDURE — 6370000000 HC RX 637 (ALT 250 FOR IP): Performed by: STUDENT IN AN ORGANIZED HEALTH CARE EDUCATION/TRAINING PROGRAM

## 2024-10-16 PROCEDURE — 84443 ASSAY THYROID STIM HORMONE: CPT

## 2024-10-16 PROCEDURE — 2700000000 HC OXYGEN THERAPY PER DAY

## 2024-10-16 PROCEDURE — 0202U NFCT DS 22 TRGT SARS-COV-2: CPT

## 2024-10-16 PROCEDURE — 80048 BASIC METABOLIC PNL TOTAL CA: CPT

## 2024-10-16 PROCEDURE — 99222 1ST HOSP IP/OBS MODERATE 55: CPT | Performed by: PSYCHIATRY & NEUROLOGY

## 2024-10-16 PROCEDURE — 83036 HEMOGLOBIN GLYCOSYLATED A1C: CPT

## 2024-10-16 PROCEDURE — 82570 ASSAY OF URINE CREATININE: CPT

## 2024-10-16 PROCEDURE — 85730 THROMBOPLASTIN TIME PARTIAL: CPT

## 2024-10-16 PROCEDURE — 6370000000 HC RX 637 (ALT 250 FOR IP)

## 2024-10-16 PROCEDURE — 82962 GLUCOSE BLOOD TEST: CPT

## 2024-10-16 PROCEDURE — 82043 UR ALBUMIN QUANTITATIVE: CPT

## 2024-10-16 PROCEDURE — 84484 ASSAY OF TROPONIN QUANT: CPT

## 2024-10-16 RX ORDER — ACETAMINOPHEN 650 MG/1
650 SUPPOSITORY RECTAL EVERY 6 HOURS PRN
Status: DISCONTINUED | OUTPATIENT
Start: 2024-10-16 | End: 2024-10-23 | Stop reason: HOSPADM

## 2024-10-16 RX ORDER — GLUCAGON 1 MG/ML
1 KIT INJECTION PRN
Status: DISCONTINUED | OUTPATIENT
Start: 2024-10-16 | End: 2024-10-23 | Stop reason: HOSPADM

## 2024-10-16 RX ORDER — INSULIN GLARGINE 100 [IU]/ML
14 INJECTION, SOLUTION SUBCUTANEOUS NIGHTLY
Status: DISCONTINUED | OUTPATIENT
Start: 2024-10-16 | End: 2024-10-23 | Stop reason: HOSPADM

## 2024-10-16 RX ORDER — INSULIN LISPRO 100 [IU]/ML
0-8 INJECTION, SOLUTION INTRAVENOUS; SUBCUTANEOUS
Status: DISCONTINUED | OUTPATIENT
Start: 2024-10-16 | End: 2024-10-23 | Stop reason: HOSPADM

## 2024-10-16 RX ORDER — ONDANSETRON 4 MG/1
4 TABLET, ORALLY DISINTEGRATING ORAL EVERY 8 HOURS PRN
Status: DISCONTINUED | OUTPATIENT
Start: 2024-10-16 | End: 2024-10-23 | Stop reason: HOSPADM

## 2024-10-16 RX ORDER — SODIUM CHLORIDE 9 MG/ML
INJECTION, SOLUTION INTRAVENOUS CONTINUOUS
Status: DISCONTINUED | OUTPATIENT
Start: 2024-10-16 | End: 2024-10-18

## 2024-10-16 RX ORDER — ACETAMINOPHEN 325 MG/1
650 TABLET ORAL EVERY 6 HOURS PRN
Status: DISCONTINUED | OUTPATIENT
Start: 2024-10-16 | End: 2024-10-23 | Stop reason: HOSPADM

## 2024-10-16 RX ORDER — INSULIN GLARGINE 100 [IU]/ML
25 INJECTION, SOLUTION SUBCUTANEOUS NIGHTLY
Status: DISCONTINUED | OUTPATIENT
Start: 2024-10-16 | End: 2024-10-16

## 2024-10-16 RX ORDER — 0.9 % SODIUM CHLORIDE 0.9 %
1000 INTRAVENOUS SOLUTION INTRAVENOUS ONCE
Status: DISCONTINUED | OUTPATIENT
Start: 2024-10-16 | End: 2024-10-23 | Stop reason: HOSPADM

## 2024-10-16 RX ORDER — SODIUM CHLORIDE 9 MG/ML
INJECTION, SOLUTION INTRAVENOUS PRN
Status: DISCONTINUED | OUTPATIENT
Start: 2024-10-16 | End: 2024-10-23 | Stop reason: HOSPADM

## 2024-10-16 RX ORDER — BRIMONIDINE TARTRATE 2 MG/ML
1 SOLUTION/ DROPS OPHTHALMIC 2 TIMES DAILY
Status: DISCONTINUED | OUTPATIENT
Start: 2024-10-16 | End: 2024-10-23 | Stop reason: HOSPADM

## 2024-10-16 RX ORDER — CLOPIDOGREL BISULFATE 75 MG/1
75 TABLET ORAL DAILY
Status: DISCONTINUED | OUTPATIENT
Start: 2024-10-16 | End: 2024-10-23 | Stop reason: HOSPADM

## 2024-10-16 RX ORDER — DEXTROSE MONOHYDRATE 100 MG/ML
INJECTION, SOLUTION INTRAVENOUS CONTINUOUS PRN
Status: DISCONTINUED | OUTPATIENT
Start: 2024-10-16 | End: 2024-10-23 | Stop reason: HOSPADM

## 2024-10-16 RX ORDER — ONDANSETRON 2 MG/ML
4 INJECTION INTRAMUSCULAR; INTRAVENOUS EVERY 6 HOURS PRN
Status: DISCONTINUED | OUTPATIENT
Start: 2024-10-16 | End: 2024-10-23 | Stop reason: HOSPADM

## 2024-10-16 RX ORDER — SODIUM CHLORIDE 0.9 % (FLUSH) 0.9 %
5-40 SYRINGE (ML) INJECTION EVERY 12 HOURS SCHEDULED
Status: DISCONTINUED | OUTPATIENT
Start: 2024-10-16 | End: 2024-10-23 | Stop reason: HOSPADM

## 2024-10-16 RX ORDER — POTASSIUM CHLORIDE 1500 MG/1
40 TABLET, EXTENDED RELEASE ORAL PRN
Status: DISCONTINUED | OUTPATIENT
Start: 2024-10-16 | End: 2024-10-23 | Stop reason: HOSPADM

## 2024-10-16 RX ORDER — HEPARIN SODIUM 1000 [USP'U]/ML
4000 INJECTION, SOLUTION INTRAVENOUS; SUBCUTANEOUS PRN
Status: DISCONTINUED | OUTPATIENT
Start: 2024-10-16 | End: 2024-10-16

## 2024-10-16 RX ORDER — SODIUM CHLORIDE 0.9 % (FLUSH) 0.9 %
5-40 SYRINGE (ML) INJECTION PRN
Status: DISCONTINUED | OUTPATIENT
Start: 2024-10-16 | End: 2024-10-23 | Stop reason: HOSPADM

## 2024-10-16 RX ORDER — IPRATROPIUM BROMIDE AND ALBUTEROL SULFATE 2.5; .5 MG/3ML; MG/3ML
1 SOLUTION RESPIRATORY (INHALATION) EVERY 4 HOURS PRN
Status: DISCONTINUED | OUTPATIENT
Start: 2024-10-16 | End: 2024-10-23 | Stop reason: HOSPADM

## 2024-10-16 RX ORDER — NAPROXEN SODIUM 220 MG/1
220 TABLET, FILM COATED ORAL EVERY 6 HOURS PRN
Status: ON HOLD | COMMUNITY
End: 2024-10-22 | Stop reason: HOSPADM

## 2024-10-16 RX ORDER — POLYETHYLENE GLYCOL 3350 17 G/17G
17 POWDER, FOR SOLUTION ORAL DAILY PRN
Status: DISCONTINUED | OUTPATIENT
Start: 2024-10-16 | End: 2024-10-23 | Stop reason: HOSPADM

## 2024-10-16 RX ORDER — CARVEDILOL 6.25 MG/1
12.5 TABLET ORAL 2 TIMES DAILY WITH MEALS
Status: DISCONTINUED | OUTPATIENT
Start: 2024-10-16 | End: 2024-10-23 | Stop reason: HOSPADM

## 2024-10-16 RX ORDER — ATORVASTATIN CALCIUM 20 MG/1
20 TABLET, FILM COATED ORAL DAILY
Status: DISCONTINUED | OUTPATIENT
Start: 2024-10-16 | End: 2024-10-18

## 2024-10-16 RX ORDER — PANTOPRAZOLE SODIUM 40 MG/1
40 TABLET, DELAYED RELEASE ORAL DAILY
Status: DISCONTINUED | OUTPATIENT
Start: 2024-10-16 | End: 2024-10-23 | Stop reason: HOSPADM

## 2024-10-16 RX ORDER — HEPARIN SODIUM 10000 [USP'U]/100ML
5-30 INJECTION, SOLUTION INTRAVENOUS CONTINUOUS
Status: DISCONTINUED | OUTPATIENT
Start: 2024-10-16 | End: 2024-10-16

## 2024-10-16 RX ORDER — POTASSIUM CHLORIDE 1.5 G/1.58G
20 POWDER, FOR SOLUTION ORAL DAILY
COMMUNITY

## 2024-10-16 RX ORDER — HEPARIN SODIUM 1000 [USP'U]/ML
4000 INJECTION, SOLUTION INTRAVENOUS; SUBCUTANEOUS ONCE
Status: COMPLETED | OUTPATIENT
Start: 2024-10-16 | End: 2024-10-16

## 2024-10-16 RX ORDER — POTASSIUM CHLORIDE 7.45 MG/ML
10 INJECTION INTRAVENOUS PRN
Status: DISCONTINUED | OUTPATIENT
Start: 2024-10-16 | End: 2024-10-23 | Stop reason: HOSPADM

## 2024-10-16 RX ORDER — MAGNESIUM SULFATE IN WATER 40 MG/ML
2000 INJECTION, SOLUTION INTRAVENOUS PRN
Status: DISCONTINUED | OUTPATIENT
Start: 2024-10-16 | End: 2024-10-23 | Stop reason: HOSPADM

## 2024-10-16 RX ORDER — ASPIRIN 81 MG/1
81 TABLET ORAL 2 TIMES DAILY
Status: DISCONTINUED | OUTPATIENT
Start: 2024-10-16 | End: 2024-10-23 | Stop reason: HOSPADM

## 2024-10-16 RX ORDER — HEPARIN SODIUM 1000 [USP'U]/ML
2000 INJECTION, SOLUTION INTRAVENOUS; SUBCUTANEOUS PRN
Status: DISCONTINUED | OUTPATIENT
Start: 2024-10-16 | End: 2024-10-16

## 2024-10-16 RX ORDER — MAGNESIUM HYDROXIDE/ALUMINUM HYDROXICE/SIMETHICONE 120; 1200; 1200 MG/30ML; MG/30ML; MG/30ML
30 SUSPENSION ORAL EVERY 6 HOURS PRN
Status: DISCONTINUED | OUTPATIENT
Start: 2024-10-16 | End: 2024-10-23 | Stop reason: HOSPADM

## 2024-10-16 RX ADMIN — BRIMONIDINE TARTRATE 1 DROP: 2 SOLUTION/ DROPS OPHTHALMIC at 21:40

## 2024-10-16 RX ADMIN — BRIMONIDINE TARTRATE 1 DROP: 2 SOLUTION/ DROPS OPHTHALMIC at 09:02

## 2024-10-16 RX ADMIN — PANTOPRAZOLE SODIUM 40 MG: 40 TABLET, DELAYED RELEASE ORAL at 09:02

## 2024-10-16 RX ADMIN — SODIUM CHLORIDE: 9 INJECTION, SOLUTION INTRAVENOUS at 12:46

## 2024-10-16 RX ADMIN — ASPIRIN 81 MG: 81 TABLET, COATED ORAL at 21:39

## 2024-10-16 RX ADMIN — CARVEDILOL 12.5 MG: 6.25 TABLET, FILM COATED ORAL at 09:02

## 2024-10-16 RX ADMIN — ATORVASTATIN CALCIUM 20 MG: 20 TABLET, FILM COATED ORAL at 09:02

## 2024-10-16 RX ADMIN — HEPARIN SODIUM 4000 UNITS: 1000 INJECTION INTRAVENOUS; SUBCUTANEOUS at 01:59

## 2024-10-16 RX ADMIN — HEPARIN SODIUM 12 UNITS/KG/HR: 10000 INJECTION, SOLUTION INTRAVENOUS at 01:58

## 2024-10-16 RX ADMIN — INSULIN GLARGINE 14 UNITS: 100 INJECTION, SOLUTION SUBCUTANEOUS at 02:04

## 2024-10-16 RX ADMIN — CEFTRIAXONE SODIUM 1000 MG: 1 INJECTION, POWDER, FOR SOLUTION INTRAMUSCULAR; INTRAVENOUS at 14:30

## 2024-10-16 RX ADMIN — SODIUM CHLORIDE, PRESERVATIVE FREE 10 ML: 5 INJECTION INTRAVENOUS at 09:02

## 2024-10-16 RX ADMIN — ASPIRIN 81 MG: 81 TABLET, COATED ORAL at 02:00

## 2024-10-16 RX ADMIN — CLOPIDOGREL BISULFATE 75 MG: 75 TABLET ORAL at 09:02

## 2024-10-16 RX ADMIN — ASPIRIN 81 MG: 81 TABLET, COATED ORAL at 09:02

## 2024-10-16 RX ADMIN — CARVEDILOL 12.5 MG: 6.25 TABLET, FILM COATED ORAL at 18:41

## 2024-10-16 RX ADMIN — SODIUM CHLORIDE: 9 INJECTION, SOLUTION INTRAVENOUS at 23:15

## 2024-10-16 ASSESSMENT — PAIN SCALES - GENERAL
PAINLEVEL_OUTOF10: 0

## 2024-10-16 NOTE — PLAN OF CARE
Patient seen and examined at bedside.  Patient appears lethargic but arousable, following commands.    Reviewed ABGs, respiratory acidosis, hypohypercapnia noted, started on NIV, pulmonology consulted, viral panel negative, chest x-ray ordered    Cardiology consulted due to NSTEMI, remains on heparin drip, Await Echo.    Neurology consulted for evaluation of altered mental status, MRI brain pending, has history of TIA    Elevated BUN and creatinine, baseline seems to be 2.2, creatinine was 2.8 on admission, nephrology consulted    IV fluids initiated, patient was hypotensive 1 L NS bolus was given followed by maintenance.    Urine analysis suggestive of UTI, will start ceftriaxone, await urine cultures and sensitivity, blood cultures ordered.    Please refer to my colleagues H&P from this morning for detailed information.

## 2024-10-16 NOTE — CONSULTS
Department of Internal Medicine  Nephrology Consult Note      Reason for Consult:  MAMI on CKD  Requesting Physician:  Tamiko Michel MD     CHIEF COMPLAINT:  AMS    History Obtained From:  patient, electronic medical record    HISTORY OF PRESENT ILLNESS:  Briefly Mr. Aubrey Up is an 83-year-old male with a PMH of CKD stage IV, due to ischemic ATN with previous need of renal replacement therapy (CRRT and transition to hemodialysis) with recovery of renal function enough to discontinue dialysis, dialysis membrane allergy, HTN, type II DM, paroxysmal SVT, ANIBAL on CPAP, asbestosis, prostate cancer, CVA, who was admitted on 10/16/2024 after he was transferred to [facility] from [facility] where he initially presented with altered mental status, CT scan of the brain showed only age related changes, his EKG showed ST elevation with elevated troponin level at 322, repeat value was >500 consistent with NSTEMI, reason for his transferring to this center for further evaluation.    Past Medical History:        Diagnosis Date    Acute respiratory failure (HCC)     CVA (cerebral vascular accident) (HCC)     Diabetes mellitus (HCC)     Type 2    Dysphagia     Gastroparesis     H/O asbestosis     History of paroxysmal supraventricular tachycardia 06/11/2019    Neuropathy     Prostate CA (HCC)     Sleep apnea with use of continuous positive airway pressure (CPAP)     TIA (transient ischemic attack)      Past Surgical History:        Procedure Laterality Date    CERVICAL DISC SURGERY      CHOLECYSTECTOMY      KNEE ARTHROPLASTY      PROSTATE BIOPSY      UPPER GASTROINTESTINAL ENDOSCOPY N/A 8/6/2024    ESOPHAGOGASTRODUODENOSCOPY PERCUTANEOUS ENDOSCOPIC GASTROSTOMY TUBE PLACEMENT performed by Daryl Stokes MD at [facility] ENDOSCOPY     Current Medications:    Current Facility-Administered Medications: acetaminophen (TYLENOL) tablet 650 mg, 650 mg, Per NG tube, Q6H PRN  aspirin EC tablet 81 mg, 81 mg, Oral, BID  atorvastatin (LIPITOR) tablet 20  obtain iron studies, B12 and folate  ------------------------------------------------  CAD, NSTEMI, heparin gtt, clopidogrel, carvedilol, ASA  Acute hypoxic and hypercapnic respiratory failure  Type II DM, SSI  BPH, on tamsulosin  HLD, on atorvastatin   ANIBAL on BiPAP  History of asbestosis   History of pulmonary nodules   History of prostate cancer   Nutrition, S/P PEG tube, NPO      Plan:    Continue NS at 100 ml/hr  Hold lisinopril   Continue carvedilol 12.5 mg BID  Monitor renal function   Monitor blood pressure   Obtain iron studies, B12 and folate    Jaspal Franklin MD      Thank very much Dr.Khan[facility], for allowing us to participate in the care of Mr. Up

## 2024-10-16 NOTE — H&P
Hospitalist History & Physical      PCP: Dionne Singh DO    Date of Service: Pt seen/examined on 10/16/2024     admitted to Inpatient with expected LOS greater than two midnights due to medical therapy.      Chief Complaint:  had no chief complaint listed for this encounter.    History of Present Illness:    Mr. Aubrey Up, a 83 y.o. year old male  who  has a past medical history of Acute respiratory failure (HCC), CVA (cerebral vascular accident) (HCC), Diabetes mellitus (HCC), Dysphagia, Gastroparesis, H/O asbestosis, History of paroxysmal supraventricular tachycardia, Neuropathy, Prostate CA (HCC), Sleep apnea with use of continuous positive airway pressure (CPAP), and TIA (transient ischemic attack).  Patient initially presented to [facility] due to increased confusion as well as auditory and visual hallucinations.  Per patient's family patient has had increased confusion since Saturday after a fall.  Patient's son and wife both stating that he was asking where his dog is.  They confirmed he does not have a dog also thought police officers were in his home.  Of note, patient had recent prolonged hospitalization followed by rehab stay that included intubation for pneumonia as well as CVVHD and hemodialysis, he was discharged to home early in October.  Hospital course at [place] significant for normal ammonia level, CT scan showed only age-related changes, EKG showed no acute ST elevation but troponin was noted to be elevated at 322 with repeat greater than 500.  Decision was made to transfer patient to [facility] main campus for NSTEMI with consult placed for cardiology.     Past Medical History:        Diagnosis Date    Acute respiratory failure (HCC)     CVA (cerebral vascular accident) (HCC)     Diabetes mellitus (HCC)     Type 2    Dysphagia     Gastroparesis     H/O asbestosis     History of paroxysmal supraventricular tachycardia 06/11/2019    Neuropathy     Prostate  Selene Quinteros MD   Rosuvastatin Calcium 20 MG CPSP Take by mouth daily  Patient not taking: Reported on 8/5/2024    Selene Quinteros MD   Misc. Devices (ALL-BODY MASSAGE) MISC Massage as needed for generalized body pain 5/6/20   Dionne Singh DO   timolol (TIMOPTIC) 0.5 % ophthalmic solution  5/13/19   Selene Quinteros MD   HM OMEGA-3-6-9 FATTY ACIDS CAPS Take 1,200 mg by mouth daily  Patient not taking: Reported on 8/5/2024    Selene Quinteros MD   lisinopril (PRINIVIL;ZESTRIL) 10 MG tablet Take 1 tablet by mouth daily  Patient not taking: Reported on 8/5/2024    Selene Quinteros MD   clopidogrel (PLAVIX) 75 MG tablet Take 1 tablet by mouth daily    Selene Quinteros MD   vitamin D (CHOLECALCIFEROL) 1000 UNIT TABS tablet Take 1 tablet by mouth daily  Patient not taking: Reported on 8/5/2024    Selene Quinteros MD   Multiple Vitamins-Minerals (MULTI FOR HIM PO) Take by mouth daily  Patient not taking: Reported on 8/5/2024    Selene Quinteros MD   aspirin 81 MG tablet Take 1 tablet by mouth 2 times daily    Selene Quinteros MD       Allergies:  Patient has no known allergies.    Social History:    RESIDENCE: Home  TOBACCO:   reports that he has never smoked. He has never used smokeless tobacco.  ETOH:   reports that he does not currently use alcohol.    Family History:    Reviewed in detail and negative for DM, CAD, Cancer, CVA. Positive as follows:      Problem Relation Age of Onset    Dementia Mother        Review of Systems:  All bolded are positive; please see HPI  General:  Fever, chills, diaphoresis, fatigue, malaise, night sweats, weight loss  Psychological:  Anxiety, disorientation, hallucinations.  ENT:  Epistaxis, headaches, vertigo, visual changes.  Cardiovascular:  Chest pain, irregular heartbeats, palpitations, paroxysmal nocturnal dyspnea.  Respiratory:  Shortness of breath, coughing, sputum production, hemoptysis, wheezing, orthopnea.  Gastrointestinal:

## 2024-10-16 NOTE — CONSULTS
[facility] where he was weaned down to room air.  During his stay at [facility], he had an aspiration event on tube feeding requiring AVAPS for recovery with settings of 400, 40%, +8.  He was eventually weaned down on his daytime oxygen and was just requiring AVAPS overnight.  He was discharged home once leaving [facility].    The family reports that the patient has a history of obstructive sleep apnea and did have a CPAP at 1 point however it had broken a long time ago and he has not had one since.  When he discharged home from [facility] specialty in [place] he did not have any kind of NIV device.  He is a lifelong non-smoker.  He has worked as a , ,  and has been diagnosed with asbestosis.    Aubrey was seen today lying in bed on the NIV.  Wife and son are present at the bedside.  Bedside nurses also present preparing an IV fluid bolus for low blood pressure of 79/37.  The patient responds to voice but is quite lethargic.  Lungs are diminished on exam.      Past Medical History:   Diagnosis Date    Acute respiratory failure (HCC)     CVA (cerebral vascular accident) (HCC)     Diabetes mellitus (HCC)     Type 2    Dysphagia     Gastroparesis     H/O asbestosis     History of paroxysmal supraventricular tachycardia 06/11/2019    Neuropathy     Prostate CA (HCC)     Sleep apnea with use of continuous positive airway pressure (CPAP)     TIA (transient ischemic attack)        Past Surgical History:   Procedure Laterality Date    CERVICAL DISC SURGERY      CHOLECYSTECTOMY      KNEE ARTHROPLASTY      PROSTATE BIOPSY      UPPER GASTROINTESTINAL ENDOSCOPY N/A 8/6/2024    ESOPHAGOGASTRODUODENOSCOPY PERCUTANEOUS ENDOSCOPIC GASTROSTOMY TUBE PLACEMENT performed by Daryl Stokes MD at [facility] ENDOSCOPY       Family History   Problem Relation Age of Onset    Dementia Mother        Social History:   Social History     Socioeconomic History    Marital status:      Spouse name: Not on file    Number of  Not on file     [facility] Domestic Abuse - Time Frame: Not on file     [facility] Domestic Abuse - Signs and Symptoms: Not on file     Verbal Abuse: Denies     Possible abuse reported to:: Other (Comment)   Housing Stability: Patient Unable To Answer (10/16/2024)    Housing Stability Vital Sign     Unable to Pay for Housing in the Last Year: Patient unable to answer     Number of Times Moved in the Last Year: 0     Homeless in the Last Year: Patient unable to answer     Smoking history: The patient is a lifelong non-smoker    ETOH:   reports that he does not currently use alcohol.    Exposures: The patient has an extensive work history as a ,  and .    Vaccines:    Influenza:  Indicated for current flu vaccination season Oct. to Feb.  Pneumococcal Polysaccharide: Recommended if not current  Immunization History   Administered Date(s) Administered    COVID-19, PFIZER Bivalent, DO NOT Dilute, (age 12y+), IM, 30 mcg/0.3 mL 09/15/2022    COVID-19, PFIZER PURPLE top, DILUTE for use, (age 12 y+), 30mcg/0.3mL 01/25/2021, 02/12/2021, 12/11/2021    DTaP, DAPTACEL, (age 6w-6y), IM, 0.5mL 09/13/2021    Influenza Vaccine, unspecified formulation 12/05/2017    Influenza Virus Vaccine 10/14/2008, 09/24/2009, 10/01/2010, 10/01/2011, 09/01/2013, 09/20/2015, 10/01/2016, 11/01/2018, 04/09/2019    Influenza, FLUAD, (age 65 y+), IM, Quadv, 0.5mL 10/18/2021, 09/29/2022    Influenza, FLUAD, (age 65 y+), IM, Trivalent PF, 0.5mL 09/12/2019    Influenza, FLUARIX, FLULAVAL, FLUZONE (age 6 mo+) and AFLURIA, (age 3 y+), Quadv PF, 0.5mL 04/09/2019    Influenza, FLUBLOK, (age 18 y+), IM, Trivalent PF, 0.5mL 10/29/2020, 10/18/2021    Influenza, FLUZONE High Dose (age 65 y+), IM, Quadv, 0.7mL 10/29/2020, 10/06/2023    Influenza, FLUZONE High Dose, (age 65 y+), IM, Trivalent PF, 0.5mL 10/23/2018, 10/29/2020, 10/18/2021    Pneumococcal, PCV-13, PREVNAR 13, (age 6w+), IM, 0.5mL 05/13/2013    Pneumococcal, PPSV23, PNEUMOVAX 23, (age 2y+),  4L   (L): Data is abnormally low  (H): Data is abnormally high    Assessment:  Acute hypoxic and hypercapnic respiratory failure  ANIBAL  NSTEMI  History of asbestosis  History of pulmonary nodules  History of prostate cancer    Plan:  Wean FiO2 as tolerated maintain SpO2 greater than 92%  NIV in the form of AVAPS- 400, 16, +8 repeat ABGs in 2 hours  Heparin drip infusing for NSTEMI.  Cardiology consult pending  Check CXR  Patient will likely need to discharge home with an NIV device      Thank you for allowing me to participate in the care of Aubrey Up.   Please feel free to call with questions.     This plan of care was reviewed in collaboration with Dr. Snowden    Electronically signed by NAZARIO Pineda CNP on 10/16/2024 at 12:23 PM      Note: This report was completed utilizing computer voice recognition software. Every effort has been made to ensure accuracy, however; inadvertent computerized transcription errors may be present      I personally saw, examined, and cared for the patient. I performed the substantive portion of the visit. Labs, medications, radiographs reviewed. I agree with history exam and plans detailed in NP note.    Patient with previous prolonged hospitalization at [place] and [facility].  Comes in with altered mental status and elevated troponins. Acute hypercapnic resp failure.   Continue NIV, settings adjusted. Repeat ABG.  Check CXR    Hx of extensive asbestos calcification.    Family updated    Alex Snowden DO

## 2024-10-16 NOTE — CONSULTS
Patient known to Dr. Townsend and [facility].  [facility] is covering for [facility].  Pulmonary consultation to be changed.    Gregory Bingham MD  10/16/2024

## 2024-10-16 NOTE — PROGRESS NOTES
4 Eyes Skin Assessment     NAME:  Aubrey Up  YOB: 1940  MEDICAL RECORD NUMBER:  [id number]    The patient is being assessed for  Admission    I agree that at least one RN has performed a thorough Head to Toe Skin Assessment on the patient. ALL assessment sites listed below have been assessed.      Areas assessed by both nurses:    Head, Face, Ears, Shoulders, Back, Chest, Arms, Elbows, Hands, Sacrum. Buttock, Coccyx, Ischium, and Legs. Feet and Heels        Does the Patient have a Wound? No noted wound(s)       Alexander Prevention initiated by RN: Yes  Wound Care Orders initiated by RN: No    Pressure Injury (Stage 3,4, Unstageable, DTI, NWPT, and Complex wounds) if present, place Wound referral order by RN under : No    New Ostomies, if present place, Ostomy referral order under : No     Nurse 1 eSignature: Electronically signed by Alli Rogers RN on 10/16/24 at 6:28 AM EDT    **SHARE this note so that the co-signing nurse can place an eSignature**    Nurse 2 eSignature: Electronically signed by Enrique Leo RN on 10/16/24 at 7:00 AM EDT

## 2024-10-16 NOTE — CONSULTS
[facility]  Neurology Consult    Date:  10/16/2024  Patient Name:  Aubrey Up  YOB: 1940  MRN: [id number]     PCP:  Dionne Singh DO   Referring:  Loren Sena MD      Chief Complaint: Confusion    History obtained from: Patient, wife, son    Assessment  Aubrey Up is a 83 y.o. male who presents with delirium likely due to metabolic derangements      Plan  MRI brain without contrast        History of Present Illness:  Aubrey Up is a 83 y.o. right handed male presenting for evaluation of confusion. Initially presented to [facility] for confusion, auditory and visual hallucinations. Confused since Saturday after a fall when he hit his head but did not lose consciousness; confusion has been waxing and waning. At [place] CT scan showed no acute intracranial abnormality, EKG showed no ST changes but troponin was noted to be elevated (322>> 500) he was transferred to [facility] for cardiology evaluation and treatment. Recent prolonged hospitalization followed by rehab during which he was intubated for pneumonia and was undergoing dialysis. In ED, vitals were stable. Labs: BUN 43 (2.8), ammonia within normal limits. UA was positive for WBC, RBC, bacteria 3+. He is in no acute hypercapnic respiratory failure. On heparin infusion for NSTEMI.    Medical History:   Past Medical History:   Diagnosis Date    Acute respiratory failure (HCC)     CVA (cerebral vascular accident) (HCC)     Diabetes mellitus (HCC)     Type 2    Dysphagia     Gastroparesis     H/O asbestosis     History of paroxysmal supraventricular tachycardia 06/11/2019    Neuropathy     Prostate CA (HCC)     Sleep apnea with use of continuous positive airway pressure (CPAP)     TIA (transient ischemic attack)         Surgical History:   Past Surgical History:   Procedure Laterality Date    CERVICAL DISC SURGERY      CHOLECYSTECTOMY      KNEE ARTHROPLASTY      PROSTATE BIOPSY      UPPER  sodium chloride flush 0.9 % injection 5-40 mL  5-40 mL IntraVENous 2 times per day Jag Dodd APRN - CNP   10 mL at 10/16/24 0902    sodium chloride flush 0.9 % injection 5-40 mL  5-40 mL IntraVENous PRN Jag Dodd APRN - CNP        0.9 % sodium chloride infusion   IntraVENous PRN Jag Dodd APRN - CNP        potassium chloride (KLOR-CON M) extended release tablet 40 mEq  40 mEq Oral PRN Jag Dodd APRN - CNP        Or    potassium bicarb-citric acid (EFFER-K) effervescent tablet 40 mEq  40 mEq Oral PRN Jag Dodd APRN - CNP        Or    potassium chloride 10 mEq/100 mL IVPB (Peripheral Line)  10 mEq IntraVENous PRN Jag Dodd APRN - CNP        magnesium sulfate 2000 mg in 50 mL IVPB premix  2,000 mg IntraVENous PRN Jag Dodd APRN - CNP        ondansetron (ZOFRAN-ODT) disintegrating tablet 4 mg  4 mg Oral Q8H PRN Jag Dodd APRN - CNP        Or    ondansetron (ZOFRAN) injection 4 mg  4 mg IntraVENous Q6H PRN Jag Dodd APRN - CNP        acetaminophen (TYLENOL) tablet 650 mg  650 mg Oral Q6H PRN Jag Dodd APRN - CNP        Or    acetaminophen (TYLENOL) suppository 650 mg  650 mg Rectal Q6H PRN Jag Dodd APRN - CNP        polyethylene glycol (GLYCOLAX) packet 17 g  17 g Oral Daily PRN Jag Dodd APRN - CNP        aluminum & magnesium hydroxide-simethicone (MAALOX) 200-200-20 MG/5ML suspension 30 mL  30 mL Oral Q6H PRN Jag Dodd APRN - CNP        heparin (porcine) injection 4,000 Units  4,000 Units IntraVENous PRN Jag Dodd APRN - CNP        heparin (porcine) injection 2,000 Units  2,000 Units IntraVENous PRN Jag Dodd APRN - CNP        heparin 25,000 units in dextrose 5% 250 mL (premix) infusion  5-30 Units/kg/hr IntraVENous Continuous Jag Dodd APRN - CNP 8.3 mL/hr at 10/16/24 0927 10 Units/kg/hr at 10/16/24 0927    insulin lispro (HUMALOG,ADMELOG) injection vial 0-8 Units  0-8 Units

## 2024-10-17 ENCOUNTER — APPOINTMENT (OUTPATIENT)
Dept: MRI IMAGING | Age: 84
DRG: 064 | End: 2024-10-17
Attending: INTERNAL MEDICINE
Payer: MEDICARE

## 2024-10-17 PROBLEM — F05 ACUTE CONFUSIONAL STATE: Status: ACTIVE | Noted: 2024-10-17

## 2024-10-17 LAB
AADO2: 117.7 MMHG
ANION GAP SERPL CALCULATED.3IONS-SCNC: 8 MMOL/L (ref 7–16)
B.E.: -5.1 MMOL/L (ref -3–3)
BUN SERPL-MCNC: 36 MG/DL (ref 6–23)
CALCIUM SERPL-MCNC: 8.7 MG/DL (ref 8.6–10.2)
CHLORIDE SERPL-SCNC: 108 MMOL/L (ref 98–107)
CHOLEST SERPL-MCNC: 94 MG/DL
CK SERPL-CCNC: 157 U/L (ref 20–200)
CO2 SERPL-SCNC: 24 MMOL/L (ref 22–29)
COHB: 0.1 % (ref 0–1.5)
CREAT SERPL-MCNC: 2.3 MG/DL (ref 0.7–1.2)
CREAT UR-MCNC: 117.3 MG/DL (ref 40–278)
CRITICAL: ABNORMAL
DATE ANALYZED: ABNORMAL
DATE OF COLLECTION: ABNORMAL
ERYTHROCYTE [DISTWIDTH] IN BLOOD BY AUTOMATED COUNT: 16 % (ref 11.5–15)
FERRITIN SERPL-MCNC: 103 NG/ML
FIO2: 40 %
FOLATE SERPL-MCNC: 10.4 NG/ML (ref 4.8–24.2)
GFR, ESTIMATED: 27 ML/MIN/1.73M2
GLUCOSE BLD-MCNC: 116 MG/DL (ref 74–99)
GLUCOSE BLD-MCNC: 133 MG/DL (ref 74–99)
GLUCOSE BLD-MCNC: 165 MG/DL (ref 74–99)
GLUCOSE BLD-MCNC: 178 MG/DL (ref 74–99)
GLUCOSE SERPL-MCNC: 94 MG/DL (ref 74–99)
HCO3: 19.6 MMOL/L (ref 22–26)
HCT VFR BLD AUTO: 25.5 % (ref 37–54)
HDLC SERPL-MCNC: 28 MG/DL
HGB BLD-MCNC: 7.7 G/DL (ref 12.5–16.5)
HHB: 1.4 % (ref 0–5)
IRON SATN MFR SERPL: 8 % (ref 20–55)
IRON SERPL-MCNC: 15 UG/DL (ref 59–158)
LAB: ABNORMAL
LDLC SERPL CALC-MCNC: 39 MG/DL
Lab: 735
MCH RBC QN AUTO: 29.2 PG (ref 26–35)
MCHC RBC AUTO-ENTMCNC: 30.2 G/DL (ref 32–34.5)
MCV RBC AUTO: 96.6 FL (ref 80–99.9)
METHB: 0.6 % (ref 0–1.5)
MICROALBUMIN UR-MCNC: 73 MG/L (ref 0–19)
MICROALBUMIN/CREAT UR-RTO: 62 MCG/MG CREAT (ref 0–30)
MODE: ABNORMAL
O2 SATURATION: 98.6 % (ref 92–98.5)
O2HB: 97.9 % (ref 94–97)
OPERATOR ID: 7490
PATIENT TEMP: 37 C
PCO2: 34.8 MMHG (ref 35–45)
PEEP/CPAP: 8 CMH2O
PFO2: 3.19 MMHG/%
PH BLOOD GAS: 7.37 (ref 7.35–7.45)
PLATELET # BLD AUTO: 198 K/UL (ref 130–450)
PMV BLD AUTO: 10.6 FL (ref 7–12)
PO2: 127.5 MMHG (ref 75–100)
POTASSIUM SERPL-SCNC: 4.31 MMOL/L (ref 3.5–5)
POTASSIUM SERPL-SCNC: 4.6 MMOL/L (ref 3.5–5)
RBC # BLD AUTO: 2.64 M/UL (ref 3.8–5.8)
RI(T): 0.92
RR MECHANICAL: 16 B/MIN
SODIUM SERPL-SCNC: 140 MMOL/L (ref 132–146)
SOURCE, BLOOD GAS: ABNORMAL
THB: 9.1 G/DL (ref 11.5–16.5)
TIBC SERPL-MCNC: 195 UG/DL (ref 250–450)
TIME ANALYZED: 745
TRIGL SERPL-MCNC: 134 MG/DL
VIT B12 SERPL-MCNC: 775 PG/ML (ref 211–946)
VLDLC SERPL CALC-MCNC: 27 MG/DL
VT MECHANICAL: 450 ML
WBC OTHER # BLD: 4.5 K/UL (ref 4.5–11.5)

## 2024-10-17 PROCEDURE — 51798 US URINE CAPACITY MEASURE: CPT

## 2024-10-17 PROCEDURE — 2700000000 HC OXYGEN THERAPY PER DAY

## 2024-10-17 PROCEDURE — 70551 MRI BRAIN STEM W/O DYE: CPT

## 2024-10-17 PROCEDURE — 99232 SBSQ HOSP IP/OBS MODERATE 35: CPT | Performed by: NURSE PRACTITIONER

## 2024-10-17 PROCEDURE — 82805 BLOOD GASES W/O2 SATURATION: CPT

## 2024-10-17 PROCEDURE — 82728 ASSAY OF FERRITIN: CPT

## 2024-10-17 PROCEDURE — 80061 LIPID PANEL: CPT

## 2024-10-17 PROCEDURE — 6370000000 HC RX 637 (ALT 250 FOR IP): Performed by: STUDENT IN AN ORGANIZED HEALTH CARE EDUCATION/TRAINING PROGRAM

## 2024-10-17 PROCEDURE — 2580000003 HC RX 258

## 2024-10-17 PROCEDURE — 82607 VITAMIN B-12: CPT

## 2024-10-17 PROCEDURE — 82746 ASSAY OF FOLIC ACID SERUM: CPT

## 2024-10-17 PROCEDURE — 6370000000 HC RX 637 (ALT 250 FOR IP)

## 2024-10-17 PROCEDURE — 99232 SBSQ HOSP IP/OBS MODERATE 35: CPT | Performed by: INTERNAL MEDICINE

## 2024-10-17 PROCEDURE — 2580000003 HC RX 258: Performed by: STUDENT IN AN ORGANIZED HEALTH CARE EDUCATION/TRAINING PROGRAM

## 2024-10-17 PROCEDURE — 83550 IRON BINDING TEST: CPT

## 2024-10-17 PROCEDURE — 94660 CPAP INITIATION&MGMT: CPT

## 2024-10-17 PROCEDURE — 84132 ASSAY OF SERUM POTASSIUM: CPT

## 2024-10-17 PROCEDURE — 85027 COMPLETE CBC AUTOMATED: CPT

## 2024-10-17 PROCEDURE — 82550 ASSAY OF CK (CPK): CPT

## 2024-10-17 PROCEDURE — 6360000002 HC RX W HCPCS: Performed by: STUDENT IN AN ORGANIZED HEALTH CARE EDUCATION/TRAINING PROGRAM

## 2024-10-17 PROCEDURE — 2060000000 HC ICU INTERMEDIATE R&B

## 2024-10-17 PROCEDURE — 80048 BASIC METABOLIC PNL TOTAL CA: CPT

## 2024-10-17 PROCEDURE — 36415 COLL VENOUS BLD VENIPUNCTURE: CPT

## 2024-10-17 PROCEDURE — 82962 GLUCOSE BLOOD TEST: CPT

## 2024-10-17 PROCEDURE — 83540 ASSAY OF IRON: CPT

## 2024-10-17 RX ADMIN — CLOPIDOGREL BISULFATE 75 MG: 75 TABLET ORAL at 10:19

## 2024-10-17 RX ADMIN — SODIUM CHLORIDE, PRESERVATIVE FREE 10 ML: 5 INJECTION INTRAVENOUS at 00:05

## 2024-10-17 RX ADMIN — ASPIRIN 81 MG: 81 TABLET, COATED ORAL at 10:20

## 2024-10-17 RX ADMIN — BRIMONIDINE TARTRATE 1 DROP: 2 SOLUTION/ DROPS OPHTHALMIC at 20:35

## 2024-10-17 RX ADMIN — CARVEDILOL 12.5 MG: 6.25 TABLET, FILM COATED ORAL at 10:19

## 2024-10-17 RX ADMIN — CEFTRIAXONE SODIUM 1000 MG: 1 INJECTION, POWDER, FOR SOLUTION INTRAMUSCULAR; INTRAVENOUS at 14:25

## 2024-10-17 RX ADMIN — ASPIRIN 81 MG: 81 TABLET, COATED ORAL at 20:32

## 2024-10-17 RX ADMIN — SODIUM CHLORIDE, PRESERVATIVE FREE 10 ML: 5 INJECTION INTRAVENOUS at 20:33

## 2024-10-17 RX ADMIN — CARVEDILOL 12.5 MG: 6.25 TABLET, FILM COATED ORAL at 17:44

## 2024-10-17 RX ADMIN — ATORVASTATIN CALCIUM 20 MG: 20 TABLET, FILM COATED ORAL at 10:20

## 2024-10-17 RX ADMIN — BRIMONIDINE TARTRATE 1 DROP: 2 SOLUTION/ DROPS OPHTHALMIC at 10:20

## 2024-10-17 RX ADMIN — PANTOPRAZOLE SODIUM 40 MG: 40 TABLET, DELAYED RELEASE ORAL at 10:20

## 2024-10-17 RX ADMIN — SODIUM CHLORIDE, PRESERVATIVE FREE 5 ML: 5 INJECTION INTRAVENOUS at 10:29

## 2024-10-17 RX ADMIN — INSULIN GLARGINE 14 UNITS: 100 INJECTION, SOLUTION SUBCUTANEOUS at 20:32

## 2024-10-17 ASSESSMENT — PAIN SCALES - GENERAL: PAINLEVEL_OUTOF10: 0

## 2024-10-17 NOTE — CONSULTS
CARDIOLOGY CONSULTATION    Patient Name:  Aubrey Up    :  1940    Reason for Consultation:   Abnormal troponin    History of Present Illness:   Aubrey Up presents to [facility] following transfer from [facility] having lost his balance and falling to the floor approximately 3 days ago.  He and his family deny that he lost consciousness but unfortunately his wife could not pick him up and he remained in place for at least 45 minutes until his son-in-law treated pick him up and bring him to his bed.  He then remained at home for the next 48 hours but was not recovering well and thus was sent to the emergency room at [facility].  While there his laboratory data revealed an elevated troponin yet the patient absolutely denies any history of chest jaw arm nor intrascapular discomfort other than soreness in the muscles from where he fell.  Likewise he has been having some difficulty breathing and has significant soreness in his arms and lower extremities secondary to the fall.  He does have a rather complex medical history in which he sustained a cerebral vascular accident as well as having a history of supraventricular tachycardia for which she is on medical therapy.  Also of note was that there was significant mental confusion asking his wife and son-in-law where his dog was and they do not have a dog.  Additionally he does have significant renal dysfunction..  He absolutely denies any recent retrosternal discomfort prior or since the fall.  He denies any hemoptysis.  He now presents for further observation and diagnostic testing.    Past Medical History:   has a past medical history of Acute respiratory failure (HCC), CVA (cerebral vascular accident) ([facility]), Diabetes mellitus (HCC), Dysphagia, Gastroparesis, H/O asbestosis, History of paroxysmal supraventricular tachycardia, Neuropathy, Prostate CA (HCC), Sleep apnea with use of  palpitations, loss of consciousness, no phlebitis, no claudication.  Respiratory: No cough or wheezing, no sputum production. No hemoptysis, pleuritic pain.  Gastrointestinal: No abdominal pain, appetite loss, blood in stools. No change in bowel habits. No hematemesis  Genitourinary: No dysuria, trouble voiding or hematuria. No nocturia or increased frequency.  Musculoskeletal:  No gait disturbance, weakness or joint complaints.  Integumentary: No rash or pruritis.  Neurological: No headache, diplopia, change in muscle strength, numbness or tingling. No change in gait, balance, coordination, mood, affect, memory, mentation, behavior.  Psychiatric: No anxiety or depression.  + Confusion  Endocrine: No temperature intolerance. No excessive thirst, fluid intake, or urination. No tremor.  Hematologic/Lymphatic: No abnormal bruising or bleeding, blood clots or swollen lymph nodes.  Allergic/Immunologic: No nasal congestion or hives.    Physical Examination:    Vital Signs: BP (!) 120/58   Pulse 69   Temp 97 °F (36.1 °C) (Tympanic)   Resp 20   Ht 1.727 m (5' 8\")   Wt 83 kg (182 lb 15.7 oz)   SpO2 100%   BMI 27.82 kg/m²   General appearance: Well preserved, mesomorphic body habitus, alert, no distress.  Skin: Skin color, texture, turgor normal. No rashes or lesions. No induration or tightening palpated.  Head: Normocephalic. No masses, lesions, tenderness or abnormalities  Eyes: Conjunctivae/corneas clear. PERRL, EOMs intact. Sclera non icteric.  Ears: External ears normal. Canals clear. TM's clear bilaterally. Hearing normal to finger rub.  Nose/Sinuses: Nares normal. Septum midline. Mucosa normal. No drainage or sinus tenderness.  Oropharynx: Lips, mucosa, and tongue normal. Oropharynx clear with no exudate seen.  Neck: Neck supple and symmetric. No adenopathy. Thyroid symmetric, normal size, without nodules. Trachea is midline. Carotids brisk in upstroke without bruits, no abnormal JVP noted at 45°.  Chest: Even  excursion  Lungs: Lungs clear to auscultation bilaterally. No retractions or use of accessory muscles. No tactile vocal fremitus. No rhonchi, crackles or rales.  Heart:  S1 > S2.  Regular rhythm. No gallop or murmur. No rub, palpable thrill or heave noted. PMI 5th intercostal space midclavicular line.  Abdomen: Abdomen soft, moderately protuberant, non-tender. BS normal. No masses, organomegaly. No hernia noted.  Extremities: Extremities normal. No deformities, edema, or skin discoloration.  No cyanosis or clubbing noted to the nails. Peripheral pulses present 2+ upper extremities and present 1+  lower extremities.  Musculoskeletal: Spine ROM normal. Muscular strength intact.  Neuro: Cranial nerves intact. Motor: Strength 5/5 in all extremities. Reflexes 2+ in all extremities. No focal weakness. Sensory: grossly normal to touch. Coordination intact.    Pertinent Labs:  CBC:   Recent Labs     10/16/24  0045 10/16/24  0546   WBC 8.6 8.5   HGB 8.7* 8.8*    234     BMP:  Recent Labs     10/16/24  0546 10/16/24  1936    136   K 4.6 4.7    105   CO2 24 22   BUN 43* 40*   CREATININE 2.8* 2.5*   GLUCOSE 124* 85   LABGLOM 22* 26*     ABGs:     INR: No results for input(s): \"INR\" in the last 72 hours.  PRO-BNP:   Lab Results   Component Value Date    PROBNP 3,879 (H) 08/07/2024      Cardiac Injury Profile:   Recent Labs     10/16/24  0045 10/16/24  0208 10/16/24  0810   TROPHS 281* 267* 261*      Lipid Profile:   Lab Results   Component Value Date/Time    TRIG 134 10/17/2024 06:33 AM    HDL 28 10/17/2024 06:33 AM    CHOL 94 10/17/2024 06:33 AM      Thyroid:   Lab Results   Component Value Date    TSH 2.35 10/16/2024      Hemoglobin A1C: No components found for: \"HGBA1C\"   ECG:  See Report    Radiology:  XR CHEST PORTABLE    Result Date: 10/16/2024  No acute process. Bilateral calcified pleural plaques.     Assessment:    Patient Active Problem List   Diagnosis Code    Essential hypertension I10    Type 2

## 2024-10-17 NOTE — FLOWSHEET NOTE
10/17/24 0957   Output (mL)   Urine 600 mL   Urine Assessment   Urinary Status Voiding   Urinary Incontinence Absent   Urine Color Yellow/straw   Urine Appearance Cloudy   Bladder Scan Volume (mL) 25 mL   $ Bladder scan $ Yes

## 2024-10-17 NOTE — PROGRESS NOTES
Jah Palomo M.D.,[facility]  Dandre Ceja D.O., ANN., [facility]  Trent Live M.D.  Dawn De La Torre M.D.   Alex Snowden D.O.  Enrique Lucas M.D.         Daily Pulmonary Progress Note    Patient:  Aubrey Up 83 y.o. male MRN: [id number]            Synopsis     We are following patient for hypercapnic respiratory failure    \"CC\" confusion    Code status: full code      Subjective      Patient was seen and examined lying in bed on 4 L nasal cannula, SpO2 100%.  I weaned him down to 2 L.  He is completely alert and oriented this morning and is feeling pretty good.  He did wear the NIV and repeat blood gases yesterday after the setting change showed compensation with a pH of 7.32 and CO2 47.  ABGs this morning reflected pH of 7.36, pCO2 34.8.  I had decreased the rate on the NIV.  Family members present at the bedside.      Review of Systems:  Constitutional: Denies fever, weight loss, night sweats, and fatigue  Skin: Denies pigmentation, dark lesions, and rashes   HEENT: Denies hearing loss, tinnitus, ear drainage, epistaxis, sore throat, and hoarseness.  Cardiovascular: Denies palpitations, chest pain, and chest pressure.  Respiratory: Denies cough, dyspnea at rest, hemoptysis, apnea, and choking.  Gastrointestinal: Denies nausea, vomiting, poor appetite, diarrhea, heartburn or reflux  Genitourinary: Denies dysuria, frequency, urgency or hematuria  Musculoskeletal: Denies myalgias, muscle weakness, and bone pain  Neurological: Denies dizziness, vertigo, headache, and focal weakness  Psychological: Denies anxiety and depression  Endocrine: Denies heat intolerance and cold intolerance  Hematopoietic/Lymphatic: Denies bleeding problems and blood transfusions    24-hour events:  Improvement in mentation    Objective   OBJECTIVE:   BP (!) 120/58   Pulse 69   Temp 97 °F (36.1 °C) (Tympanic)   Resp 20   Ht 1.727 m (5' 8\")   Wt 83 kg (182 lb 15.7 oz)   SpO2 100%   BMI 27.82 kg/m²   SpO2 Readings  disease a non invasive home ventilator is medically necessary to aid in the management of his severe condition. Without this advanced therapy this patient is at high risk for a life threatening exacerbation of hypercapnic respiratory failure requiring frequent hospital readmissions.      This plan of care was reviewed in collaboration with Dr. Snowden    Electronically signed by NAZARIO Pineda CNP on 10/17/2024 at 9:29 AM      I personally saw, examined, and cared for the patient. I performed the substantive portion of the visit. Labs, medications, radiographs reviewed. I agree with history exam and plans detailed in NP note.    Clinically improved.    Would benefit from NIV as outpatient.  We will arrange through case management.    Alex Snowden, DO

## 2024-10-17 NOTE — PROGRESS NOTES
BRIEF SUMMARY OF INITIAL CONSULT:    Briefly Mr. Aubrey Up is an 83-year-old male with a PMH of CKD stage IV, due to ischemic ATN with previous need of renal replacement therapy (CRRT and transition to hemodialysis) with recovery of renal function enough to discontinue dialysis, dialysis membrane allergy, HTN, type II DM, paroxysmal SVT, ANIBAL on CPAP, asbestosis, prostate cancer, CVA, who was admitted on 10/16/2024 after he was transferred to [facility] from [facility] where he initially presented with altered mental status, CT scan of the brain showed only age related changes, his EKG showed ST elevation with elevated troponin level at 322, repeat value was >500 consistent with NSTEMI, reason for his transferring to this center for further evaluation.     IMPRESSION/RECOMMENDATIONS:      MAMI stage I on CKD stage IIIb-IV, volume responsive MAMI. MAMI improving; Creatinine down to 2.3 mg/dL  CKD stage IIIb-IV, 2/2 ischemic ATN in July 2024, creatinine plateaued  at 2.1-2.3 mg/dL  HTN, carvedilol   Normocytic anemia, Ferritin 103; Iron, iron %, TIBC pending, obtain B12 and folate  ------------------------------------------------  CAD, NSTEMI, heparin gtt, clopidogrel, carvedilol, ASA  Acute hypoxic and hypercapnic respiratory failure  Type II DM, SSI  BPH, on tamsulosin  HLD, on atorvastatin   ANIBAL on BiPAP  History of asbestosis   History of pulmonary nodules   History of prostate cancer   Nutrition, S/P PEG tube, NPO        Plan:     Continue NS at 100 ml/hr  Hold lisinopril   Continue carvedilol 12.5 mg BID  Monitor renal function   Monitor blood pressure   Obtain B12 and folate

## 2024-10-17 NOTE — PROGRESS NOTES
Neuro Inpatient Follow Up Note       Aubrey Up is a 83 y.o. right handed male     Neuro is following for confusion    PMH: History of CVA, diabetes, neuropathy, prostate CA, ANIBAL, acute respiratory failure    Patient presented to ED from [place] for confusion and auditory and visual hallucinations.  Patient had been confused since Saturday after falling and hitting his head.  Patient did not lose consciousness but confusion has been waxing and waning since then.  Patient had a CT head completed at [place] which was negative for acute pathology.  Patient was transferred here for cardiology evaluation after abnormal EKG and troponins.  UA positive for UTI and patient had acute hypoxic and hypercapnic respiratory failure since being here.  Nephrology and pulmonary are following.    According to specialist in the room with me during my exam patient is much better today than before.  Report of dysarthria and confusion yesterday, patient is oriented with clear speech this morning.    Patient's daughter is present at bedside and provides additional history.    Vital signs at present time are stable on O2 via nasal cannula    Objective:     BP (!) 118/53   Pulse 71   Temp 98.3 °F (36.8 °C) (Oral)   Resp 20   Ht 1.727 m (5' 8\")   Wt 83 kg (182 lb 15.7 oz)   SpO2 99%   BMI 27.82 kg/m²     General appearance: alert, appears stated age, cooperative and no distress  Head: normocephalic, without obvious abnormality, atraumatic  Eyes: conjunctivae/corneas clear. .  Neck:  supple, symmetrical, trachea midline   Lungs: Unlabored breaths on O2 via NC  Heart: regular rate and rhythm  Extremities: normal, atraumatic, no cyanosis or edema  Pulses: 2+ and symmetric  Skin: color, texture, turgor normal---no rashes or lesions      Mental Status: Awake, alert and oriented x 4, some confusion about events leading up to admission, follows commands    Appropriate attention/concentration  Intact fundus of knowledge  Intact  11:06 AM    GLUCOSE 94 10/17/2024 06:33 AM    GLUCOSE 99 05/20/2021 10:57 AM    CALCIUM 8.7 10/17/2024 06:33 AM    BILITOT 0.7 08/19/2024 03:50 AM    ALKPHOS 158 08/19/2024 03:50 AM    AST 27 08/19/2024 03:50 AM    ALT 22 08/19/2024 03:50 AM     XR CHEST PORTABLE   Final Result   No acute process.      Bilateral calcified pleural plaques.         MRI BRAIN WO CONTRAST    (Results Pending)        All pertinent labs and images personally reviewed today    Assessment:     Patient presented with complaint of confusion in the setting of multiple medical issues   There was also report of dysarthria on arrival as well.   At present patient is oriented x 4, following commands without dysarthria; some confusion as to the events leading up to this admission according to patient's daughter but he is doing much better   Will await MRI brain and thankfully patient is improved today compared to yesterday    Anemia    H&H 7.7/25.5; 8.8 yesterday.  Monitoring per PCP    UTI   Continue Rocephin daily     Plan:     Continue supportive care  Await MRI brain  Complete echo pending   Continue to treat other medical issues  Continue on aspirin and Plavix with statin  Continue telemetry      NAZARIO Baez - NP,   1:33 PM  10/17/2024

## 2024-10-17 NOTE — PLAN OF CARE
Problem: Chronic Conditions and Co-morbidities  Goal: Patient's chronic conditions and co-morbidity symptoms are monitored and maintained or improved  Outcome: Progressing  Flowsheets (Taken 10/16/2024 2045)  Care Plan - Patient's Chronic Conditions and Co-Morbidity Symptoms are Monitored and Maintained or Improved: Monitor and assess patient's chronic conditions and comorbid symptoms for stability, deterioration, or improvement     Problem: Discharge Planning  Goal: Discharge to home or other facility with appropriate resources  Outcome: Progressing  Flowsheets (Taken 10/16/2024 2045)  Discharge to home or other facility with appropriate resources: Identify barriers to discharge with patient and caregiver     Problem: Skin/Tissue Integrity  Goal: Absence of new skin breakdown  Description: 1.  Monitor for areas of redness and/or skin breakdown  2.  Assess vascular access sites hourly  3.  Every 4-6 hours minimum:  Change oxygen saturation probe site  4.  Every 4-6 hours:  If on nasal continuous positive airway pressure, respiratory therapy assess nares and determine need for appliance change or resting period.  Outcome: Progressing     Problem: ABCDS Injury Assessment  Goal: Absence of physical injury  Outcome: Progressing     Problem: Safety - Adult  Goal: Free from fall injury  Outcome: Progressing     Problem: Pain  Goal: Verbalizes/displays adequate comfort level or baseline comfort level  Outcome: Progressing  Flowsheets  Taken 10/16/2024 2315  Verbalizes/displays adequate comfort level or baseline comfort level: Encourage patient to monitor pain and request assistance  Taken 10/16/2024 2045  Verbalizes/displays adequate comfort level or baseline comfort level: Encourage patient to monitor pain and request assistance

## 2024-10-17 NOTE — PROGRESS NOTES
Department of Internal Medicine  Nephrology Progress Note      Events reviewed.     SUBJECTIVE: We are following Mr. Aubrey Up for MAMI on CKD. Patient reports no new complaints today.     PHYSICAL EXAM:      Vitals:    VITALS:  BP (!) 118/53   Pulse 71   Temp 98.3 °F (36.8 °C) (Oral)   Resp 20   Ht 1.727 m (5' 8\")   Wt 83 kg (182 lb 15.7 oz)   SpO2 99%   BMI 27.82 kg/m²   24HR BLOOD PRESSURE RANGE:  Systolic (24hrs), Av , Min:102 , Max:128   ; Diastolic (24hrs), Av, Min:52, Max:74    24HR INTAKE/OUTPUT:    Intake/Output Summary (Last 24 hours) at 10/17/2024 1144  Last data filed at 10/17/2024 1029  Gross per 24 hour   Intake 5 ml   Output 1135 ml   Net -1130 ml       Constitutional:  lethargic, confused, on BiPAP  HEENT:  PERRLA   Respiratory:  diminished   Cardiovascular/Edema:  RRR  Gastrointestinal:    Neurologic:  baseline   Skin:  warm, dry  Other:  no edema    Scheduled Meds:   aspirin  81 mg Oral BID    atorvastatin  20 mg Per NG tube Daily    brimonidine  1 drop Right Eye BID    carvedilol  12.5 mg Per NG tube BID WC    clopidogrel  75 mg Oral Daily    pantoprazole  40 mg Oral Daily    sodium chloride flush  5-40 mL IntraVENous 2 times per day    insulin lispro  0-8 Units SubCUTAneous 4x Daily AC & HS    insulin glargine  14 Units SubCUTAneous Nightly    sodium chloride  1,000 mL IntraVENous Once    cefTRIAXone (ROCEPHIN) IV  1,000 mg IntraVENous Q24H     Continuous Infusions:   dextrose      sodium chloride      sodium chloride 100 mL/hr at 10/16/24 2315     PRN Meds:.perflutren lipid microspheres, acetaminophen, ipratropium 0.5 mg-albuterol 2.5 mg, glucose, dextrose bolus **OR** dextrose bolus, glucagon (rDNA), dextrose, sodium chloride flush, sodium chloride, potassium chloride **OR** potassium alternative oral replacement **OR** potassium chloride, magnesium sulfate, ondansetron **OR** ondansetron, acetaminophen **OR** acetaminophen, polyethylene glycol, aluminum & magnesium  Debo from [facility] where he initially presented with altered mental status, CT scan of the brain showed only age related changes, his EKG showed ST elevation with elevated troponin level at 322, repeat value was >500 consistent with NSTEMI, reason for his transferring to this center for further evaluation.    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 Renal function improving with IVF, creatinine 2.3 mg/dL today, to continue IVF    CKD stage IIIb-IV, 2/2 ischemic ATN in July 2024, creatinine plateaued  at 2.1-2.3 mg/dL  HTN, carvedilol   Normocytic anemia, obtain iron studies, B12 and folate  ------------------------------------------------  CAD, NSTEMI, heparin gtt, clopidogrel, carvedilol, ASA  Acute hypoxic and hypercapnic respiratory failure  Type II DM, SSI  BPH, on tamsulosin  HLD, on atorvastatin   ANIBAL on BiPAP  History of asbestosis   History of pulmonary nodules   History of prostate cancer   Nutrition, S/P PEG tube, NPO      Plan:    Continue NS at 100 ml/hr  Hold lisinopril   Continue carvedilol 12.5 mg BID  Monitor renal function   Monitor blood pressure       NAZARIO Mays - CNP      I saw and evaluated the patient, performing the key elements of the service. I discussed the findings, assessment and plan with NP and agree with her findings and plans as documented in her note.        Jaspal Franklin MD

## 2024-10-17 NOTE — PROGRESS NOTES
[facility] hospitalist  Hospitalist Progress Note      SYNOPSIS: Patient admitted on 10/16/2024  transfer from [facility] because of possible NSTEMI.  Patient presented there with confusion and auditory and visual hallucinations.  Patient was started on heparin cardiology consulted.  MRI of the brain was ordered to rule out stroke.      SUBJECTIVE:  Patient is awake and feeling better  Mentation has improved  Wife is by the bedside  He has no chest pain  Patient seen and examined  Records reviewed.         Stable overnight. No other overnight issues reported.   Temp (24hrs), Av.2 °F (36.2 °C), Min:96.9 °F (36.1 °C), Max:98.3 °F (36.8 °C)    DIET: ADULT DIET; Regular; 4 carb choices (60 gm/meal); Low Fat/Low Chol/High Fiber/MIRANDA  CODE: Full Code    Intake/Output Summary (Last 24 hours) at 10/17/2024 1221  Last data filed at 10/17/2024 1029  Gross per 24 hour   Intake 125 ml   Output 1135 ml   Net -1010 ml       OBJECTIVE:    BP (!) 118/53   Pulse 71   Temp 98.3 °F (36.8 °C) (Oral)   Resp 20   Ht 1.727 m (5' 8\")   Wt 83 kg (182 lb 15.7 oz)   SpO2 99%   BMI 27.82 kg/m²     General appearance: No apparent distress, appears stated age and cooperative.  HEENT:  Conjunctivae/corneas clear.   Neck: Supple. No jugular venous distention.   Respiratory: Clear to auscultation bilaterally, normal respiratory effort  Cardiovascular: Regular rate rhythm, normal S1-S2  Abdomen: Soft, nontender, nondistended  Musculoskeletal: No clubbing, cyanosis, no bilateral lower extremity edema. Brisk capillary refill.   Skin:  No rashes  on visible skin  Neurologic: awake, alert and following commands     ASSESSMENT:    Elevated troponin cardiology feeling this is not indicative of a non-STEMI  Confusion visual and auditory hallucinations  Diabetes mellitus type 2  Gastroparesis  Dysphagia  History of asbestosis  History of prostate cancer     PLAN:    Await MRI  Cardiology following  Patient is off heparin        DISPOSITION: Pending  04/03/2024    LABA1C 6.0 (H) 10/16/2024       Radiology: REVIEWED DAILY    +++++++++++++++++++++++++++++++++++++++++++++++++  Mikael Rubalcava MD  [facility]  [place], [place]  +++++++++++++++++++++++++++++++++++++++++++++++++  NOTE: This report was transcribed using voice recognition software. Every effort was made to ensure accuracy; however, inadvertent computerized transcription errors may be present.

## 2024-10-17 NOTE — CARE COORDINATION
Met with pt, spouse, and son at bedside, pt is from home, recently discharged from [facility], prior to [facility] he was at [facility]. Spouse and pt live in a one story home, has a hospital bed, bedside commode, bed pan, wheelchair, and triangle. Pt has no hhc, he was ambulating with walker independently.  Pt had fallen at home, per son was doing well for a few days after fall, then would not wake up. During interview pt was on bipap, and pleasantly confused. Pt was transferred from [facility] with NSTEMI, on heparin drip and IV rocephin. Pulm and neuro consulted. Will follow for discharge needs. Family would prefer home, but if needed will have pt return to [facility]. 1pm spoke it pulmonary, they will order a NIV for pt. Referral to [facility], after review no qualifying diagnosis, ABG's are not greater than 52. Messaged pulm, PFT's ordered. Charge RN calling for MRI. .Aicha Garcia, MSW, LSW

## 2024-10-18 ENCOUNTER — APPOINTMENT (OUTPATIENT)
Dept: ULTRASOUND IMAGING | Age: 84
DRG: 064 | End: 2024-10-18
Attending: INTERNAL MEDICINE
Payer: MEDICARE

## 2024-10-18 ENCOUNTER — APPOINTMENT (OUTPATIENT)
Age: 84
DRG: 064 | End: 2024-10-18
Attending: INTERNAL MEDICINE
Payer: MEDICARE

## 2024-10-18 LAB
ANION GAP SERPL CALCULATED.3IONS-SCNC: 9 MMOL/L (ref 7–16)
BUN SERPL-MCNC: 28 MG/DL (ref 6–23)
CALCIUM SERPL-MCNC: 8.9 MG/DL (ref 8.6–10.2)
CHLORIDE SERPL-SCNC: 108 MMOL/L (ref 98–107)
CO2 SERPL-SCNC: 24 MMOL/L (ref 22–29)
CREAT SERPL-MCNC: 1.7 MG/DL (ref 0.7–1.2)
ERYTHROCYTE [DISTWIDTH] IN BLOOD BY AUTOMATED COUNT: 16 % (ref 11.5–15)
GFR, ESTIMATED: 39 ML/MIN/1.73M2
GLUCOSE BLD-MCNC: 103 MG/DL (ref 74–99)
GLUCOSE BLD-MCNC: 123 MG/DL (ref 74–99)
GLUCOSE BLD-MCNC: 129 MG/DL (ref 74–99)
GLUCOSE BLD-MCNC: 139 MG/DL (ref 74–99)
GLUCOSE SERPL-MCNC: 100 MG/DL (ref 74–99)
HCT VFR BLD AUTO: 26.3 % (ref 37–54)
HGB BLD-MCNC: 8.5 G/DL (ref 12.5–16.5)
MCH RBC QN AUTO: 30.5 PG (ref 26–35)
MCHC RBC AUTO-ENTMCNC: 32.3 G/DL (ref 32–34.5)
MCV RBC AUTO: 94.3 FL (ref 80–99.9)
PLATELET # BLD AUTO: 217 K/UL (ref 130–450)
PMV BLD AUTO: 10.3 FL (ref 7–12)
POTASSIUM SERPL-SCNC: 4.3 MMOL/L (ref 3.5–5)
RBC # BLD AUTO: 2.79 M/UL (ref 3.8–5.8)
SODIUM SERPL-SCNC: 141 MMOL/L (ref 132–146)
WBC OTHER # BLD: 4.3 K/UL (ref 4.5–11.5)

## 2024-10-18 PROCEDURE — 97161 PT EVAL LOW COMPLEX 20 MIN: CPT

## 2024-10-18 PROCEDURE — 85027 COMPLETE CBC AUTOMATED: CPT

## 2024-10-18 PROCEDURE — 36415 COLL VENOUS BLD VENIPUNCTURE: CPT

## 2024-10-18 PROCEDURE — 2060000000 HC ICU INTERMEDIATE R&B

## 2024-10-18 PROCEDURE — 99233 SBSQ HOSP IP/OBS HIGH 50: CPT | Performed by: NURSE PRACTITIONER

## 2024-10-18 PROCEDURE — 6360000004 HC RX CONTRAST MEDICATION: Performed by: INTERNAL MEDICINE

## 2024-10-18 PROCEDURE — 6370000000 HC RX 637 (ALT 250 FOR IP): Performed by: STUDENT IN AN ORGANIZED HEALTH CARE EDUCATION/TRAINING PROGRAM

## 2024-10-18 PROCEDURE — 80048 BASIC METABOLIC PNL TOTAL CA: CPT

## 2024-10-18 PROCEDURE — 6370000000 HC RX 637 (ALT 250 FOR IP)

## 2024-10-18 PROCEDURE — 6360000002 HC RX W HCPCS: Performed by: STUDENT IN AN ORGANIZED HEALTH CARE EDUCATION/TRAINING PROGRAM

## 2024-10-18 PROCEDURE — 97530 THERAPEUTIC ACTIVITIES: CPT

## 2024-10-18 PROCEDURE — 93880 EXTRACRANIAL BILAT STUDY: CPT

## 2024-10-18 PROCEDURE — C8929 TTE W OR WO FOL WCON,DOPPLER: HCPCS

## 2024-10-18 PROCEDURE — 2580000003 HC RX 258: Performed by: STUDENT IN AN ORGANIZED HEALTH CARE EDUCATION/TRAINING PROGRAM

## 2024-10-18 PROCEDURE — 94660 CPAP INITIATION&MGMT: CPT

## 2024-10-18 PROCEDURE — 97535 SELF CARE MNGMENT TRAINING: CPT

## 2024-10-18 PROCEDURE — 82962 GLUCOSE BLOOD TEST: CPT

## 2024-10-18 PROCEDURE — 97165 OT EVAL LOW COMPLEX 30 MIN: CPT

## 2024-10-18 PROCEDURE — 2700000000 HC OXYGEN THERAPY PER DAY

## 2024-10-18 PROCEDURE — 2580000003 HC RX 258

## 2024-10-18 RX ORDER — HEPARIN SODIUM 5000 [USP'U]/ML
5000 INJECTION, SOLUTION INTRAVENOUS; SUBCUTANEOUS EVERY 8 HOURS
Status: DISCONTINUED | OUTPATIENT
Start: 2024-10-18 | End: 2024-10-23 | Stop reason: HOSPADM

## 2024-10-18 RX ORDER — ATORVASTATIN CALCIUM 40 MG/1
40 TABLET, FILM COATED ORAL DAILY
Status: DISCONTINUED | OUTPATIENT
Start: 2024-10-19 | End: 2024-10-23 | Stop reason: HOSPADM

## 2024-10-18 RX ADMIN — SODIUM CHLORIDE, PRESERVATIVE FREE 10 ML: 5 INJECTION INTRAVENOUS at 08:42

## 2024-10-18 RX ADMIN — SODIUM CHLORIDE: 9 INJECTION, SOLUTION INTRAVENOUS at 00:06

## 2024-10-18 RX ADMIN — BRIMONIDINE TARTRATE 1 DROP: 2 SOLUTION/ DROPS OPHTHALMIC at 20:53

## 2024-10-18 RX ADMIN — INSULIN GLARGINE 14 UNITS: 100 INJECTION, SOLUTION SUBCUTANEOUS at 20:53

## 2024-10-18 RX ADMIN — ATORVASTATIN CALCIUM 20 MG: 20 TABLET, FILM COATED ORAL at 08:39

## 2024-10-18 RX ADMIN — BRIMONIDINE TARTRATE 1 DROP: 2 SOLUTION/ DROPS OPHTHALMIC at 14:47

## 2024-10-18 RX ADMIN — PERFLUTREN 1.5 ML: 6.52 INJECTION, SUSPENSION INTRAVENOUS at 12:47

## 2024-10-18 RX ADMIN — ASPIRIN 81 MG: 81 TABLET, COATED ORAL at 08:39

## 2024-10-18 RX ADMIN — PANTOPRAZOLE SODIUM 40 MG: 40 TABLET, DELAYED RELEASE ORAL at 08:39

## 2024-10-18 RX ADMIN — HEPARIN SODIUM 5000 UNITS: 5000 INJECTION INTRAVENOUS; SUBCUTANEOUS at 14:46

## 2024-10-18 RX ADMIN — SODIUM CHLORIDE, PRESERVATIVE FREE 10 ML: 5 INJECTION INTRAVENOUS at 20:56

## 2024-10-18 RX ADMIN — ASPIRIN 81 MG: 81 TABLET, COATED ORAL at 20:53

## 2024-10-18 RX ADMIN — CLOPIDOGREL BISULFATE 75 MG: 75 TABLET ORAL at 08:40

## 2024-10-18 RX ADMIN — CARVEDILOL 12.5 MG: 6.25 TABLET, FILM COATED ORAL at 08:39

## 2024-10-18 RX ADMIN — CEFTRIAXONE SODIUM 1000 MG: 1 INJECTION, POWDER, FOR SOLUTION INTRAMUSCULAR; INTRAVENOUS at 14:46

## 2024-10-18 RX ADMIN — HEPARIN SODIUM 5000 UNITS: 5000 INJECTION INTRAVENOUS; SUBCUTANEOUS at 20:53

## 2024-10-18 RX ADMIN — CARVEDILOL 12.5 MG: 6.25 TABLET, FILM COATED ORAL at 18:17

## 2024-10-18 ASSESSMENT — PAIN SCALES - GENERAL: PAINLEVEL_OUTOF10: 0

## 2024-10-18 NOTE — PROGRESS NOTES
OCCUPATIONAL THERAPY INITIAL EVALUATION    [facility]  [address], [place]      Date:10/18/2024                                                  Patient Name: Aubrey Up  MRN: [id number]  : 1940  Room: [address] [place] [address]    Evaluating OT: ARGELIA Pepe, OTR/L  # [id number]    Referring Provider:  Edison Bloom MD   Specific Provider Orders:  \"OT Eval and Treat\"  10-18-24    Diagnosis: NSTEMI (non-ST elevated myocardial infarction) ([facility]) [I21.4]    Pt was admitted w/ Confusion, Auditory and Visual Hallucinations.  Found w/ Elevated Troponin, MRI Revealed \"Subcentimeter acute to subacute stroke involving medial margin of right  basal ganglia.\"     Pertinent Medical History:  Pt has a past medical history of Acute respiratory failure ([facility]), CVA (cerebral vascular accident) ([facility]), Diabetes mellitus ([facility]), Dysphagia, Gastroparesis, H/O asbestosis, History of paroxysmal supraventricular tachycardia, Neuropathy, Prostate CA ([facility]), Sleep apnea with use of continuous positive airway pressure (CPAP), and TIA (transient ischemic attack).,  has a past surgical history that includes Prostate biopsy; Knee Arthroplasty; Cholecystectomy; Cervical disc surgery; and Upper gastrointestinal endoscopy (N/A, 2024).    Surgeries this admission: None     Precautions:  Fall Risk  Cognition - Alarms  [place], Jose L hearing aids      Assessment of current deficits   [x] Functional mobility  [x]ADLs  [x] Strength               [x]Cognition   [x] Functional transfers   [x] IADLs         [x] Safety Awareness   [x]Endurance   [] Fine Coordination              [x] Balance     [] Vision/perception   []Sensation    []Gross Motor Coordination  [] ROM  [] Delirium                  [] Motor Control       OT PLAN OF CARE   OT POC based on physician orders, patient diagnosis and results of clinical assessment    Frequency/Duration 1-3 days/wk for 2 weeks PRN   Specific OT  pt on role of OT services.      At the end of the session, patient was properly positioned in /s chair.  Call light and phone within reach, all lines and tubes intact.  Oriented pt to call bell.  Made all appropriate Environmental Modifications to facilitate pt's level of IND and safety.  All needs met.  Notified RN of pt's position and performance. Family at [facility]         Overall patient demonstrated decreased independence and safety during completion of ADL/functional transfer/mobility tasks.  Pt would benefit from continued skilled OT to increase safety and independence with completion of ADL/IADL tasks for functional independence and quality of life.    Treatment: OT treatment provided this date includes:   Instruction/training on safety and adapted techniques for completion of ADLs, use of DME/AD/Adaptive equip;  within precautions   Instruction/training on safe functional mobility/transfer techniques, use of DME/AD;  within precautions      Instruction/training on energy conservation techs (EC)/Work Simplification/PLB for completion of ADLs:     Neuromuscular Reeducation to facilitate balance/righting reactions for increased function with ADLs:    Skilled positioning/alignment to maximize Pt's safety and ability to safely and INDly interact w/ his/her environment, maximize respiratory status  Activity tolerance - Sitting/Standing to improve endurance w/ functional ax   Cognitive retraining -  Oriented pt to current Date, Place and Situation; Cues for safety/safety awareness, sequencing, problem solving    Skilled monitoring of Vitals during session and pt's response to tx ax       Pt/family ed re: benefits of participate in post-acute therapy program vs  OT    Consulted RN, PT - Session completed in collaboration w/ PT for pt's safety    Made all appropriate Environmental Modifications to facilitate pt's level of IND and safety.    Recommendations for Continued Participation in OT services during  Hospitalization and at D/C     Pt and/or Family verbalized/demonstrated a Fair(+) understanding of education provided.  Will Review PRN.         Rehab Potential: Good(-) for established goals     Patient / Family Goal: Not stated at this time      Patient and/or family were instructed on functional diagnosis, prognosis/goals and OT plan of care. Demonstrated Fair(+) understanding.     Eval Complexity: Low    Time In: 1241  Time Out: 1311  Total Treatment Time: 15 minutes    Min Units   OT Eval Low 97165  X  1   OT Eval Medium 19211      OT Eval High 79922      OT Re-Eval 56220       Therapeutic Ex 42653       Therapeutic Activities 84479       ADL/Self Care 98223  15  1   Orthotic Management 09076       Manual 52055     Neuro Re-Ed 82633       Non-Billable Time              Evaluation Time additionally includes thorough review of current medical information, gathering information on past medical history/social history and prior level of function, completion of standardized testing/informal observation of tasks, assessment of data and education on plan of care and goals.            Miya Boyle, MOT, OTR/L  # [id number]

## 2024-10-18 NOTE — PROGRESS NOTES
HOSPITALIST PROGRESS NOTE    Patient's name: Aubrey Up  : 1940  Admission date: 10/16/2024  Date of service: 10/18/2024   Primary care physician: Dionne Singh DO    Assessment   Patient admitted on 10/16/2024     Aubrey Up is a 83 y.o. male patient of Dionne Singh DO with history of CVA, gastroparesis, TIA, sleep apnea on CPAP, history of asbestosis, dysphagia s/p PEG tube placement on tube feeds presented to [facility] on 10/16/2024 from [facility] where he had initially presented with concern of confusion, auditory and visual hallucination.  Patient had a recent prolonged hospitalization for pneumonia and renal failure and was discharged home after rehab in early October.  While at outlying facility patient found to have elevated troponin and patient was transferred to [facility] main campus for NSTEMI and cardiology evaluation.    Possible NSTEMI  Eval by cardiology, troponins have been flat and are downtrending  Heparin has been turned off  Continue aspirin, Plavix, statin  Echocardiogram is still pending.  Cardiology is on board    Acute/subacute right basal ganglia stroke  MRI brain reviewed showing subcentimeter acute to subacute stroke in the right basal ganglia  Patient has no obvious focal deficits, speech is clear  Continue aspirin Plavix and statin  PT/OT.    MAMI  Patient's baseline creatinine seems to be around 2, creatinine elevated 2.8 on admission  Nephrology was consulted  Renal function is improving  Currently on IV fluids    Possible UTI  Treated with IV ceftriaxone while in the hospital.    Obstructive sleep apnea  Restrictive lung disease  Asbestosis  Acute on chronic hypoxic and hypercapnic respiratory failure  Patient would benefit from AVAPS/NIV use at at bedtime and as needed  This patient would benefit from non-invasive therapy, utilizing AVAPS AE to provide a targeted tidal volume. It is therefore required that  (TYLENOL) tablet 650 mg  650 mg Oral Q6H PRN Jag Dodd APRN - CNP        Or    acetaminophen (TYLENOL) suppository 650 mg  650 mg Rectal Q6H PRN Jag Dodd APRN - SARAH        polyethylene glycol (GLYCOLAX) packet 17 g  17 g Oral Daily PRN Jag Dodd APRN - CNP        aluminum & magnesium hydroxide-simethicone (MAALOX) 200-200-20 MG/5ML suspension 30 mL  30 mL Oral Q6H PRN Jag Dodd APRN - CNP        insulin lispro (HUMALOG,ADMELOG) injection vial 0-8 Units  0-8 Units SubCUTAneous 4x Daily AC & HS Jag Dodd APRN - CNP        insulin glargine (LANTUS) injection vial 14 Units  14 Units SubCUTAneous Nightly Jag Dodd APRN - CNP   14 Units at 10/17/24 2032    0.9 % sodium chloride infusion   IntraVENous Continuous Tamiko Michel  mL/hr at 10/18/24 0006 New Bag at 10/18/24 0006    sodium chloride 0.9 % bolus 1,000 mL  1,000 mL IntraVENous Once Tamiko Michel MD        cefTRIAXone (ROCEPHIN) 1,000 mg in sterile water 10 mL IV syringe  1,000 mg IntraVENous Q24H Tamiko Michel MD   1,000 mg at 10/17/24 1425       PRN Medications  perflutren lipid microspheres, acetaminophen, ipratropium 0.5 mg-albuterol 2.5 mg, glucose, dextrose bolus **OR** dextrose bolus, glucagon (rDNA), dextrose, sodium chloride flush, sodium chloride, potassium chloride **OR** potassium alternative oral replacement **OR** potassium chloride, magnesium sulfate, ondansetron **OR** ondansetron, acetaminophen **OR** acetaminophen, polyethylene glycol, aluminum & magnesium hydroxide-simethicone    +++++++++++++++++++++++++++++++++++++++++++++++++  Edison Bloom MD    [place], [place]  +++++++++++++++++++++++++++++++++++++++++++++++++  NOTE: This report was transcribed using voice recognition software. Every effort was made to ensure accuracy; however, inadvertent computerized transcription errors may be present.    I can be reached through [employer].

## 2024-10-18 NOTE — PROGRESS NOTES
Neuro Inpatient Follow Up Note       Aubrey Up is a 83 y.o. right handed male     Neuro is following for confusion    PMH: History of CVA, diabetes, neuropathy, prostate CA, ANIBAL, acute respiratory failure    Patient presented to ED from [facility] for confusion and auditory and visual hallucinations.  Patient had been confused since Saturday after falling and hitting his head.  Patient did not lose consciousness but confusion has been waxing and waning since then.  Patient had a CT head completed at [facility] which was negative for acute pathology.  Patient was transferred here for cardiology evaluation after abnormal EKG and troponins.  UA positive for UTI and patient had acute hypoxic and hypercapnic respiratory failure since being here.  Nephrology and pulmonary are following.    Report of dysarthria and confusion on Wednesday, patient is oriented with clear speech on Thursday morning.  Patient is oriented with clear speech again today.  MRI brain was completed which was consistent with right basal ganglia stroke.    Patient's wife and daughter are present at bedside.  Images reviewed, risk factors and plan of care moving forward discussed at length.  All questions and concerns addressed.    Vital signs at present time are stable on O2 via nasal cannula    Objective:     BP (!) 155/69   Pulse 70   Temp 97.3 °F (36.3 °C) (Temporal)   Resp 18   Ht 1.727 m (5' 8\")   Wt 81.6 kg (180 lb)   SpO2 98%   BMI 27.37 kg/m²     General appearance: alert, appears stated age, cooperative and no distress  Head: normocephalic, without obvious abnormality, atraumatic  Eyes: conjunctivae/corneas clear. .  Neck:  supple, symmetrical, trachea midline   Lungs: Unlabored breaths on O2 via NC  Heart: regular rate and rhythm  Extremities: normal, atraumatic, no cyanosis or edema  Pulses: 2+ and symmetric  Skin: color, texture, turgor normal---no rashes or lesions      Mental Status: Awake, alert and oriented x 4, follows  commands    Appropriate attention/concentration  Intact fundus of knowledge  Intact memories    Speech: no dysarthria  Language: no aphasias    Cranial Nerves:  I: smell NA   II: visual acuity  NA   II: visual fields Full to confrontation   II: pupils GREG   III,VII: ptosis None   III,IV,VI: extraocular muscles  EOMI without nystagmus   V: mastication Normal   V: facial light touch sensation  Normal   V,VII: corneal reflex     VII: facial muscle function - upper  Normal   VII: facial muscle function - lower Normal   VIII: hearing Normal   IX: soft palate elevation  Normal   IX,X: gag reflex    XI: trapezius strength  5/5   XI: sternocleidomastoid strength 5/5   XI: neck extension strength  5/5   XII: tongue strength  Normal     Motor:  Grossly 5/5 to uppers, LLE +3/5, RLE 3/5--both drift  Normal bulk and tone  No abnormal movements    Sensory:  LT intact throughout    Coordination:   FN, FFM VICENET intact  HKS intact    Gait:  Not tested    DTR:   Deferred today    No Babinskis  No Marie's    No pathological reflexes    Laboratory/Radiology:     CBC with Differential:    Lab Results   Component Value Date/Time    WBC 4.3 10/18/2024 06:00 AM    RBC 2.79 10/18/2024 06:00 AM    HGB 8.5 10/18/2024 06:00 AM    HCT 26.3 10/18/2024 06:00 AM     10/18/2024 06:00 AM    MCV 94.3 10/18/2024 06:00 AM    MCH 30.5 10/18/2024 06:00 AM    MCHC 32.3 10/18/2024 06:00 AM    RDW 16.0 10/18/2024 06:00 AM    LYMPHOPCT 18 08/19/2024 03:50 AM    MONOPCT 10 08/19/2024 03:50 AM    EOSPCT 6 08/19/2024 03:50 AM    BASOPCT 1 08/19/2024 03:50 AM    MONOSABS 0.63 08/19/2024 03:50 AM    LYMPHSABS 1.10 08/19/2024 03:50 AM    EOSABS 0.35 08/19/2024 03:50 AM    BASOSABS 0.06 08/19/2024 03:50 AM     CMP:    Lab Results   Component Value Date/Time     10/18/2024 06:00 AM    K 4.3 10/18/2024 06:00 AM     10/18/2024 06:00 AM    CO2 24 10/18/2024 06:00 AM    BUN 28 10/18/2024 06:00 AM    CREATININE 1.7 10/18/2024 06:00 AM    GFRAA >60

## 2024-10-18 NOTE — PLAN OF CARE
Problem: Chronic Conditions and Co-morbidities  Goal: Patient's chronic conditions and co-morbidity symptoms are monitored and maintained or improved  10/18/2024 0939 by Romulo Rivera RN  Outcome: Progressing     Problem: Discharge Planning  Goal: Discharge to home or other facility with appropriate resources  10/18/2024 0939 by Romulo Rivera RN  Outcome: Progressing     Problem: Skin/Tissue Integrity  Goal: Absence of new skin breakdown  Description: 1.  Monitor for areas of redness and/or skin breakdown  2.  Assess vascular access sites hourly  3.  Every 4-6 hours minimum:  Change oxygen saturation probe site  4.  Every 4-6 hours:  If on nasal continuous positive airway pressure, respiratory therapy assess nares and determine need for appliance change or resting period.  10/18/2024 0939 by Romulo Rivera RN  Outcome: Progressing     Problem: ABCDS Injury Assessment  Goal: Absence of physical injury  10/18/2024 0939 by Romulo Rivera RN  Outcome: Progressing     Problem: Safety - Adult  Goal: Free from fall injury  10/18/2024 0939 by Romulo Rivera RN  Outcome: Progressing     Problem: Pain  Goal: Verbalizes/displays adequate comfort level or baseline comfort level  10/18/2024 0939 by Romulo Rivera RN  Outcome: Progressing

## 2024-10-18 NOTE — PROGRESS NOTES
Department of Internal Medicine  Nephrology Progress Note      Events reviewed.     SUBJECTIVE: We are following Mr. Aubrey Up for MAMI on CKD. Patient reports no new complaints today.     PHYSICAL EXAM:      Vitals:    VITALS:  BP (!) 155/69   Pulse 70   Temp 97.3 °F (36.3 °C) (Temporal)   Resp 18   Ht 1.727 m (5' 8\")   Wt 81.6 kg (180 lb)   SpO2 98%   BMI 27.37 kg/m²   24HR BLOOD PRESSURE RANGE:  Systolic (24hrs), Av , Min:128 , Max:165   ; Diastolic (24hrs), Av, Min:60, Max:69    24HR INTAKE/OUTPUT:    Intake/Output Summary (Last 24 hours) at 10/18/2024 1442  Last data filed at 10/18/2024 0800  Gross per 24 hour   Intake 120 ml   Output 300 ml   Net -180 ml       Constitutional:  lethargic, confused, on BiPAP  HEENT:  PERRLA   Respiratory:  diminished   Cardiovascular/Edema:  RRR  Gastrointestinal:    Neurologic:  baseline   Skin:  warm, dry  Other:  no edema    Scheduled Meds:   [START ON 10/19/2024] atorvastatin  40 mg Per NG tube Daily    heparin (porcine)  5,000 Units SubCUTAneous Q8H    aspirin  81 mg Oral BID    brimonidine  1 drop Right Eye BID    carvedilol  12.5 mg Per NG tube BID WC    clopidogrel  75 mg Oral Daily    pantoprazole  40 mg Oral Daily    sodium chloride flush  5-40 mL IntraVENous 2 times per day    insulin lispro  0-8 Units SubCUTAneous 4x Daily AC & HS    insulin glargine  14 Units SubCUTAneous Nightly    sodium chloride  1,000 mL IntraVENous Once    cefTRIAXone (ROCEPHIN) IV  1,000 mg IntraVENous Q24H     Continuous Infusions:   dextrose      sodium chloride       PRN Meds:.acetaminophen, ipratropium 0.5 mg-albuterol 2.5 mg, glucose, dextrose bolus **OR** dextrose bolus, glucagon (rDNA), dextrose, sodium chloride flush, sodium chloride, potassium chloride **OR** potassium alternative oral replacement **OR** potassium chloride, magnesium sulfate, ondansetron **OR** ondansetron, acetaminophen **OR** acetaminophen, polyethylene glycol, aluminum & magnesium  Debo from [facility] where he initially presented with altered mental status, CT scan of the brain showed only age related changes, his EKG showed ST elevation with elevated troponin level at 322, repeat value was >500 consistent with NSTEMI, reason for his transferring to this center for further evaluation.    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 Renal function improving with IVF, creatinine 1.7 mg/dL today    CKD stage IIIb-IV, 2/2 ischemic ATN in July 2024, creatinine plateaued  at 2.1-2.3 mg/dL  HTN, carvedilol   Normocytic anemia, obtain iron studies, B12 and folate  ------------------------------------------------  CAD, NSTEMI, heparin gtt, clopidogrel, carvedilol, ASA  Acute hypoxic and hypercapnic respiratory failure  Type II DM, SSI  BPH, on tamsulosin  HLD, on atorvastatin   ANIBAL on BiPAP  History of asbestosis   History of pulmonary nodules   History of prostate cancer   Nutrition, S/P PEG tube, NPO      Plan:    Stop NS  Hold lisinopril   Continue carvedilol 12.5 mg BID  Monitor renal function   Monitor blood pressure       NAZARIO Masy - CNP      I saw and evaluated the patient, performing the key elements of the service. I discussed the findings, assessment and plan with NP and agree with her findings and plans as documented in her note.        Jaspal Franklin MD

## 2024-10-18 NOTE — CARE COORDINATION
Reviewed chart, per therapy pt can go home with [facility], met with wife who states that Vinayak was coming into home, the only contact that can be found is outpatient, will follow up with wife. Spoke with pulmonary, pt has a sleep study pending and PFT's pending, pt can not discharge home until NIV is approved. Pt's wife chose [facility] since [facility] is outpatient, referral to Mary at [facility], anticipate discharge beginning of week.Aicha Garcia, MSW, LSW

## 2024-10-18 NOTE — PROGRESS NOTES
Physical Therapy  Physical Therapy Initial Assessment     Name: Aubrey Up  : 1940  MRN: [id number]      Date of Service: 10/18/2024    Evaluating PT:  Bi Talavera PT, DPT    Room #:  7415/7415-A  Diagnosis:  NSTEMI (non-ST elevated myocardial infarction) (HCC) [I21.4]  PMHx/PSHx:  acute respiratory failure, CVA, DM, dysphagia, gastroparesis, neuropathy, TIA, ANIBAL  Procedure/Surgery:  N/A  Precautions:  falls, [place], O2   Equipment Owned: walker, rollator, wc  Equipment Needs:  TBD    SUBJECTIVE:    Pt lives with wife in a 1 story home with 2 steps to enter and 1 handrail.  Bed/bath is on main floor.  Pt ambulated with walker PTA.    OBJECTIVE:   Initial Evaluation  Date: 10/18/24 Treatment Short Term/ Long Term   Goals   AM-PAC 6 Clicks      Was pt agreeable to Eval/treatment? yes     Does pt have pain? No pain     Bed Mobility  Rolling: NT  Supine to sit: SBA  Sit to supine: NT  Scooting: SBA  Rolling: Ind  Supine to sit: Ind  Sit to supine: Ind  Scooting: Ind   Transfers Sit to stand: [place]  Stand to sit: [place]  Stand pivot: [place] wih ww  Sit to stand: Ind  Stand to sit: Ind  Stand pivot: Ind with ww   Ambulation    25' x2 with ww [place]  150' with ww Ind   Stair negotiation: ascended and descended  NT  2 steps with 1 handrail Ind     Strength/ROM:   BLE gross strength 3/5  BLE ROM WFL    Balance:   Static Sitting: Ind  Dynamic Sitting: SBA  Static Standing: [place] with ww  Dynamic Standing: [place] with ww    Pt is A & O x 4  Sensation:  Pt reports numbness to hands and BLE below the knee  Edema:  None noted    Vitals:  SpO2 and HR were stable during session    Therapeutic Exercises:    Bed mobility: supine>sit, cued for EOB positioning  Transfers: STS x3, cued for hand placement and postural correction  Ambulation: 25' x2, cued for energy conservation, postural correction, and safety  BLE AROM    Patient education  Pt educated on role of PT, importance of functional mobility during hospital stay, and

## 2024-10-18 NOTE — PROGRESS NOTES
Called sleep lab regarding bedside sleep study they do not have person on call for weekends.They will call Monday to make sure patient is still in house to do the sleep study then. Faxed over order and face sheet. Received conformation placed in soft chart.

## 2024-10-18 NOTE — PROGRESS NOTES
Jah Palomo M.D.,[facility]  Dandre Ceja D.O., ANN., [facility]  Trent Live M.D.  Dawn De La Torre M.D.   Alex Snowden D.O.  Enrique Lucas M.D.         Daily Pulmonary Progress Note    Patient:  Aubrey Up 83 y.o. male MRN: [id number]            Synopsis     We are following patient for hypercapnic respiratory failure    \"CC\" confusion    Code status: full code      Subjective      Patient was seen and examined sitting up on the side of the bed.  He is on 2 L nasal cannula.  MRI of the brain was completed indicating evidence of a stroke.  His lungs are clear on exam.  Daughter and wife are present.      Review of Systems:  Constitutional: Denies fever, weight loss, night sweats, and fatigue  Skin: Denies pigmentation, dark lesions, and rashes   HEENT: Denies hearing loss, tinnitus, ear drainage, epistaxis, sore throat, and hoarseness.  Cardiovascular: Denies palpitations, chest pain, and chest pressure.  Respiratory: Denies cough, dyspnea at rest, hemoptysis, apnea, and choking.  Gastrointestinal: Denies nausea, vomiting, poor appetite, diarrhea, heartburn or reflux  Genitourinary: Denies dysuria, frequency, urgency or hematuria  Musculoskeletal: Denies myalgias, muscle weakness, and bone pain  Neurological: Denies dizziness, vertigo, headache, and focal weakness  Psychological: Denies anxiety and depression  Endocrine: Denies heat intolerance and cold intolerance  Hematopoietic/Lymphatic: Denies bleeding problems and blood transfusions    24-hour events:  Brain MRI    Objective   OBJECTIVE:   BP (!) 155/69   Pulse 70   Temp 97.3 °F (36.3 °C) (Temporal)   Resp 18   Ht 1.727 m (5' 8\")   Wt 81.6 kg (180 lb)   SpO2 98%   BMI 27.37 kg/m²   SpO2 Readings from Last 1 Encounters:   10/18/24 98%        I/O:    Intake/Output Summary (Last 24 hours) at 10/18/2024 1437  Last data filed at 10/18/2024 0800  Gross per 24 hour   Intake 120 ml   Output 300 ml   Net -180 ml             IPAP: 16

## 2024-10-19 PROBLEM — I63.9 ACUTE ISCHEMIC STROKE (HCC): Status: ACTIVE | Noted: 2024-10-19

## 2024-10-19 PROBLEM — R44.3 HALLUCINATIONS: Status: ACTIVE | Noted: 2024-10-19

## 2024-10-19 PROBLEM — I65.22 CAROTID STENOSIS, ASYMPTOMATIC, LEFT: Status: ACTIVE | Noted: 2024-10-19

## 2024-10-19 LAB
ECHO AO ASC DIAM: 3 CM
ECHO AO ASCENDING AORTA INDEX: 1.54 CM/M2
ECHO AV AREA PEAK VELOCITY: 2.4 CM2
ECHO AV AREA VTI: 2.3 CM2
ECHO AV AREA/BSA PEAK VELOCITY: 1.2 CM2/M2
ECHO AV AREA/BSA VTI: 1.2 CM2/M2
ECHO AV CUSP MM: 1.9 CM
ECHO AV MEAN GRADIENT: 4 MMHG
ECHO AV MEAN VELOCITY: 0.9 M/S
ECHO AV PEAK GRADIENT: 8 MMHG
ECHO AV PEAK VELOCITY: 1.4 M/S
ECHO AV VELOCITY RATIO: 0.71
ECHO AV VTI: 33 CM
ECHO BSA: 1.98 M2
ECHO EST RA PRESSURE: 3 MMHG
ECHO LA DIAMETER INDEX: 1.79 CM/M2
ECHO LA DIAMETER: 3.5 CM
ECHO LA VOL A-L A2C: 92 ML (ref 18–58)
ECHO LA VOL A-L A4C: 46 ML (ref 18–58)
ECHO LA VOL MOD A2C: 91 ML (ref 18–58)
ECHO LA VOL MOD A4C: 42 ML (ref 18–58)
ECHO LA VOLUME AREA LENGTH: 68 ML
ECHO LA VOLUME INDEX A-L A2C: 47 ML/M2 (ref 16–34)
ECHO LA VOLUME INDEX A-L A4C: 24 ML/M2 (ref 16–34)
ECHO LA VOLUME INDEX AREA LENGTH: 35 ML/M2 (ref 16–34)
ECHO LA VOLUME INDEX MOD A2C: 47 ML/M2 (ref 16–34)
ECHO LA VOLUME INDEX MOD A4C: 22 ML/M2 (ref 16–34)
ECHO LV EDV A2C: 103 ML
ECHO LV EDV A4C: 109 ML
ECHO LV EDV BP: 109 ML (ref 67–155)
ECHO LV EDV INDEX A4C: 56 ML/M2
ECHO LV EDV INDEX BP: 56 ML/M2
ECHO LV EDV NDEX A2C: 53 ML/M2
ECHO LV EF PHYSICIAN: 63 %
ECHO LV EJECTION FRACTION A2C: 69 %
ECHO LV EJECTION FRACTION A4C: 63 %
ECHO LV EJECTION FRACTION BIPLANE: 66 % (ref 55–100)
ECHO LV ESV A2C: 31 ML
ECHO LV ESV A4C: 40 ML
ECHO LV ESV BP: 37 ML (ref 22–58)
ECHO LV ESV INDEX A2C: 16 ML/M2
ECHO LV ESV INDEX A4C: 21 ML/M2
ECHO LV ESV INDEX BP: 19 ML/M2
ECHO LV FRACTIONAL SHORTENING: 31 % (ref 28–44)
ECHO LV INTERNAL DIMENSION DIASTOLE INDEX: 2.31 CM/M2
ECHO LV INTERNAL DIMENSION DIASTOLIC: 4.5 CM (ref 4.2–5.9)
ECHO LV INTERNAL DIMENSION SYSTOLIC INDEX: 1.59 CM/M2
ECHO LV INTERNAL DIMENSION SYSTOLIC: 3.1 CM
ECHO LV ISOVOLUMETRIC RELAXATION TIME (IVRT): 101.5 MS
ECHO LV IVSD: 1.3 CM (ref 0.6–1)
ECHO LV IVSS: 1.3 CM
ECHO LV MASS 2D: 186.4 G (ref 88–224)
ECHO LV MASS INDEX 2D: 95.6 G/M2 (ref 49–115)
ECHO LV POSTERIOR WALL DIASTOLIC: 1 CM (ref 0.6–1)
ECHO LV POSTERIOR WALL SYSTOLIC: 1.4 CM
ECHO LV RELATIVE WALL THICKNESS RATIO: 0.44
ECHO LVOT AREA: 3.5 CM2
ECHO LVOT AV VTI INDEX: 0.69
ECHO LVOT DIAM: 2.1 CM
ECHO LVOT MEAN GRADIENT: 2 MMHG
ECHO LVOT PEAK GRADIENT: 4 MMHG
ECHO LVOT PEAK VELOCITY: 1 M/S
ECHO LVOT STROKE VOLUME INDEX: 40.7 ML/M2
ECHO LVOT SV: 79.3 ML
ECHO LVOT VTI: 22.9 CM
ECHO MV "A" WAVE DURATION: 147.6 MSEC
ECHO MV A VELOCITY: 0.86 M/S
ECHO MV AREA PHT: 2.4 CM2
ECHO MV AREA VTI: 2.5 CM2
ECHO MV E DECELERATION TIME (DT): 261.8 MS
ECHO MV E VELOCITY: 0.88 M/S
ECHO MV E/A RATIO: 1.02
ECHO MV LVOT VTI INDEX: 1.38
ECHO MV MAX VELOCITY: 1 M/S
ECHO MV MEAN GRADIENT: 2 MMHG
ECHO MV MEAN VELOCITY: 0.6 M/S
ECHO MV PEAK GRADIENT: 4 MMHG
ECHO MV PRESSURE HALF TIME (PHT): 92.5 MS
ECHO MV VTI: 31.7 CM
ECHO PV MAX VELOCITY: 0.9 M/S
ECHO PV MEAN GRADIENT: 2 MMHG
ECHO PV MEAN VELOCITY: 0.6 M/S
ECHO PV PEAK GRADIENT: 3 MMHG
ECHO PV VTI: 21.8 CM
ECHO PVEIN A DURATION: 166.1 MS
ECHO PVEIN A VELOCITY: 0.5 M/S
ECHO PVEIN PEAK D VELOCITY: 0.4 M/S
ECHO PVEIN PEAK S VELOCITY: 0.8 M/S
ECHO PVEIN S/D RATIO: 2
ECHO RIGHT VENTRICULAR SYSTOLIC PRESSURE (RVSP): 28 MMHG
ECHO RV INTERNAL DIMENSION: 2.7 CM
ECHO RV TAPSE: 2.5 CM (ref 1.7–?)
ECHO TV REGURGITANT MAX VELOCITY: 2.49 M/S
ECHO TV REGURGITANT PEAK GRADIENT: 25 MMHG
GLUCOSE BLD-MCNC: 135 MG/DL (ref 74–99)
GLUCOSE BLD-MCNC: 161 MG/DL (ref 74–99)
GLUCOSE BLD-MCNC: 177 MG/DL (ref 74–99)
GLUCOSE BLD-MCNC: 84 MG/DL (ref 74–99)

## 2024-10-19 PROCEDURE — 2700000000 HC OXYGEN THERAPY PER DAY

## 2024-10-19 PROCEDURE — 6370000000 HC RX 637 (ALT 250 FOR IP): Performed by: STUDENT IN AN ORGANIZED HEALTH CARE EDUCATION/TRAINING PROGRAM

## 2024-10-19 PROCEDURE — 82962 GLUCOSE BLOOD TEST: CPT

## 2024-10-19 PROCEDURE — 6370000000 HC RX 637 (ALT 250 FOR IP)

## 2024-10-19 PROCEDURE — 6360000002 HC RX W HCPCS: Performed by: STUDENT IN AN ORGANIZED HEALTH CARE EDUCATION/TRAINING PROGRAM

## 2024-10-19 PROCEDURE — 99232 SBSQ HOSP IP/OBS MODERATE 35: CPT | Performed by: NURSE PRACTITIONER

## 2024-10-19 PROCEDURE — 2060000000 HC ICU INTERMEDIATE R&B

## 2024-10-19 PROCEDURE — 2580000003 HC RX 258: Performed by: STUDENT IN AN ORGANIZED HEALTH CARE EDUCATION/TRAINING PROGRAM

## 2024-10-19 PROCEDURE — 94660 CPAP INITIATION&MGMT: CPT

## 2024-10-19 PROCEDURE — 2580000003 HC RX 258

## 2024-10-19 RX ADMIN — HEPARIN SODIUM 5000 UNITS: 5000 INJECTION INTRAVENOUS; SUBCUTANEOUS at 13:55

## 2024-10-19 RX ADMIN — CEFTRIAXONE SODIUM 1000 MG: 1 INJECTION, POWDER, FOR SOLUTION INTRAMUSCULAR; INTRAVENOUS at 13:55

## 2024-10-19 RX ADMIN — HEPARIN SODIUM 5000 UNITS: 5000 INJECTION INTRAVENOUS; SUBCUTANEOUS at 07:49

## 2024-10-19 RX ADMIN — SODIUM CHLORIDE, PRESERVATIVE FREE 10 ML: 5 INJECTION INTRAVENOUS at 21:02

## 2024-10-19 RX ADMIN — CARVEDILOL 12.5 MG: 6.25 TABLET, FILM COATED ORAL at 09:03

## 2024-10-19 RX ADMIN — BRIMONIDINE TARTRATE 1 DROP: 2 SOLUTION/ DROPS OPHTHALMIC at 21:02

## 2024-10-19 RX ADMIN — ASPIRIN 81 MG: 81 TABLET, COATED ORAL at 09:03

## 2024-10-19 RX ADMIN — BRIMONIDINE TARTRATE 1 DROP: 2 SOLUTION/ DROPS OPHTHALMIC at 09:03

## 2024-10-19 RX ADMIN — SODIUM CHLORIDE, PRESERVATIVE FREE 10 ML: 5 INJECTION INTRAVENOUS at 09:03

## 2024-10-19 RX ADMIN — ACETAMINOPHEN 650 MG: 325 TABLET ORAL at 17:06

## 2024-10-19 RX ADMIN — PANTOPRAZOLE SODIUM 40 MG: 40 TABLET, DELAYED RELEASE ORAL at 09:03

## 2024-10-19 RX ADMIN — CLOPIDOGREL BISULFATE 75 MG: 75 TABLET ORAL at 09:03

## 2024-10-19 RX ADMIN — ASPIRIN 81 MG: 81 TABLET, COATED ORAL at 21:02

## 2024-10-19 RX ADMIN — CARVEDILOL 12.5 MG: 6.25 TABLET, FILM COATED ORAL at 17:06

## 2024-10-19 RX ADMIN — HEPARIN SODIUM 5000 UNITS: 5000 INJECTION INTRAVENOUS; SUBCUTANEOUS at 21:02

## 2024-10-19 RX ADMIN — INSULIN GLARGINE 14 UNITS: 100 INJECTION, SOLUTION SUBCUTANEOUS at 21:01

## 2024-10-19 RX ADMIN — ATORVASTATIN CALCIUM 40 MG: 40 TABLET, FILM COATED ORAL at 09:03

## 2024-10-19 ASSESSMENT — PAIN DESCRIPTION - LOCATION: LOCATION: BUTTOCKS;COCCYX

## 2024-10-19 ASSESSMENT — PAIN - FUNCTIONAL ASSESSMENT: PAIN_FUNCTIONAL_ASSESSMENT: PREVENTS OR INTERFERES SOME ACTIVE ACTIVITIES AND ADLS

## 2024-10-19 ASSESSMENT — PAIN DESCRIPTION - DESCRIPTORS: DESCRIPTORS: ACHING;SORE;DISCOMFORT

## 2024-10-19 ASSESSMENT — PAIN DESCRIPTION - ORIENTATION: ORIENTATION: POSTERIOR;LEFT;RIGHT

## 2024-10-19 ASSESSMENT — PAIN SCALES - GENERAL: PAINLEVEL_OUTOF10: 7

## 2024-10-19 NOTE — PROGRESS NOTES
Jah Palomo M.D.  Dandre Ceja D.O.  Trent Live M.D.  Dawn De La Torre M.D.   Alex Snowden D.O.  Enrique Lucas M.D.           Daily Pulmonary Progress Note    Patient:  Aubrey Up 83 y.o. male MRN: [id number]     Date of Service: 10/19/2024        Subjective      Patient was seen and examined.    Son at bedside.  Overall been doing better, however still has some intermittent confusion.  Wearing NIV at night.    Objective   Vitals: /80   Pulse 71   Temp 98.2 °F (36.8 °C) (Temporal)   Resp 17   Ht 1.727 m (5' 8\")   Wt 81.6 kg (180 lb)   SpO2 95%   BMI 27.37 kg/m²     I/O:    Intake/Output Summary (Last 24 hours) at 10/19/2024 1304  Last data filed at 10/19/2024 1245  Gross per 24 hour   Intake 3240 ml   Output 1050 ml   Net 2190 ml       CURRENT MEDS :  Scheduled Meds:   atorvastatin  40 mg Per NG tube Daily    heparin (porcine)  5,000 Units SubCUTAneous Q8H    aspirin  81 mg Oral BID    brimonidine  1 drop Right Eye BID    carvedilol  12.5 mg Per NG tube BID WC    clopidogrel  75 mg Oral Daily    pantoprazole  40 mg Oral Daily    sodium chloride flush  5-40 mL IntraVENous 2 times per day    insulin lispro  0-8 Units SubCUTAneous 4x Daily AC & HS    insulin glargine  14 Units SubCUTAneous Nightly    sodium chloride  1,000 mL IntraVENous Once    cefTRIAXone (ROCEPHIN) IV  1,000 mg IntraVENous Q24H       Continuous Infusions:   dextrose      sodium chloride         PRN Meds:  white petrolatum, acetaminophen, ipratropium 0.5 mg-albuterol 2.5 mg, glucose, dextrose bolus **OR** dextrose bolus, glucagon (rDNA), dextrose, sodium chloride flush, sodium chloride, potassium chloride **OR** potassium alternative oral replacement **OR** potassium chloride, magnesium sulfate, ondansetron **OR** ondansetron, acetaminophen **OR** acetaminophen, polyethylene glycol, aluminum & magnesium hydroxide-simethicone      Physical Exam:  Physical Exam  Constitutional:       General: He is not in acute  distress.  HENT:      Head: Normocephalic and atraumatic.      Mouth/Throat:      Pharynx: No oropharyngeal exudate.   Eyes:      General: No scleral icterus.     Conjunctiva/sclera: Conjunctivae normal.   Neck:      Trachea: No tracheal deviation.   Cardiovascular:      Rate and Rhythm: Normal rate.      Heart sounds: Normal heart sounds.   Pulmonary:      Effort: Pulmonary effort is normal.      Breath sounds: Normal breath sounds.   Abdominal:      Palpations: Abdomen is soft.      Tenderness: There is no abdominal tenderness.   Musculoskeletal:         General: No swelling or deformity.      Cervical back: Neck supple.   Lymphadenopathy:      Cervical: No cervical adenopathy.   Skin:     General: Skin is warm.      Findings: No rash.   Neurological:      General: No focal deficit present.      Mental Status: He is alert and oriented to person, place, and time.   Psychiatric:         Behavior: Behavior normal.         Pertinent/ New Labs and Imaging Studies     Pulmonary Function Testing personally reviewed and interpreted.    PERTINENT LAB RESULTS: Labs reviewed.      DIAGNOSTICS: Pertinent imaging reviewed.         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  Repeat ABGs this morning show much improvement with a pH of 7.36, pCO2 34.8.  Can change NIV use to HS and PRN  CXR 10/16 reviewed-pleural plaques noted  PFTs ordered  Orders placed for home NIV. -discussed with case management. Patient will need device prior to discharge  4-channel bedside sleep study  ordered    This patient has chronic respiratory failure due to restrictive lung disease related to severe pleural plaques. His condition has worsened and his quality of life has deteriorated. A non invasive home ventilator is required at this time as the patient has a significant risk of dying from acute on top of chronic hypercapnic respiratory failure if he doesn't get to the hospital in time. Patient has used Bipap in the past but it has been ineffective in managing his hypercapnic respiratory failure. It is my professional opinion that the patient needs treated with an augmented tidal volume to resolve the CO2 retention that is not available with a Bipap or Bipap ST. This patient would benefit from non-invasive therapy, utilizing AVAPS AE to provide a targeted tidal volume. It is therefore required that due to the severity of his lung disease a non invasive home ventilator is medically necessary to aid in the management of his severe condition. Without this advanced therapy this patient is at high risk for a life threatening exacerbation of hypercapnic respiratory failure requiring frequent hospital readmissions.     Thank you for allowing me to participate in the care of Aubrey Up.   Please feel free to call with questions.     Electronically signed by Alex Snowden DO on 10/19/2024 at 1:04 PM

## 2024-10-19 NOTE — PROGRESS NOTES
PROGRESS NOTE       PATIENT PROBLEM LIST:  Patient Active Problem List   Diagnosis Code    Essential hypertension I10    Type 2 diabetes mellitus (HCC) E11.9    Neuropathy due to type 2 diabetes mellitus ([facility]) E11.40    Mixed hyperlipidemia E78.2    H/O asbestosis Z87.09    Lumbar and sacral osteoarthritis M47.817    History of paroxysmal supraventricular tachycardia Z86.79    Need for diphtheria-tetanus-pertussis (Tdap) vaccine Z23    Vertigo R42    Pulmonary nodules R91.8    Abnormal x-ray of pelvis R93.7    Unsteady gait when walking R26.81    Abnormal radionuclide bone scan R94.8    Hip pain, chronic, right M25.551, G89.29    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, G89.29    History of CVA (cerebrovascular accident) Z86.73    Muscle weakness (generalized) M62.81    Lumbar radiculopathy M54.16    Lumbar pain M54.50    High prostate specific antigen (PSA) R97.20    Neuropathy G62.9    Sprain of medial collateral ligament of right knee S83.411A    Acute pain of right knee M25.561    Disorder of liver K76.9    History of prostate cancer Z85.46    NSTEMI (non-ST elevated myocardial infarction) ([facility]) I21.4    Acute coronary syndrome ([facility]) I24.9    Acute confusional state F05       SUBJECTIVE:  Aubrey Up states he wishes to be discharged this evening and his wife is sitting at his bedside.  He denies any lightheadedness, palpitations, shortness of breath nor chest discomfort.  He emphatically states that he has been up and walking without difficulty and his wife noted that he walked with physical therapy who felt he did not need to go to a rehabilitation center.His radiographic cerebral scans suggest that of possible recent right-sided CVA with remote left-sided infarcts as well.    REVIEW OF SYSTEMS:  General ROS: negative for - fatigue, malaise,  weight gain or weight loss  Psychological ROS: negative for - anxiety , depression  Ophthalmic ROS: negative for - decreased vision or visual distortion.  ENT ROS:  palpable cervical nor supraclavicular nodes.Thyroid not palpable. Trachea midline.  Chest: Even excursion  Lungs: CTA B, no expiratory wheezes or rhonchi, no decreased tactile fremitus without inspiratory rales.  Heart: Regular  rhythm; S1 > S2, no gallop or murmur. No clicks, rub, palpable thrills   or heaves. PMI nondisplaced, 5th intercostal space MCL.   Abdomen: Soft, nontender, nondistended,  moderately protuberant, no masses or organomegaly.  Bowel sounds active.  Extremities: Without clubbing, cyanosis or edema. Pulses present 3+ upper extermities bilaterally; present 1+ DP  bilaterally and present 1+ PT bilaterally.     Data:   Scheduled Meds: Reviewed  Continuous Infusions:    dextrose      sodium chloride         Intake/Output Summary (Last 24 hours) at 10/18/2024 2358  Last data filed at 10/18/2024 2334  Gross per 24 hour   Intake 120 ml   Output 400 ml   Net -280 ml     CBC:   Recent Labs     10/16/24  0045 10/16/24  0546 10/17/24  0633 10/18/24  0600   WBC 8.6 8.5 4.5 4.3*   HGB 8.7* 8.8* 7.7* 8.5*   HCT 28.6* 29.0* 25.5* 26.3*    234 198 217     BMP:  Recent Labs     10/16/24  0546 10/16/24  1936 10/17/24  0633 10/17/24  0735 10/18/24  0600    136 140  --  141   K 4.6 4.7 4.6 4.31 4.3    105 108*  --  108*   CO2 24 22 24  --  24   BUN 43* 40* 36*  --  28*   CREATININE 2.8* 2.5* 2.3*  --  1.7*   LABGLOM 22* 26* 27*  --  39*     ABGs:   Lab Results   Component Value Date/Time    PH 7.369 10/17/2024 07:35 AM    PO2 127.5 10/17/2024 07:35 AM    PCO2 34.8 10/17/2024 07:35 AM     INR: No results for input(s): \"INR\" in the last 72 hours.  PRO-BNP:   Lab Results   Component Value Date    PROBNP 3,879 (H) 08/07/2024      TSH:   Lab Results   Component Value Date    TSH 2.35 10/16/2024      Cardiac Injury Profile:   Recent Labs     10/16/24  0045 10/16/24  0208 10/16/24  0810   TROPHS 281* 267* 261*      Lipid Profile:   Lab Results   Component Value Date/Time    TRIG 134 10/17/2024 06:33 AM     HDL 28 10/17/2024 06:33 AM    CHOL 94 10/17/2024 06:33 AM      Hemoglobin A1C: No components found for: \"HGBA1C\"      RAD:   Vascular duplex carotid bilateral    Result Date: 10/18/2024  The right internal carotid artery demonstrates 0-50% stenosis. The left internal carotid artery demonstrates 70-99% stenosis. Bilateral vertebral arteries are patent with flow in the normal direction. RECOMMENDATIONS: ICA       Plaque       ICA/CCA     Degree of PSV      Estimate     PSV Ratio    stenosis <180    No plaque      <2.0          Normal <180  <50% plaque    <2.0           <50% 180-230  >50% plaque  2.0 - 4.0    50-69% >230    >50% plaque      >4.0         >70% ** -180 cm/sec and ICA/CCA PSV Ratio = 2.0 is also consistent with 50-69% stenosis.     MRI BRAIN WO CONTRAST    Result Date: 10/17/2024  1. Subcentimeter acute to subacute stroke involving medial margin of right basal ganglia. 2. Chronic lacunar stroke is seen involving lateral margin of left thalamus. 3. Fluid is seen in bilateral mastoid air cells which may indicate mastoiditis.         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    Mixed hyperlipidemia E78.2    H/O asbestosis Z87.09    Lumbar and sacral osteoarthritis M47.817    History of paroxysmal supraventricular tachycardia Z86.79    Need for diphtheria-tetanus-pertussis (Tdap) vaccine Z23    Vertigo R42    Pulmonary nodules R91.8    Abnormal x-ray of pelvis R93.7    Unsteady gait when walking R26.81    Abnormal radionuclide bone scan R94.8    Hip pain, chronic, right M25.551, G89.29    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, G89.29    History of CVA (cerebrovascular accident) Z86.73    Muscle weakness (generalized) M62.81    Lumbar radiculopathy M54.16    Lumbar pain M54.50    High prostate specific antigen (PSA)

## 2024-10-19 NOTE — PROGRESS NOTES
[facility]  Neuro Inpatient Follow Up      Aubrey Up is a 83 y.o. right handed male     Neuro is following for confusion    Sig PMH: History of CVA, diabetes, neuropathy, prostate CA, ANIBAL, acute respiratory failure, asbestosis    HPI:  Patient was transferred here from [place] on 10/16 for confusion and auditory and visual hallucinations.  He has been confused since last Saturday after falling and hitting his head.  Patient did not lose consciousness but confusion has been waxing and waning since then.  Patient had a CT head completed at [place] which was negative for acute pathology.  Patient was transferred here for cardiology evaluation for non-STEMI    Also developed acute hypoxic and hypercapnic respiratory failure, MAMI, and possible UTI since being here.    MRI of the brain showed a very small acute right basal ganglia stroke.  Ultrasound of the carotids showed high-grade left carotid stenosis of 70 to 99%, but no high-grade on the right.    Patient's son tells me that in July patient had a long hospitalization for hypercapnia and extended stay in LTAC and then rehab and had only been home for 10 days prior to his admission here.    Aspirin and Plavix are on his med list from home, and his son fixes his medications and states he thinks he has been taking Plavix but does not think he has been taking aspirin.  There is some confusion about his medications amidst his recent hospital stays    Subjective:  He is lying in bed alert, and oriented but son states he is still having some mild visual hallucinations and general fatigue.  No weakness or numbness.  No previous history of cognitive problems    No chest pain or palpitations  No SOB  No vertigo, lightheadedness or loss of consciousness  No falls, tripping or stumbling  No incontinence of bowels or bladder  No itching or bruising appreciated  No numbness, tingling or focal arm/leg weakness  No speech or swallowing

## 2024-10-19 NOTE — PROGRESS NOTES
HOSPITALIST PROGRESS NOTE    Patient's name: Aubrey Up  : 1940  Admission date: 10/16/2024  Date of service: 10/19/2024   Primary care physician: Dionne Singh DO    Assessment   Patient admitted on 10/16/2024     Aubrey Up is a 83 y.o. male patient of Dionne Singh DO with history of CVA, gastroparesis, TIA, sleep apnea on CPAP, history of asbestosis, dysphagia s/p PEG tube placement on tube feeds presented to [facility] on 10/16/2024 from [facility] where he had initially presented with concern of confusion, auditory and visual hallucination.  Patient had a recent prolonged hospitalization for pneumonia and renal failure and was discharged home after rehab in early October.  While at outlying facility patient found to have elevated troponin and patient was transferred to [facility] main campus for NSTEMI and cardiology evaluation.    Possible NSTEMI  Eval by cardiology, troponins have been flat and are downtrending  Heparin has been turned off  Continue aspirin, Plavix, statin  Was evaluated by cardiology, cleared for discharge.    Acute/subacute right basal ganglia stroke  MRI brain reviewed showing subcentimeter acute to subacute stroke in the right basal ganglia  Patient has no obvious focal deficits, speech is clear  Continue aspirin Plavix and statin  PT/OT.    MAMI  Patient's baseline creatinine seems to be around 2, creatinine elevated 2.8 on admission  Nephrology was consulted  Renal function is improving  Currently on IV fluids    Possible UTI  Treated with IV ceftriaxone while in the hospital.    Obstructive sleep apnea  Restrictive lung disease  Asbestosis  Acute on chronic hypoxic and hypercapnic respiratory failure  Patient would benefit from AVAPS/NIV use at at bedtime and as needed  This patient would benefit from non-invasive therapy, utilizing AVAPS AE to provide a targeted tidal volume. It is therefore required that due  tablet 40 mEq  40 mEq Oral PRN Jag Dodd APRN - CNP        Or    potassium bicarb-citric acid (EFFER-K) effervescent tablet 40 mEq  40 mEq Oral PRN Jag Dodd APRN - CNP        Or    potassium chloride 10 mEq/100 mL IVPB (Peripheral Line)  10 mEq IntraVENous PRN Jag Dodd APRN - CNP        magnesium sulfate 2000 mg in 50 mL IVPB premix  2,000 mg IntraVENous PRN Jag Dodd APRN - CNP        ondansetron (ZOFRAN-ODT) disintegrating tablet 4 mg  4 mg Oral Q8H PRN Jag Dodd APRN - CNP        Or    ondansetron (ZOFRAN) injection 4 mg  4 mg IntraVENous Q6H PRN Jag Dodd APRN - CNP        acetaminophen (TYLENOL) tablet 650 mg  650 mg Oral Q6H PRN Jag Dodd APRN - CNP        Or    acetaminophen (TYLENOL) suppository 650 mg  650 mg Rectal Q6H PRN Jag Dodd APRN - CNP        polyethylene glycol (GLYCOLAX) packet 17 g  17 g Oral Daily PRN Jag Dodd APRN - CNP        aluminum & magnesium hydroxide-simethicone (MAALOX) 200-200-20 MG/5ML suspension 30 mL  30 mL Oral Q6H PRN Jag Dodd APRN - CNP        insulin lispro (HUMALOG,ADMELOG) injection vial 0-8 Units  0-8 Units SubCUTAneous 4x Daily AC & HS Jag Dodd APRN - CNP        insulin glargine (LANTUS) injection vial 14 Units  14 Units SubCUTAneous Nightly Jag Dodd APRN - CNP   14 Units at 10/18/24 2053    sodium chloride 0.9 % bolus 1,000 mL  1,000 mL IntraVENous Once Tamiko Michel MD        cefTRIAXone (ROCEPHIN) 1,000 mg in sterile water 10 mL IV syringe  1,000 mg IntraVENous Q24H Tamiko Michel MD   1,000 mg at 10/18/24 1446       PRN Medications  white petrolatum, acetaminophen, ipratropium 0.5 mg-albuterol 2.5 mg, glucose, dextrose bolus **OR** dextrose bolus, glucagon (rDNA), dextrose, sodium chloride flush, sodium chloride, potassium chloride **OR** potassium alternative oral replacement **OR** potassium chloride, magnesium sulfate, ondansetron **OR** ondansetron,  acetaminophen **OR** acetaminophen, polyethylene glycol, aluminum & magnesium hydroxide-simethicone    +++++++++++++++++++++++++++++++++++++++++++++++++  Edison Bloom MD    [facility] - [place], [place]  +++++++++++++++++++++++++++++++++++++++++++++++++  NOTE: This report was transcribed using voice recognition software. Every effort was made to ensure accuracy; however, inadvertent computerized transcription errors may be present.    I can be reached through [employer].

## 2024-10-19 NOTE — PROGRESS NOTES
Messaged Dr Bloom to notify of family request for Gen surg consult to Dr Stokes for PEG removal    Electronically signed by Rita Hernandez RN on 10/19/2024 at 4:29 PM

## 2024-10-19 NOTE — PROGRESS NOTES
Department of Internal Medicine  Nephrology Progress Note      Events reviewed.     SUBJECTIVE: We are following Mr. Aubrey Up for MAMI on CKD. Patient reports no new complaints today.     PHYSICAL EXAM:      Vitals:    VITALS:  /60   Pulse 65   Temp 98 °F (36.7 °C) (Temporal)   Resp 17   Ht 1.727 m (5' 8\")   Wt 81.6 kg (180 lb)   SpO2 99%   BMI 27.37 kg/m²   24HR BLOOD PRESSURE RANGE:  Systolic (24hrs), Av , Min:122 , Max:184   ; Diastolic (24hrs), Av, Min:60, Max:80    24HR INTAKE/OUTPUT:    Intake/Output Summary (Last 24 hours) at 10/19/2024 1432  Last data filed at 10/19/2024 1245  Gross per 24 hour   Intake 3320 ml   Output 1050 ml   Net 2270 ml       Constitutional:  lethargic, confused, on BiPAP  HEENT:  PERRLA   Respiratory:  diminished   Cardiovascular/Edema:  RRR  Gastrointestinal:    Neurologic:  baseline   Skin:  warm, dry  Other:  no edema    Scheduled Meds:   atorvastatin  40 mg Per NG tube Daily    heparin (porcine)  5,000 Units SubCUTAneous Q8H    aspirin  81 mg Oral BID    brimonidine  1 drop Right Eye BID    carvedilol  12.5 mg Per NG tube BID WC    clopidogrel  75 mg Oral Daily    pantoprazole  40 mg Oral Daily    sodium chloride flush  5-40 mL IntraVENous 2 times per day    insulin lispro  0-8 Units SubCUTAneous 4x Daily AC & HS    insulin glargine  14 Units SubCUTAneous Nightly    sodium chloride  1,000 mL IntraVENous Once    cefTRIAXone (ROCEPHIN) IV  1,000 mg IntraVENous Q24H     Continuous Infusions:   dextrose      sodium chloride       PRN Meds:.white petrolatum, acetaminophen, ipratropium 0.5 mg-albuterol 2.5 mg, glucose, dextrose bolus **OR** dextrose bolus, glucagon (rDNA), dextrose, sodium chloride flush, sodium chloride, potassium chloride **OR** potassium alternative oral replacement **OR** potassium chloride, magnesium sulfate, ondansetron **OR** ondansetron, acetaminophen **OR** acetaminophen, polyethylene glycol, aluminum & magnesium  Debo from [facility] where he initially presented with altered mental status, CT scan of the brain showed only age related changes, his EKG showed ST elevation with elevated troponin level at 322, repeat value was >500 consistent with NSTEMI, reason for his transferring to this center for further evaluation.    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 Renal function improving with IVF, creatinine 1.7 mg/dL yesterday, awaiting AM labs today     CKD stage IIIb-IV, 2/2 ischemic ATN in July 2024, creatinine plateaued  at 2.1-2.3 mg/dL  HTN, carvedilol   Normocytic anemia, obtain iron studies, B12 and folate  ------------------------------------------------  CAD, NSTEMI, heparin gtt, clopidogrel, carvedilol, ASA  Acute hypoxic and hypercapnic respiratory failure  Type II DM, SSI  BPH, on tamsulosin  HLD, on atorvastatin   ANIBAL on BiPAP  History of asbestosis   History of pulmonary nodules   History of prostate cancer   Nutrition, S/P PEG tube, NPO      Plan:    Await AM labs   Hold lisinopril   Continue carvedilol 12.5 mg BID  Continue to monitor renal function   Continue monitor blood pressure       NAZARIO Mays - CNP      I saw and evaluated the patient, performing the key elements of the service. I discussed the findings, assessment and plan with NP and agree with her findings and plans as documented in her note.        Jaspal Franklin MD

## 2024-10-19 NOTE — PROGRESS NOTES
Gen Surg consult called to answering service for Dr Stokes    Electronically signed by Rita Hernandez RN on 10/19/2024 at 4:33 PM

## 2024-10-20 LAB
ANION GAP SERPL CALCULATED.3IONS-SCNC: 9 MMOL/L (ref 7–16)
BUN SERPL-MCNC: 18 MG/DL (ref 6–23)
CALCIUM SERPL-MCNC: 9.2 MG/DL (ref 8.6–10.2)
CHLORIDE SERPL-SCNC: 106 MMOL/L (ref 98–107)
CO2 SERPL-SCNC: 26 MMOL/L (ref 22–29)
CREAT SERPL-MCNC: 1.5 MG/DL (ref 0.7–1.2)
ERYTHROCYTE [DISTWIDTH] IN BLOOD BY AUTOMATED COUNT: 15.9 % (ref 11.5–15)
GFR, ESTIMATED: 46 ML/MIN/1.73M2
GLUCOSE BLD-MCNC: 127 MG/DL (ref 74–99)
GLUCOSE BLD-MCNC: 135 MG/DL (ref 74–99)
GLUCOSE BLD-MCNC: 153 MG/DL (ref 74–99)
GLUCOSE BLD-MCNC: 84 MG/DL (ref 74–99)
GLUCOSE SERPL-MCNC: 81 MG/DL (ref 74–99)
HCT VFR BLD AUTO: 27.2 % (ref 37–54)
HGB BLD-MCNC: 8.4 G/DL (ref 12.5–16.5)
MCH RBC QN AUTO: 28.7 PG (ref 26–35)
MCHC RBC AUTO-ENTMCNC: 30.9 G/DL (ref 32–34.5)
MCV RBC AUTO: 92.8 FL (ref 80–99.9)
PLATELET # BLD AUTO: 204 K/UL (ref 130–450)
PMV BLD AUTO: 10.7 FL (ref 7–12)
POTASSIUM SERPL-SCNC: 3.7 MMOL/L (ref 3.5–5)
RBC # BLD AUTO: 2.93 M/UL (ref 3.8–5.8)
SODIUM SERPL-SCNC: 141 MMOL/L (ref 132–146)
WBC OTHER # BLD: 4.7 K/UL (ref 4.5–11.5)

## 2024-10-20 PROCEDURE — 2060000000 HC ICU INTERMEDIATE R&B

## 2024-10-20 PROCEDURE — 6370000000 HC RX 637 (ALT 250 FOR IP)

## 2024-10-20 PROCEDURE — 6360000002 HC RX W HCPCS: Performed by: STUDENT IN AN ORGANIZED HEALTH CARE EDUCATION/TRAINING PROGRAM

## 2024-10-20 PROCEDURE — 2580000003 HC RX 258

## 2024-10-20 PROCEDURE — 2700000000 HC OXYGEN THERAPY PER DAY

## 2024-10-20 PROCEDURE — 36415 COLL VENOUS BLD VENIPUNCTURE: CPT

## 2024-10-20 PROCEDURE — 2580000003 HC RX 258: Performed by: STUDENT IN AN ORGANIZED HEALTH CARE EDUCATION/TRAINING PROGRAM

## 2024-10-20 PROCEDURE — 85027 COMPLETE CBC AUTOMATED: CPT

## 2024-10-20 PROCEDURE — 2580000003 HC RX 258: Performed by: INTERNAL MEDICINE

## 2024-10-20 PROCEDURE — 94660 CPAP INITIATION&MGMT: CPT

## 2024-10-20 PROCEDURE — 6370000000 HC RX 637 (ALT 250 FOR IP): Performed by: STUDENT IN AN ORGANIZED HEALTH CARE EDUCATION/TRAINING PROGRAM

## 2024-10-20 PROCEDURE — 6360000002 HC RX W HCPCS: Performed by: INTERNAL MEDICINE

## 2024-10-20 PROCEDURE — 82962 GLUCOSE BLOOD TEST: CPT

## 2024-10-20 PROCEDURE — 80048 BASIC METABOLIC PNL TOTAL CA: CPT

## 2024-10-20 RX ADMIN — INSULIN GLARGINE 14 UNITS: 100 INJECTION, SOLUTION SUBCUTANEOUS at 20:52

## 2024-10-20 RX ADMIN — PANTOPRAZOLE SODIUM 40 MG: 40 TABLET, DELAYED RELEASE ORAL at 08:53

## 2024-10-20 RX ADMIN — CEFTRIAXONE SODIUM 1000 MG: 1 INJECTION, POWDER, FOR SOLUTION INTRAMUSCULAR; INTRAVENOUS at 14:20

## 2024-10-20 RX ADMIN — SODIUM CHLORIDE, PRESERVATIVE FREE 10 ML: 5 INJECTION INTRAVENOUS at 20:59

## 2024-10-20 RX ADMIN — HEPARIN SODIUM 5000 UNITS: 5000 INJECTION INTRAVENOUS; SUBCUTANEOUS at 05:36

## 2024-10-20 RX ADMIN — ASPIRIN 81 MG: 81 TABLET, COATED ORAL at 08:53

## 2024-10-20 RX ADMIN — SODIUM CHLORIDE 125 MG: 9 INJECTION, SOLUTION INTRAVENOUS at 23:42

## 2024-10-20 RX ADMIN — CARVEDILOL 12.5 MG: 6.25 TABLET, FILM COATED ORAL at 08:53

## 2024-10-20 RX ADMIN — ATORVASTATIN CALCIUM 40 MG: 40 TABLET, FILM COATED ORAL at 08:53

## 2024-10-20 RX ADMIN — CLOPIDOGREL BISULFATE 75 MG: 75 TABLET ORAL at 08:53

## 2024-10-20 RX ADMIN — CARVEDILOL 12.5 MG: 6.25 TABLET, FILM COATED ORAL at 17:15

## 2024-10-20 RX ADMIN — BRIMONIDINE TARTRATE 1 DROP: 2 SOLUTION/ DROPS OPHTHALMIC at 08:54

## 2024-10-20 RX ADMIN — BRIMONIDINE TARTRATE 1 DROP: 2 SOLUTION/ DROPS OPHTHALMIC at 20:59

## 2024-10-20 RX ADMIN — HEPARIN SODIUM 5000 UNITS: 5000 INJECTION INTRAVENOUS; SUBCUTANEOUS at 20:52

## 2024-10-20 RX ADMIN — ASPIRIN 81 MG: 81 TABLET, COATED ORAL at 20:53

## 2024-10-20 RX ADMIN — SODIUM CHLORIDE, PRESERVATIVE FREE 10 ML: 5 INJECTION INTRAVENOUS at 08:53

## 2024-10-20 RX ADMIN — SODIUM CHLORIDE, PRESERVATIVE FREE 10 ML: 5 INJECTION INTRAVENOUS at 14:21

## 2024-10-20 RX ADMIN — HEPARIN SODIUM 5000 UNITS: 5000 INJECTION INTRAVENOUS; SUBCUTANEOUS at 14:20

## 2024-10-20 ASSESSMENT — PAIN SCALES - GENERAL
PAINLEVEL_OUTOF10: 0

## 2024-10-20 NOTE — CONSULTS
General Surgery   Consult Note      Patient's Name/Date of Birth: Aubrey Up / 1940    Date: October 19, 2024     PCP: Dionne Singh DO     Chief Complaint: Confusion, auditory and visual hallucinations.    HPI:   Aubrey Up is a 83 y.o. male past medical history of CVA, prostate cancer, CKD, asbestos exposure with restrictive lung disease who presented from [facility] on 10/16 for confusion and auditory/visual hallucinations.  Per chart review patient has been confused since the Saturday prior after falling and hitting his head.  He was transferred to [facility] for elevated troponins and for evaluation of NSTEMI and was found to have an acute to subacute stroke basal ganglia with another chronic lacunar stroke. General surgery was consulted for PEG tube removal.  In July of this year patient had a prolonged hospitalization stay in which she was intubated and subsequently extubated and had aspiration pneumonia.  He failed a swallow eval at that time and had a PEG tube placed on 8/6/2024 by Dr. Stokes.  Per family today patient has not used his PEG tube for over 5 weeks now.  He has been tolerating liquids and solids without any issues.  Denies abdominal pain, nausea, vomiting.  No change in bowel function.    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

## 2024-10-20 NOTE — PROGRESS NOTES
GENERAL SURGERY  DAILY PROGRESS NOTE  10/20/2024    Subjective:  No new complaints or overnight events. PEG removed bedside yesterday.    Objective:  BP (!) 165/73   Pulse 62   Temp 97.1 °F (36.2 °C) (Temporal)   Resp 20   Ht 1.727 m (5' 8\")   Wt 81.6 kg (180 lb)   SpO2 100%   BMI 27.37 kg/m²   I/O last 3 completed shifts:  In: 3740 [P.O.:740; I.V.:3000]  Out: 1400 [Urine:1400]    General Appearance: No acute distress  Skin:  No cyanosis  Head/face:  NCAT  Eyes:  No gross abnormalities. Sclera nonicteric  Lungs/Chest:  Normal expansion. No respiratory distress. On 2L  Heart: Warm throughout. Regular rate   Abdomen:  Soft, no tenderness, non distended. PEG site clean with minimal erythema and serous fluid    Extremities: No gross deformities or edema.    Labs:  CBC  Recent Labs     10/18/24  0600   WBC 4.3*   HGB 8.5*   HCT 26.3*        BMP  Recent Labs     10/18/24  0600      K 4.3   *   CO2 24   BUN 28*   CREATININE 1.7*   CALCIUM 8.9     Liver Function  No results for input(s): \"AMYLASE\", \"LIPASE\", \"BILITOT\", \"BILIDIR\", \"AST\", \"ALT\", \"ALKPHOS\", \"LABALBU\" in the last 72 hours.    Invalid input(s): \"PROT\"  No results for input(s): \"LACTATE\" in the last 72 hours.  No results for input(s): \"INR\" in the last 72 hours.    Invalid input(s): \"PT\", \"PTT\"    Radiology:  I have personally reviewed all relevant imaging:        Assessment/Plan:  83 y.o. male who was admitted for CVA with history of PEG placed 8/6/24    Pt tolerating PO intake for several weeks  PEG removed bedside yesterday at family's request   No issues at PEG site   Please call or page with any questions or concerns     Plan discussed with Dr. Whittaker    Electronically signed by Aditya Bates DO on 10/20/2024 at 6:16 AM    Attending Note:        Patient seen/examined 10/20/2024. I performed the key aspects of the physical exam.  I have independently reviewed the record including all pertinent and new radiology images and

## 2024-10-20 NOTE — PROGRESS NOTES
Jah Palomo M.D.  Dandre Ceja D.O.  Trent Live M.D.  Dawn De La Torre M.D.   Alex Snowden D.O.  Enrique Lucas M.D.           Daily Pulmonary Progress Note    Patient:  Aubrey Up 83 y.o. male MRN: [id number]     Date of Service: 10/20/2024        Subjective      Patient was seen and examined.    Overall been doing better, however still has some intermittent confusion.  Wearing NIV at night.    Objective   Vitals: BP (!) 149/65   Pulse 60   Temp 98.7 °F (37.1 °C) (Temporal)   Resp 18   Ht 1.727 m (5' 8\")   Wt 81.6 kg (180 lb)   SpO2 96%   BMI 27.37 kg/m²     I/O:    Intake/Output Summary (Last 24 hours) at 10/20/2024 1401  Last data filed at 10/20/2024 1155  Gross per 24 hour   Intake 480 ml   Output 350 ml   Net 130 ml       CURRENT MEDS :  Scheduled Meds:   atorvastatin  40 mg Per NG tube Daily    heparin (porcine)  5,000 Units SubCUTAneous Q8H    aspirin  81 mg Oral BID    brimonidine  1 drop Right Eye BID    carvedilol  12.5 mg Per NG tube BID WC    clopidogrel  75 mg Oral Daily    pantoprazole  40 mg Oral Daily    sodium chloride flush  5-40 mL IntraVENous 2 times per day    insulin lispro  0-8 Units SubCUTAneous 4x Daily AC & HS    insulin glargine  14 Units SubCUTAneous Nightly    sodium chloride  1,000 mL IntraVENous Once    cefTRIAXone (ROCEPHIN) IV  1,000 mg IntraVENous Q24H       Continuous Infusions:   dextrose      sodium chloride         PRN Meds:  white petrolatum, acetaminophen, ipratropium 0.5 mg-albuterol 2.5 mg, glucose, dextrose bolus **OR** dextrose bolus, glucagon (rDNA), dextrose, sodium chloride flush, sodium chloride, potassium chloride **OR** potassium alternative oral replacement **OR** potassium chloride, magnesium sulfate, ondansetron **OR** ondansetron, acetaminophen **OR** acetaminophen, polyethylene glycol, aluminum & magnesium hydroxide-simethicone      Physical Exam:  Physical Exam  Constitutional:       General: He is not in acute distress.  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 His condition has worsened and his quality of life has deteriorated. A non invasive home ventilator is required at this time as the patient has a significant risk of dying from acute on top of chronic hypercapnic respiratory failure if he doesn't get to the hospital in time. Patient has used Bipap in the past but it has been ineffective in managing his hypercapnic respiratory failure. It is my professional opinion that the patient needs treated with an augmented tidal volume to resolve the CO2 retention that is not available with a Bipap or Bipap ST. This patient would benefit from non-invasive therapy, utilizing AVAPS AE to provide a targeted tidal volume. It is therefore required that due to the severity of his lung disease a non invasive home ventilator is medically necessary to aid in the management of his severe condition. Without this advanced therapy this patient is at high risk for a life threatening exacerbation of hypercapnic respiratory failure requiring frequent hospital readmissions.     Thank you for allowing me to participate in the care of Aubrey Up.   Please feel free to call with questions.     Electronically signed by Alex Snowden DO on 10/20/2024 at 2:01 PM

## 2024-10-20 NOTE — PROGRESS NOTES
HOSPITALIST PROGRESS NOTE    Patient's name: Aubrey Up  : 1940  Admission date: 10/16/2024  Date of service: 10/20/2024   Primary care physician: Dionne Singh DO    Assessment   Patient admitted on 10/16/2024     Aubrey Up is a 83 y.o. male patient of Dionne Singh DO with history of CVA, gastroparesis, TIA, sleep apnea on CPAP, history of asbestosis, dysphagia s/p PEG tube placement on tube feeds presented to [facility] on 10/16/2024 from [facility] where he had initially presented with concern of confusion, auditory and visual hallucination.  Patient had a recent prolonged hospitalization for pneumonia and renal failure and was discharged home after rehab in early October.  While at outlying facility patient found to have elevated troponin and patient was transferred to [facility] main campus for NSTEMI and cardiology evaluation.    Possible NSTEMI  Eval by cardiology, troponins have been flat and are downtrending  Heparin has been turned off  Continue aspirin, Plavix, statin  Was evaluated by cardiology, cleared for discharge.    Acute/subacute right basal ganglia stroke  MRI brain reviewed showing subcentimeter acute to subacute stroke in the right basal ganglia  Patient has no obvious focal deficits, speech is clear  Continue aspirin Plavix and statin  PT/OT.    MAMI  Patient's baseline creatinine seems to be around 2, creatinine elevated 2.8 on admission  Nephrology was consulted  Renal function is improving  Currently on IV fluids    Possible UTI  Treated with IV ceftriaxone while in the hospital.    Obstructive sleep apnea  Restrictive lung disease  Asbestosis  Acute on chronic hypoxic and hypercapnic respiratory failure  Patient would benefit from AVAPS/NIV use at at bedtime and as needed  This patient would benefit from non-invasive therapy, utilizing AVAPS AE to provide a targeted tidal volume. It is therefore required that due  obvious focal deficits  Psychiatric: Thought content appropriate, normal insight    Hospital Medications  Current Facility-Administered Medications   Medication Dose Route Frequency Provider Last Rate Last Admin    atorvastatin (LIPITOR) tablet 40 mg  40 mg Per NG tube Daily Edison Bloom MD   40 mg at 10/20/24 0853    heparin (porcine) injection 5,000 Units  5,000 Units SubCUTAneous Q8H Edison Bloom MD   5,000 Units at 10/20/24 0536    white petrolatum ointment   Topical BID PRN Edison Bloom MD        acetaminophen (TYLENOL) tablet 650 mg  650 mg Per NG tube Q6H PRN Jag Dodd APRN - CNP        aspirin EC tablet 81 mg  81 mg Oral BID Jag Dodd APRN - CNP   81 mg at 10/20/24 0853    brimonidine (ALPHAGAN) 0.2 % ophthalmic solution 1 drop  1 drop Right Eye BID Jag Dodd APRN - CNP   1 drop at 10/20/24 0854    carvedilol (COREG) tablet 12.5 mg  12.5 mg Per NG tube BID  Tamiko Michel MD   12.5 mg at 10/20/24 0853    clopidogrel (PLAVIX) tablet 75 mg  75 mg Oral Daily Jag Dodd APRN - CNP   75 mg at 10/20/24 0853    ipratropium 0.5 mg-albuterol 2.5 mg (DUONEB) nebulizer solution 1 Dose  1 Dose Inhalation Q4H PRN Jag Dodd, APRN - CNP        pantoprazole (PROTONIX) tablet 40 mg  40 mg Oral Daily Jag Dodd APRN - CNP   40 mg at 10/20/24 0853    glucose chewable tablet 16 g  4 tablet Oral PRN Jag Dodd, APRN - CNP        dextrose bolus 10% 125 mL  125 mL IntraVENous PRN Jag Dodd APRN - CNP        Or    dextrose bolus 10% 250 mL  250 mL IntraVENous PRN Jag Dodd, APRN - CNP        glucagon injection 1 mg  1 mg SubCUTAneous PRN Jag Dodd, APRN - CNP        dextrose 10 % infusion   IntraVENous Continuous PRN Jag Dodd APRN - CNP        sodium chloride flush 0.9 % injection 5-40 mL  5-40 mL IntraVENous 2 times per day Jag Dodd APRN - CNP   10 mL at 10/20/24 0853    sodium chloride flush 0.9 % injection 5-40 mL   Provider Last Rate Last Admin    atorvastatin (LIPITOR) tablet 40 mg  40 mg Per NG tube Daily Edison Bloom MD   40 mg at 10/20/24 0853    heparin (porcine) injection 5,000 Units  5,000 Units SubCUTAneous Q8H Edison Bloom MD   5,000 Units at 10/20/24 0536    white petrolatum ointment   Topical BID PRN Edison Bloom MD        acetaminophen (TYLENOL) tablet 650 mg  650 mg Per NG tube Q6H PRN Jag Dodd APRN - CNP        aspirin EC tablet 81 mg  81 mg Oral BID Jag Dodd APRN - CNP   81 mg at 10/20/24 0853    brimonidine (ALPHAGAN) 0.2 % ophthalmic solution 1 drop  1 drop Right Eye BID Jag Dodd APRN - CNP   1 drop at 10/20/24 0854    carvedilol (COREG) tablet 12.5 mg  12.5 mg Per NG tube BID  Tamiko Michel MD   12.5 mg at 10/20/24 0853    clopidogrel (PLAVIX) tablet 75 mg  75 mg Oral Daily Jag Dodd APRN - CNP   75 mg at 10/20/24 0853    ipratropium 0.5 mg-albuterol 2.5 mg (DUONEB) nebulizer solution 1 Dose  1 Dose Inhalation Q4H PRN Jag Dodd APRN - SARAH        pantoprazole (PROTONIX) tablet 40 mg  40 mg Oral Daily Jag Dodd APRN - CNP   40 mg at 10/20/24 0853    glucose chewable tablet 16 g  4 tablet Oral PRN Jag Dodd APRN - CNP        dextrose bolus 10% 125 mL  125 mL IntraVENous PRN Jag Dodd APRN - CNP        Or    dextrose bolus 10% 250 mL  250 mL IntraVENous PRN Jag Dodd, APRN - CNP        glucagon injection 1 mg  1 mg SubCUTAneous PRN Jag Dodd APRN - CNP        dextrose 10 % infusion   IntraVENous Continuous PRN Jag Dodd APRN - CNP        sodium chloride flush 0.9 % injection 5-40 mL  5-40 mL IntraVENous 2 times per day Jag Dodd APRN - CNP   10 mL at 10/20/24 0853    sodium chloride flush 0.9 % injection 5-40 mL  5-40 mL IntraVENous PRN Jag Dodd, NAZARIO - CNP        0.9 % sodium chloride infusion   IntraVENous PRN Jag Dodd, NAZARIO - CNP        potassium chloride (KLOR-CON M)  extended release tablet 40 mEq  40 mEq Oral PRN Jag Dodd APRN - CNP        Or    potassium bicarb-citric acid (EFFER-K) effervescent tablet 40 mEq  40 mEq Oral PRN Jag Dodd APRN - CNP        Or    potassium chloride 10 mEq/100 mL IVPB (Peripheral Line)  10 mEq IntraVENous PRN Jag Dodd APRN - CNP        magnesium sulfate 2000 mg in 50 mL IVPB premix  2,000 mg IntraVENous PRN Jag Dodd APRN - CNP        ondansetron (ZOFRAN-ODT) disintegrating tablet 4 mg  4 mg Oral Q8H PRN Jag Dodd APRN - CNP        Or    ondansetron (ZOFRAN) injection 4 mg  4 mg IntraVENous Q6H PRN Jag Dodd APRN - CNP        acetaminophen (TYLENOL) tablet 650 mg  650 mg Oral Q6H PRN Jag Dodd APRN - CNP   650 mg at 10/19/24 1706    Or    acetaminophen (TYLENOL) suppository 650 mg  650 mg Rectal Q6H PRN Jag Dodd APRN - CNP        polyethylene glycol (GLYCOLAX) packet 17 g  17 g Oral Daily PRN Jag Dodd APRN - CNP        aluminum & magnesium hydroxide-simethicone (MAALOX) 200-200-20 MG/5ML suspension 30 mL  30 mL Oral Q6H PRN Jag Dodd APRN - CNP        insulin lispro (HUMALOG,ADMELOG) injection vial 0-8 Units  0-8 Units SubCUTAneous 4x Daily AC & HS Jag Dodd APRN - CNP        insulin glargine (LANTUS) injection vial 14 Units  14 Units SubCUTAneous Nightly Jag Dodd APRN - CNP   14 Units at 10/19/24 2101    sodium chloride 0.9 % bolus 1,000 mL  1,000 mL IntraVENous Once Tamiko Michel MD        cefTRIAXone (ROCEPHIN) 1,000 mg in sterile water 10 mL IV syringe  1,000 mg IntraVENous Q24H Tamiko Michel MD   1,000 mg at 10/19/24 1355       PRN Medications  white petrolatum, acetaminophen, ipratropium 0.5 mg-albuterol 2.5 mg, glucose, dextrose bolus **OR** dextrose bolus, glucagon (rDNA), dextrose, sodium chloride flush, sodium chloride, potassium chloride **OR** potassium alternative oral replacement **OR** potassium chloride, magnesium

## 2024-10-20 NOTE — PLAN OF CARE
Problem: Chronic Conditions and Co-morbidities  Goal: Patient's chronic conditions and co-morbidity symptoms are monitored and maintained or improved  10/20/2024 1402 by Rita Jalloh RN  Outcome: Progressing  10/20/2024 0102 by Madonna Isaac RN  Outcome: Progressing     Problem: Discharge Planning  Goal: Discharge to home or other facility with appropriate resources  10/20/2024 1402 by Rita Jalloh RN  Outcome: Progressing  10/20/2024 0102 by Madonna Isaac RN  Outcome: Progressing     Problem: Skin/Tissue Integrity  Goal: Absence of new skin breakdown  Description: 1.  Monitor for areas of redness and/or skin breakdown  2.  Assess vascular access sites hourly  3.  Every 4-6 hours minimum:  Change oxygen saturation probe site  4.  Every 4-6 hours:  If on nasal continuous positive airway pressure, respiratory therapy assess nares and determine need for appliance change or resting period.  10/20/2024 1402 by Rita Jalloh RN  Outcome: Progressing  10/20/2024 0102 by Madonna Isaac RN  Outcome: Progressing     Problem: ABCDS Injury Assessment  Goal: Absence of physical injury  10/20/2024 1402 by Rita Jalloh RN  Outcome: Progressing  10/20/2024 0102 by Madonna Isaac RN  Outcome: Progressing     Problem: Safety - Adult  Goal: Free from fall injury  10/20/2024 1402 by Rita Jalloh RN  Outcome: Progressing  10/20/2024 0102 by Madonna Isaac RN  Outcome: Progressing     Problem: Pain  Goal: Verbalizes/displays adequate comfort level or baseline comfort level  Outcome: Progressing

## 2024-10-20 NOTE — PROGRESS NOTES
Date: 10/19/2024    Time: 9:56 PM    Patient Placed On BIPAP/CPAP/ Non-Invasive Ventilation?  Yes    If no must comment.  Facial area red/color change? No           If YES are Blister/Lesion present?No   If yes must notify nursing staff  BIPAP/CPAP skin barrier?  Yes    Skin barrier type:mepilexlite       Comments:        Patricia Simerlink, RCP

## 2024-10-20 NOTE — PROGRESS NOTES
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    Mixed hyperlipidemia E78.2    H/O asbestosis Z87.09    Lumbar and sacral osteoarthritis M47.817    History of paroxysmal supraventricular tachycardia Z86.79    Need for diphtheria-tetanus-pertussis (Tdap) vaccine Z23    Vertigo R42    Pulmonary nodules R91.8    Abnormal x-ray of pelvis R93.7    Unsteady gait when walking R26.81    Abnormal radionuclide bone scan R94.8    Hip pain, chronic, right M25.551, G89.29    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, G89.29    History of CVA (cerebrovascular accident) Z86.73    Muscle weakness (generalized) M62.81    Lumbar radiculopathy M54.16    Lumbar pain M54.50    High prostate specific antigen (PSA) R97.20    Neuropathy G62.9    Sprain of medial collateral ligament of right knee S83.411A    Acute pain of right knee M25.561    Disorder of liver K76.9    History of prostate cancer Z85.46    NSTEMI (non-ST elevated myocardial infarction) ([facility]) I21.4    Acute coronary syndrome ([facility]) I24.9    Acute confusional state F05    Carotid stenosis, asymptomatic, left I65.22    Hallucinations R44.3    Acute ischemic stroke ([facility]) I63.9       SUBJECTIVE:  Aubrey Up is fully alert and once again repeats multiple times that he belongs at home and not in the hospital.He has not ambulated much today however.He denies any lightheadedness, headache, shortness of breath, palpitations or chest discomfort.    REVIEW OF SYSTEMS:  General ROS: negative for - fatigue, malaise,  weight gain or weight loss  Psychological ROS: negative for - anxiety , depression  Ophthalmic ROS: negative for - decreased vision or visual distortion.  ENT ROS: negative  Allergy and Immunology ROS: negative  Hematological and Lymphatic ROS: negative  Endocrine: no heat or cold intolerance and no  stenosis. Bilateral vertebral arteries are patent with flow in the normal direction. RECOMMENDATIONS: ICA       Plaque       ICA/CCA     Degree of PSV      Estimate     PSV Ratio    stenosis <180    No plaque      <2.0          Normal <180  <50% plaque    <2.0           <50% 180-230  >50% plaque  2.0 - 4.0    50-69% >230    >50% plaque      >4.0         >70% ** -180 cm/sec and ICA/CCA PSV Ratio = 2.0 is also consistent with 50-69% stenosis.     MRI BRAIN WO CONTRAST    Result Date: 10/17/2024  1. Subcentimeter acute to subacute stroke involving medial margin of right basal ganglia. 2. Chronic lacunar stroke is seen involving lateral margin of left thalamus. 3. Fluid is seen in bilateral mastoid air cells which may indicate mastoiditis.         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    Mixed hyperlipidemia E78.2    H/O asbestosis Z87.09    Lumbar and sacral osteoarthritis M47.817    History of paroxysmal supraventricular tachycardia Z86.79    Need for diphtheria-tetanus-pertussis (Tdap) vaccine Z23    Vertigo R42    Pulmonary nodules R91.8    Abnormal x-ray of pelvis R93.7    Unsteady gait when walking R26.81    Abnormal radionuclide bone scan R94.8    Hip pain, chronic, right M25.551, G89.29    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, G89.29    History of CVA (cerebrovascular accident) Z86.73    Muscle weakness (generalized) M62.81    Lumbar radiculopathy M54.16    Lumbar pain M54.50    High prostate specific antigen (PSA) R97.20    Neuropathy G62.9    Sprain of medial collateral ligament of right knee S83.411A    Acute pain of right knee M25.561    Disorder of liver K76.9    History of prostate cancer Z85.46    NSTEMI (non-ST elevated myocardial infarction) (HCC) I21.4    Acute coronary syndrome (HCC) I24.9    Acute confusional  state F05    Carotid stenosis, asymptomatic, left I65.22    Hallucinations R44.3    Acute ischemic stroke (HCC) I63.9       RECOMMENDATIONS:  Unfortunately Mr. Miranda did not get up to walk with help today for unknown reasons.  He is still quite anxious to go home at this point.  Cardiac status remains relatively stable.    I have spent more than 26 minutes face to face with Aubrey Up and reviewing notes and laboratory data, with greater than 50% of this time instructing and counseling the patient and his wife face to face regarding my findings and recommendations and I have answered all questions as posed to me by Mr. Up and his family members were present at his bedside.present at his bedside.    Santy Marie, DO FACP,FACC,[facility]      NOTE:  This report was transcribed using voice recognition software.  Every effort was made to ensure accuracy; however, inadvertent computerized transcription errors may be present

## 2024-10-20 NOTE — PROGRESS NOTES
Department of Internal Medicine  Nephrology Progress Note      Events reviewed.     SUBJECTIVE: We are following Mr. Aubrey Up for MAMI on CKD. Patient reports no new complaints today.     PHYSICAL EXAM:      Vitals:    VITALS:  BP (!) 149/65   Pulse 60   Temp 98.7 °F (37.1 °C) (Temporal)   Resp 18   Ht 1.727 m (5' 8\")   Wt 81.6 kg (180 lb)   SpO2 96%   BMI 27.37 kg/m²   24HR BLOOD PRESSURE RANGE:  Systolic (24hrs), Av , Min:132 , Max:166   ; Diastolic (24hrs), Av, Min:60, Max:76    24HR INTAKE/OUTPUT:    Intake/Output Summary (Last 24 hours) at 10/20/2024 1422  Last data filed at 10/20/2024 1155  Gross per 24 hour   Intake 480 ml   Output 350 ml   Net 130 ml       Constitutional:  lethargic, confused, on BiPAP  HEENT:  PERRLA   Respiratory:  diminished   Cardiovascular/Edema:  RRR  Gastrointestinal:    Neurologic:  baseline   Skin:  warm, dry  Other:  no edema    Scheduled Meds:   atorvastatin  40 mg Per NG tube Daily    heparin (porcine)  5,000 Units SubCUTAneous Q8H    aspirin  81 mg Oral BID    brimonidine  1 drop Right Eye BID    carvedilol  12.5 mg Per NG tube BID WC    clopidogrel  75 mg Oral Daily    pantoprazole  40 mg Oral Daily    sodium chloride flush  5-40 mL IntraVENous 2 times per day    insulin lispro  0-8 Units SubCUTAneous 4x Daily AC & HS    insulin glargine  14 Units SubCUTAneous Nightly    sodium chloride  1,000 mL IntraVENous Once     Continuous Infusions:   dextrose      sodium chloride       PRN Meds:.white petrolatum, acetaminophen, ipratropium 0.5 mg-albuterol 2.5 mg, glucose, dextrose bolus **OR** dextrose bolus, glucagon (rDNA), dextrose, sodium chloride flush, sodium chloride, potassium chloride **OR** potassium alternative oral replacement **OR** potassium chloride, magnesium sulfate, ondansetron **OR** ondansetron, acetaminophen **OR** acetaminophen, polyethylene glycol, aluminum & magnesium hydroxide-simethicone        DATA:    CBC with Differential:    Lab Results    Component Value Date/Time    WBC 4.7 10/20/2024 05:43 AM    RBC 2.93 10/20/2024 05:43 AM    HGB 8.4 10/20/2024 05:43 AM    HCT 27.2 10/20/2024 05:43 AM     10/20/2024 05:43 AM    MCV 92.8 10/20/2024 05:43 AM    MCH 28.7 10/20/2024 05:43 AM    MCHC 30.9 10/20/2024 05:43 AM    RDW 15.9 10/20/2024 05:43 AM    LYMPHOPCT 18 08/19/2024 03:50 AM    MONOPCT 10 08/19/2024 03:50 AM    EOSPCT 6 08/19/2024 03:50 AM    BASOPCT 1 08/19/2024 03:50 AM    MONOSABS 0.63 08/19/2024 03:50 AM    LYMPHSABS 1.10 08/19/2024 03:50 AM    EOSABS 0.35 08/19/2024 03:50 AM    BASOSABS 0.06 08/19/2024 03:50 AM     CMP:    Lab Results   Component Value Date/Time     10/20/2024 05:43 AM    K 3.7 10/20/2024 05:43 AM     10/20/2024 05:43 AM    CO2 26 10/20/2024 05:43 AM    BUN 18 10/20/2024 05:43 AM    CREATININE 1.5 10/20/2024 05:43 AM    GFRAA >60 12/14/2021 02:47 PM    AGRATIO 1.2 02/08/2022 12:40 PM    LABGLOM 46 10/20/2024 05:43 AM    LABGLOM >60 07/19/2023 11:06 AM    GLUCOSE 81 10/20/2024 05:43 AM    GLUCOSE 99 05/20/2021 10:57 AM    CALCIUM 9.2 10/20/2024 05:43 AM    BILITOT 0.7 08/19/2024 03:50 AM    ALKPHOS 158 08/19/2024 03:50 AM    AST 27 08/19/2024 03:50 AM    ALT 22 08/19/2024 03:50 AM     Magnesium:    Lab Results   Component Value Date/Time    MG 1.7 05/17/2021 07:00 PM     Phosphorus:    Lab Results   Component Value Date/Time    PHOS 2.2 08/12/2024 03:21 AM     Radiology Review:  pending     BRIEF SUMMARY OF INITIAL CONSULT:    Briefly Mr. Aubrey Up is an 83-year-old male with a PMH of CKD stage IV, due to ischemic ATN with previous need of renal replacement therapy (CRRT and transition to hemodialysis) with recovery of renal function enough to discontinue dialysis, dialysis membrane allergy, HTN, type II DM, paroxysmal SVT, ANIBAL on CPAP, asbestosis, prostate cancer, CVA, who was admitted on 10/16/2024 after he was transferred to [facility] from [facility] where he initially presented with altered mental status,

## 2024-10-21 LAB
ANION GAP SERPL CALCULATED.3IONS-SCNC: 10 MMOL/L (ref 7–16)
B.E.: 4.4 MMOL/L (ref -3–3)
BUN SERPL-MCNC: 17 MG/DL (ref 6–23)
CALCIUM SERPL-MCNC: 9.3 MG/DL (ref 8.6–10.2)
CHLORIDE SERPL-SCNC: 107 MMOL/L (ref 98–107)
CO2 SERPL-SCNC: 26 MMOL/L (ref 22–29)
COHB: 0.1 % (ref 0–1.5)
CREAT SERPL-MCNC: 1.5 MG/DL (ref 0.7–1.2)
CRITICAL: ABNORMAL
DATE ANALYZED: ABNORMAL
DATE OF COLLECTION: ABNORMAL
GFR, ESTIMATED: 47 ML/MIN/1.73M2
GLUCOSE BLD-MCNC: 130 MG/DL (ref 74–99)
GLUCOSE BLD-MCNC: 151 MG/DL (ref 74–99)
GLUCOSE BLD-MCNC: 153 MG/DL (ref 74–99)
GLUCOSE BLD-MCNC: 86 MG/DL (ref 74–99)
GLUCOSE SERPL-MCNC: 83 MG/DL (ref 74–99)
HCO3: 29.1 MMOL/L (ref 22–26)
HHB: 6.7 % (ref 0–5)
LAB: ABNORMAL
Lab: 1455
METHB: 0.6 % (ref 0–1.5)
MICROORGANISM SPEC CULT: NORMAL
MODE: ABNORMAL
O2 SATURATION: 93.3 % (ref 92–98.5)
O2HB: 92.6 % (ref 94–97)
OPERATOR ID: 6032
PATIENT TEMP: 37 C
PCO2: 44.2 MMHG (ref 35–45)
PH BLOOD GAS: 7.44 (ref 7.35–7.45)
PO2: 67.4 MMHG (ref 75–100)
POTASSIUM SERPL-SCNC: 3.9 MMOL/L (ref 3.5–5)
SERVICE CMNT-IMP: NORMAL
SODIUM SERPL-SCNC: 143 MMOL/L (ref 132–146)
SOURCE, BLOOD GAS: ABNORMAL
SPECIMEN DESCRIPTION: NORMAL
THB: 10.1 G/DL (ref 11.5–16.5)
TIME ANALYZED: 1501

## 2024-10-21 PROCEDURE — 6360000002 HC RX W HCPCS: Performed by: STUDENT IN AN ORGANIZED HEALTH CARE EDUCATION/TRAINING PROGRAM

## 2024-10-21 PROCEDURE — 36415 COLL VENOUS BLD VENIPUNCTURE: CPT

## 2024-10-21 PROCEDURE — 95800 SLP STDY UNATTENDED: CPT

## 2024-10-21 PROCEDURE — 2060000000 HC ICU INTERMEDIATE R&B

## 2024-10-21 PROCEDURE — 80048 BASIC METABOLIC PNL TOTAL CA: CPT

## 2024-10-21 PROCEDURE — 2700000000 HC OXYGEN THERAPY PER DAY

## 2024-10-21 PROCEDURE — 82805 BLOOD GASES W/O2 SATURATION: CPT

## 2024-10-21 PROCEDURE — 6360000002 HC RX W HCPCS: Performed by: INTERNAL MEDICINE

## 2024-10-21 PROCEDURE — 6370000000 HC RX 637 (ALT 250 FOR IP): Performed by: STUDENT IN AN ORGANIZED HEALTH CARE EDUCATION/TRAINING PROGRAM

## 2024-10-21 PROCEDURE — 82962 GLUCOSE BLOOD TEST: CPT

## 2024-10-21 PROCEDURE — 6370000000 HC RX 637 (ALT 250 FOR IP)

## 2024-10-21 PROCEDURE — 94660 CPAP INITIATION&MGMT: CPT

## 2024-10-21 PROCEDURE — 36600 WITHDRAWAL OF ARTERIAL BLOOD: CPT

## 2024-10-21 PROCEDURE — 2580000003 HC RX 258

## 2024-10-21 PROCEDURE — 2580000003 HC RX 258: Performed by: INTERNAL MEDICINE

## 2024-10-21 RX ADMIN — CARVEDILOL 12.5 MG: 6.25 TABLET, FILM COATED ORAL at 16:35

## 2024-10-21 RX ADMIN — INSULIN GLARGINE 14 UNITS: 100 INJECTION, SOLUTION SUBCUTANEOUS at 21:10

## 2024-10-21 RX ADMIN — HEPARIN SODIUM 5000 UNITS: 5000 INJECTION INTRAVENOUS; SUBCUTANEOUS at 21:10

## 2024-10-21 RX ADMIN — ASPIRIN 81 MG: 81 TABLET, COATED ORAL at 08:39

## 2024-10-21 RX ADMIN — SODIUM CHLORIDE, PRESERVATIVE FREE 10 ML: 5 INJECTION INTRAVENOUS at 21:15

## 2024-10-21 RX ADMIN — BRIMONIDINE TARTRATE 1 DROP: 2 SOLUTION/ DROPS OPHTHALMIC at 21:16

## 2024-10-21 RX ADMIN — SODIUM CHLORIDE 125 MG: 9 INJECTION, SOLUTION INTRAVENOUS at 21:15

## 2024-10-21 RX ADMIN — CARVEDILOL 12.5 MG: 6.25 TABLET, FILM COATED ORAL at 08:39

## 2024-10-21 RX ADMIN — ATORVASTATIN CALCIUM 40 MG: 40 TABLET, FILM COATED ORAL at 08:39

## 2024-10-21 RX ADMIN — HEPARIN SODIUM 5000 UNITS: 5000 INJECTION INTRAVENOUS; SUBCUTANEOUS at 05:48

## 2024-10-21 RX ADMIN — CLOPIDOGREL BISULFATE 75 MG: 75 TABLET ORAL at 08:39

## 2024-10-21 RX ADMIN — SODIUM CHLORIDE, PRESERVATIVE FREE 10 ML: 5 INJECTION INTRAVENOUS at 08:44

## 2024-10-21 RX ADMIN — PANTOPRAZOLE SODIUM 40 MG: 40 TABLET, DELAYED RELEASE ORAL at 08:39

## 2024-10-21 RX ADMIN — ASPIRIN 81 MG: 81 TABLET, COATED ORAL at 21:10

## 2024-10-21 RX ADMIN — BRIMONIDINE TARTRATE 1 DROP: 2 SOLUTION/ DROPS OPHTHALMIC at 09:00

## 2024-10-21 RX ADMIN — HEPARIN SODIUM 5000 UNITS: 5000 INJECTION INTRAVENOUS; SUBCUTANEOUS at 15:31

## 2024-10-21 ASSESSMENT — PAIN SCALES - GENERAL
PAINLEVEL_OUTOF10: 0

## 2024-10-21 NOTE — PROGRESS NOTES
Jah Palomo M.D.,[facility]  Dandre Ceja D.O., FTYSON., [facility]  Trent Live M.D.  Dawn De La Torre M.D.   Alex Snowden D.O.  Enrique Lucas M.D.         Daily Pulmonary Progress Note    Patient:  Aubrey Up 83 y.o. male MRN: [id number]            Synopsis     We are following patient for hypercapnic respiratory failure    \"CC\" confusion    Code status: full code      Subjective      Patient was seen and examined lying in bed on 2 L nasal cannula, SpO2 98%.  Son is present at the bedside.  Patient is difficult to arouse today.  4-channel bedside sleep study scheduled for tonight.      Review of Systems:  Constitutional: Denies fever, weight loss, night sweats, and fatigue  Skin: Denies pigmentation, dark lesions, and rashes   HEENT: Denies hearing loss, tinnitus, ear drainage, epistaxis, sore throat, and hoarseness.  Cardiovascular: Denies palpitations, chest pain, and chest pressure.  Respiratory: Denies cough, dyspnea at rest, hemoptysis, apnea, and choking.  Gastrointestinal: Denies nausea, vomiting, poor appetite, diarrhea, heartburn or reflux  Genitourinary: Denies dysuria, frequency, urgency or hematuria  Musculoskeletal: Denies myalgias, muscle weakness, and bone pain  Neurological: Denies dizziness, vertigo, headache, and focal weakness  Psychological: Denies anxiety and depression  Endocrine: Denies heat intolerance and cold intolerance  Hematopoietic/Lymphatic: Denies bleeding problems and blood transfusions    24-hour events:  No new events    Objective   OBJECTIVE:   BP (!) 146/65   Pulse 73   Temp 97 °F (36.1 °C) (Temporal)   Resp 18   Ht 1.727 m (5' 8\")   Wt 81.6 kg (180 lb)   SpO2 95%   BMI 27.37 kg/m²   SpO2 Readings from Last 1 Encounters:   10/21/24 95%        I/O:  No intake or output data in the 24 hours ending 10/21/24 1701            IPAP: 16 cmH20  CPAP/EPAP: 8 cmH2O     CURRENT MEDS :  Scheduled Meds:   ferric gluconate (FERRLECIT) 125 mg in sodium chloride 0.9 %  100 mL IVPB  125 mg IntraVENous Daily    atorvastatin  40 mg Per NG tube Daily    heparin (porcine)  5,000 Units SubCUTAneous Q8H    aspirin  81 mg Oral BID    brimonidine  1 drop Right Eye BID    carvedilol  12.5 mg Per NG tube BID WC    clopidogrel  75 mg Oral Daily    pantoprazole  40 mg Oral Daily    sodium chloride flush  5-40 mL IntraVENous 2 times per day    insulin lispro  0-8 Units SubCUTAneous 4x Daily AC & HS    insulin glargine  14 Units SubCUTAneous Nightly    sodium chloride  1,000 mL IntraVENous Once       Physical Exam:  General Appearance: appears comfortable in no acute distress.   HEENT: Normocephalic atraumatic without obvious abnormality   Neck: Lips, mucosa, and tongue normal.  Supple, symmetrical, trachea midline, no adenopathy;thyroid:  no enlargement/tenderness/nodules or JVD.  Lung: Breath sounds CTA. Respirations   unlabored. Symmetrical expansion.  Heart: RRR, normal S1, S2. No MRG  Abdomen: Soft, NT, ND. BS present x 4 quadrants. No bruit or organomegaly.   Extremities: Pedal pulses 2+ symmetric b/l.  Extremities normal, no cyanosis, clubbing, or edema.   Musculokeletal: No joint swelling, no muscle tenderness. ROM normal in all joints of extremities.   Neurologic: Mental status: Lethargic.    Pertinent/ New Labs and Imaging Studies     Imaging personally reviewed:  CXR 10/16/24:  FINDINGS:  The lungs are without acute focal process.  There is no effusion or  pneumothorax.  Cardiomegaly process.  The osseous structures are without  acute process.  Bilateral calcified pleural plaques.     IMPRESSION:  No acute process.     Bilateral calcified pleural plaques      Brain MRI 10/17/2024:  FINDINGS:  There is a subcentimeter focus of increased signal on diffusion-weighted  imaging involving the medial right basal ganglia with corresponding  hypointense signal on apparent diffusion coefficient mapping.  No  intracranial hemorrhage.  There is a subcentimeter chronic lacunar stroke  involving  lateral margin of the left thalamus.  There are areas of abnormal  increased T2 and FLAIR signal in the white matter of both hemispheres  suggestive of chronic microvascular ischemia.  Grossly normal appearance of  the intracranial arterial flow voids.  Fluid is seen in the bilateral mastoid  air cells.     IMPRESSION:  1. Subcentimeter acute to subacute stroke involving medial margin of right  basal ganglia.  2. Chronic lacunar stroke is seen involving lateral margin of left thalamus.  3. Fluid is seen in bilateral mastoid air cells which may indicate  mastoiditis.    Labs:  Lab Results   Component Value Date/Time    WBC 4.7 10/20/2024 05:43 AM    RBC 2.93 10/20/2024 05:43 AM    HGB 8.4 10/20/2024 05:43 AM    HCT 27.2 10/20/2024 05:43 AM    MCV 92.8 10/20/2024 05:43 AM    MCH 28.7 10/20/2024 05:43 AM    MCHC 30.9 10/20/2024 05:43 AM    RDW 15.9 10/20/2024 05:43 AM     10/20/2024 05:43 AM    MPV 10.7 10/20/2024 05:43 AM     Lab Results   Component Value Date/Time     10/21/2024 04:51 AM    K 3.9 10/21/2024 04:51 AM     10/21/2024 04:51 AM    CO2 26 10/21/2024 04:51 AM    BUN 17 10/21/2024 04:51 AM    CREATININE 1.5 10/21/2024 04:51 AM    CALCIUM 9.3 10/21/2024 04:51 AM    GFRAA >60 12/14/2021 02:47 PM    LABGLOM 47 10/21/2024 04:51 AM    LABGLOM >60 07/19/2023 11:06 AM      Latest Reference Range & Units 10/16/24 02:33 10/16/24 05:46 10/16/24 09:27 10/16/24 14:44 10/16/24 18:45 10/16/24 19:36 10/17/24 06:33 10/17/24 07:35   Source:  Blood Arterial  Blood Arterial Blood Arterial Blood Arterial   Blood Arterial   pH, Blood Gas 7.350 - 7.450  7.284 (L)  7.236 (L) 7.289 (L) 7.320 (L)   7.369   PCO2 35.0 - 45.0 mmHg 47.7 (H)  51.6 (H) 50.5 (H) 47.0 (H)   34.8 (L)   pO2 75.0 - 100.0 mmHg 143.8 (H)  130.6 (H) 110.3 (H) 131.4 (H)   127.5 (H)   HCO3 22.0 - 26.0 mmol/L 22.1  21.4 (L) 23.7 23.7   19.6 (L)   Base Excess -3.0 - 3.0 mmol/L -4.5 (L)  -6.0 (L) -3.0 -2.5   -5.1 (L)   O2 Sat 92.0 - 98.5 % 98.8 (H)

## 2024-10-21 NOTE — PROGRESS NOTES
Department of Internal Medicine  Nephrology Progress Note      Events reviewed.     SUBJECTIVE: We are following Mr. Aubrey Up for MAMI on CKD. Patient reports no new complaints today.     PHYSICAL EXAM:      Vitals:    VITALS:  BP (!) 129/58   Pulse 62   Temp 97.6 °F (36.4 °C) (Tympanic)   Resp 18   Ht 1.727 m (5' 8\")   Wt 81.6 kg (180 lb)   SpO2 97%   BMI 27.37 kg/m²   24HR BLOOD PRESSURE RANGE:  Systolic (24hrs), Av , Min:105 , Max:168   ; Diastolic (24hrs), Av, Min:55, Max:75    24HR INTAKE/OUTPUT:    Intake/Output Summary (Last 24 hours) at 10/21/2024 0906  Last data filed at 10/20/2024 1155  Gross per 24 hour   Intake 360 ml   Output --   Net 360 ml       Constitutional:  lethargic, confused, on BiPAP  HEENT:  PERRLA   Respiratory:  diminished   Cardiovascular/Edema:  RRR  Gastrointestinal:    Neurologic:  baseline   Skin:  warm, dry  Other:  no edema    Scheduled Meds:   ferric gluconate (FERRLECIT) 125 mg in sodium chloride 0.9 % 100 mL IVPB  125 mg IntraVENous Daily    atorvastatin  40 mg Per NG tube Daily    heparin (porcine)  5,000 Units SubCUTAneous Q8H    aspirin  81 mg Oral BID    brimonidine  1 drop Right Eye BID    carvedilol  12.5 mg Per NG tube BID WC    clopidogrel  75 mg Oral Daily    pantoprazole  40 mg Oral Daily    sodium chloride flush  5-40 mL IntraVENous 2 times per day    insulin lispro  0-8 Units SubCUTAneous 4x Daily AC & HS    insulin glargine  14 Units SubCUTAneous Nightly    sodium chloride  1,000 mL IntraVENous Once     Continuous Infusions:   dextrose      sodium chloride       PRN Meds:.white petrolatum, acetaminophen, ipratropium 0.5 mg-albuterol 2.5 mg, glucose, dextrose bolus **OR** dextrose bolus, glucagon (rDNA), dextrose, sodium chloride flush, sodium chloride, potassium chloride **OR** potassium alternative oral replacement **OR** potassium chloride, magnesium sulfate, ondansetron **OR** ondansetron, acetaminophen **OR** acetaminophen, polyethylene glycol,

## 2024-10-21 NOTE — PLAN OF CARE
Problem: Chronic Conditions and Co-morbidities  Goal: Patient's chronic conditions and co-morbidity symptoms are monitored and maintained or improved  10/20/2024 2306 by Shital Chow RN  Outcome: Progressing     Problem: Discharge Planning  Goal: Discharge to home or other facility with appropriate resources  10/20/2024 2306 by Shital Chow RN  Outcome: Progressing     Problem: Skin/Tissue Integrity  Goal: Absence of new skin breakdown  Description: 1.  Monitor for areas of redness and/or skin breakdown  2.  Assess vascular access sites hourly  3.  Every 4-6 hours minimum:  Change oxygen saturation probe site  4.  Every 4-6 hours:  If on nasal continuous positive airway pressure, respiratory therapy assess nares and determine need for appliance change or resting period.  10/20/2024 2306 by Shital Chow RN  Outcome: Progressing     Problem: ABCDS Injury Assessment  Goal: Absence of physical injury  10/20/2024 2306 by Shital Chow RN  Outcome: Progressing     Problem: Safety - Adult  Goal: Free from fall injury  10/20/2024 2306 by Shital Chow RN  Outcome: Progressing     Problem: Pain  Goal: Verbalizes/displays adequate comfort level or baseline comfort level  10/20/2024 2306 by Shital Chow RN  Outcome: Progressing

## 2024-10-21 NOTE — CARE COORDINATION
Reviewed chart, PFT's ordered and for sleep study tonight. [facility] accepted and now has  private bed for pt and they will hold it. Expand hhc following, will need hhc orders. Per pulmonary, pt can not go home until NIV is approved and delivered. Possible stop at [facility] until it is approved. Will follow. 1pm spoke with Aubrie at [facility], pt has no diagnosis currently, PFT's and sleep study pending. Esme .Aicha Garcia, MSW, LSW

## 2024-10-21 NOTE — PROGRESS NOTES
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    Mixed hyperlipidemia E78.2    H/O asbestosis Z87.09    Lumbar and sacral osteoarthritis M47.817    History of paroxysmal supraventricular tachycardia Z86.79    Need for diphtheria-tetanus-pertussis (Tdap) vaccine Z23    Vertigo R42    Pulmonary nodules R91.8    Abnormal x-ray of pelvis R93.7    Unsteady gait when walking R26.81    Abnormal radionuclide bone scan R94.8    Hip pain, chronic, right M25.551, G89.29    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, G89.29    History of CVA (cerebrovascular accident) Z86.73    Muscle weakness (generalized) M62.81    Lumbar radiculopathy M54.16    Lumbar pain M54.50    High prostate specific antigen (PSA) R97.20    Neuropathy G62.9    Sprain of medial collateral ligament of right knee S83.411A    Acute pain of right knee M25.561    Disorder of liver K76.9    History of prostate cancer Z85.46    NSTEMI (non-ST elevated myocardial infarction) ([facility]) I21.4    Acute coronary syndrome ([facility]) I24.9    Acute confusional state F05    Carotid stenosis, asymptomatic, left I65.22    Hallucinations R44.3    Acute ischemic stroke ([facility]) I63.9       SUBJECTIVE:  Aubrey Up is fully alert and once again repeats multiple times that he belongs at home and not in the hospital.He has not ambulated much today however.He denies any lightheadedness, headache, shortness of breath, palpitations or chest discomfort.    REVIEW OF SYSTEMS:  General ROS: negative for - fatigue, malaise,  weight gain or weight loss  Psychological ROS: negative for - anxiety , depression  Ophthalmic ROS: negative for - decreased vision or visual distortion.  ENT ROS: negative  Allergy and Immunology ROS: negative  Hematological and Lymphatic ROS: negative  Endocrine: no heat or cold intolerance and no  normal direction. RECOMMENDATIONS: ICA       Plaque       ICA/CCA     Degree of PSV      Estimate     PSV Ratio    stenosis <180    No plaque      <2.0          Normal <180  <50% plaque    <2.0           <50% 180-230  >50% plaque  2.0 - 4.0    50-69% >230    >50% plaque      >4.0         >70% ** -180 cm/sec and ICA/CCA PSV Ratio = 2.0 is also consistent with 50-69% stenosis.     MRI BRAIN WO CONTRAST    Result Date: 10/17/2024  1. Subcentimeter acute to subacute stroke involving medial margin of right basal ganglia. 2. Chronic lacunar stroke is seen involving lateral margin of left thalamus. 3. Fluid is seen in bilateral mastoid air cells which may indicate mastoiditis.         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    Mixed hyperlipidemia E78.2    H/O asbestosis Z87.09    Lumbar and sacral osteoarthritis M47.817    History of paroxysmal supraventricular tachycardia Z86.79    Need for diphtheria-tetanus-pertussis (Tdap) vaccine Z23    Vertigo R42    Pulmonary nodules R91.8    Abnormal x-ray of pelvis R93.7    Unsteady gait when walking R26.81    Abnormal radionuclide bone scan R94.8    Hip pain, chronic, right M25.551, G89.29    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, G89.29    History of CVA (cerebrovascular accident) Z86.73    Muscle weakness (generalized) M62.81    Lumbar radiculopathy M54.16    Lumbar pain M54.50    High prostate specific antigen (PSA) R97.20    Neuropathy G62.9    Sprain of medial collateral ligament of right knee S83.411A    Acute pain of right knee M25.561    Disorder of liver K76.9    History of prostate cancer Z85.46    NSTEMI (non-ST elevated myocardial infarction) (HCC) I21.4    Acute coronary syndrome (HCC) I24.9    Acute confusional state F05    Carotid stenosis, asymptomatic, left I65.22     Hallucinations R44.3    Acute ischemic stroke (HCC) I63.9       RECOMMENDATIONS:  Cardiac status remained stable for the most part and would thus complete his sleep study and what ever other noninvasive studies as indicated.He remains quite anxious to be discharged and his wife notes that he has been up and around in his room with help although somewhat lagging behind withOnHis right lower leg.    I have spent more than 26 minutes face to face with Aubrey Up and reviewing notes and laboratory data, with greater than 50% of this time instructing and counseling the patient and his wife face to face regarding my findings and recommendations and I have answered all questions as posed to me by Mr. Up and his family members were present at his bedside.present at his bedside.    Santy Marie, DO FACP,FACC,[facility]      NOTE:  This report was transcribed using voice recognition software.  Every effort was made to ensure accuracy; however, inadvertent computerized transcription errors may be present

## 2024-10-21 NOTE — PROGRESS NOTES
HOSPITALIST PROGRESS NOTE    Patient's name: Aubrey Up  : 1940  Admission date: 10/16/2024  Date of service: 10/21/2024   Primary care physician: Dionne Singh DO    Assessment   Patient admitted on 10/16/2024     Aubrey Up is a 83 y.o. male patient of Dionne iSngh DO with history of CVA, gastroparesis, TIA, sleep apnea on CPAP, history of asbestosis, dysphagia s/p PEG tube placement on tube feeds presented to [facility] on 10/16/2024 from [facility] where he had initially presented with concern of confusion, auditory and visual hallucination.  Patient had a recent prolonged hospitalization for pneumonia and renal failure and was discharged home after rehab in early October.  While at outlying facility patient found to have elevated troponin and patient was transferred to [facility] main campus for NSTEMI and cardiology evaluation.    Possible NSTEMI  Eval by cardiology, troponins have been flat and are downtrending  Heparin has been turned off  Continue aspirin, Plavix, statin  Was evaluated by cardiology, cleared for discharge.    Acute/subacute right basal ganglia stroke  MRI brain reviewed showing subcentimeter acute to subacute stroke in the right basal ganglia  Patient has no obvious focal deficits, speech is clear  Continue aspirin Plavix and statin  PT/OT.    MAMI-improved  Patient's baseline creatinine seems to be around 2, creatinine elevated 2.8 on admission  Nephrology was consulted  Renal function is improving  Currently on IV fluids    Possible UTI  Treated with IV ceftriaxone while in the hospital.    Obstructive sleep apnea  Restrictive lung disease  Asbestosis  Acute on chronic hypoxic and hypercapnic respiratory failure  Patient would benefit from AVAPS/NIV use at at bedtime and as needed  This patient would benefit from non-invasive therapy, utilizing AVAPS AE to provide a targeted tidal volume. It is therefore required    Medication Dose Route Frequency Provider Last Rate Last Admin    ferric gluconate (FERRLECIT) 125 mg in sodium chloride 0.9 % 100 mL IVPB  125 mg IntraVENous Daily Jaspal Franklin MD   Stopped at 10/21/24 0043    atorvastatin (LIPITOR) tablet 40 mg  40 mg Per NG tube Daily Edison Bloom MD   40 mg at 10/21/24 0839    heparin (porcine) injection 5,000 Units  5,000 Units SubCUTAneous Q8H Edison Bloom MD   5,000 Units at 10/21/24 0548    white petrolatum ointment   Topical BID PRN Edison Bloom MD        acetaminophen (TYLENOL) tablet 650 mg  650 mg Per NG tube Q6H PRN Jag Dodd, NAZARIO - CNP        aspirin EC tablet 81 mg  81 mg Oral BID Jag Dodd APRN - CNP   81 mg at 10/21/24 0839    brimonidine (ALPHAGAN) 0.2 % ophthalmic solution 1 drop  1 drop Right Eye BID Jag Dodd APRN - CNP   1 drop at 10/20/24 2059    carvedilol (COREG) tablet 12.5 mg  12.5 mg Per NG tube BID  Tamiko Michel MD   12.5 mg at 10/21/24 0839    clopidogrel (PLAVIX) tablet 75 mg  75 mg Oral Daily Jag Dodd APRN - CNP   75 mg at 10/21/24 0839    ipratropium 0.5 mg-albuterol 2.5 mg (DUONEB) nebulizer solution 1 Dose  1 Dose Inhalation Q4H PRN Jag Dodd APRN - SARAH        pantoprazole (PROTONIX) tablet 40 mg  40 mg Oral Daily Jag Dodd APRN - CNP   40 mg at 10/21/24 0839    glucose chewable tablet 16 g  4 tablet Oral PRN Jag Dodd APRN - CNP        dextrose bolus 10% 125 mL  125 mL IntraVENous PRN Jag Dodd APRN - CNP        Or    dextrose bolus 10% 250 mL  250 mL IntraVENous PRN Jag Dodd, APRN - CNP        glucagon injection 1 mg  1 mg SubCUTAneous PRN Jag Dodd APRN - CNP        dextrose 10 % infusion   IntraVENous Continuous PRN Jag Dodd APRN - CNP        sodium chloride flush 0.9 % injection 5-40 mL  5-40 mL IntraVENous 2 times per day Jag Dodd, APRN - CNP   10 mL at 10/21/24 0844    sodium chloride flush 0.9 % injection 5-40 mL   5-40 mL IntraVENous PRN Jag Dodd APRN - CNP   10 mL at 10/20/24 1421    0.9 % sodium chloride infusion   IntraVENous PRN Jag Dodd APRN - CNP        potassium chloride (KLOR-CON M) extended release tablet 40 mEq  40 mEq Oral PRN Jag Dodd APRN - CNP        Or    potassium bicarb-citric acid (EFFER-K) effervescent tablet 40 mEq  40 mEq Oral PRN Jag Dodd APRN - CNP        Or    potassium chloride 10 mEq/100 mL IVPB (Peripheral Line)  10 mEq IntraVENous PRN Jag Dodd APRN - CNP        magnesium sulfate 2000 mg in 50 mL IVPB premix  2,000 mg IntraVENous PRN Jag Dodd APRN - CNP        ondansetron (ZOFRAN-ODT) disintegrating tablet 4 mg  4 mg Oral Q8H PRN Jag Dodd APRN - CNP        Or    ondansetron (ZOFRAN) injection 4 mg  4 mg IntraVENous Q6H PRN Jag Dodd APRN - CNP        acetaminophen (TYLENOL) tablet 650 mg  650 mg Oral Q6H PRN Jag Dodd APRN - CNP   650 mg at 10/19/24 1706    Or    acetaminophen (TYLENOL) suppository 650 mg  650 mg Rectal Q6H PRN Jag Dodd APRN - CNP        polyethylene glycol (GLYCOLAX) packet 17 g  17 g Oral Daily PRN Jag Dodd APRN - CNP        aluminum & magnesium hydroxide-simethicone (MAALOX) 200-200-20 MG/5ML suspension 30 mL  30 mL Oral Q6H PRN Jag Dodd APRN - CNP        insulin lispro (HUMALOG,ADMELOG) injection vial 0-8 Units  0-8 Units SubCUTAneous 4x Daily AC & HS Jag Dodd APRN - CNP        insulin glargine (LANTUS) injection vial 14 Units  14 Units SubCUTAneous Nightly Jag Dodd APRN - CNP   14 Units at 10/20/24 2052    sodium chloride 0.9 % bolus 1,000 mL  1,000 mL IntraVENous Once Tamiko Michel MD           PRN Medications  white petrolatum, acetaminophen, ipratropium 0.5 mg-albuterol 2.5 mg, glucose, dextrose bolus **OR** dextrose bolus, glucagon (rDNA), dextrose, sodium chloride flush, sodium chloride, potassium chloride **OR** potassium alternative  oral replacement **OR** potassium chloride, magnesium sulfate, ondansetron **OR** ondansetron, acetaminophen **OR** acetaminophen, polyethylene glycol, aluminum & magnesium hydroxide-simethicone    +++++++++++++++++++++++++++++++++++++++++++++++++  Edison Bloom MD    [place], [place]  +++++++++++++++++++++++++++++++++++++++++++++++++  NOTE: This report was transcribed using voice recognition software. Every effort was made to ensure accuracy; however, inadvertent computerized transcription errors may be present.    I can be reached through [employer].

## 2024-10-21 NOTE — PROGRESS NOTES
Date: 10/21/2024    Time: 12:46 AM    Patient Placed On BIPAP/CPAP/ Non-Invasive Ventilation?  Yes    If no must comment.  Facial area red/color change? No           If YES are Blister/Lesion present?No   If yes must notify nursing staff  BIPAP/CPAP skin barrier?  Yes    Skin barrier type:mepilexlite       Comments:        Jaye Garcias RCP

## 2024-10-22 ENCOUNTER — TELEPHONE (OUTPATIENT)
Dept: VASCULAR SURGERY | Age: 84
End: 2024-10-22

## 2024-10-22 LAB
ANION GAP SERPL CALCULATED.3IONS-SCNC: 10 MMOL/L (ref 7–16)
BUN SERPL-MCNC: 16 MG/DL (ref 6–23)
CALCIUM SERPL-MCNC: 9.3 MG/DL (ref 8.6–10.2)
CHLORIDE SERPL-SCNC: 108 MMOL/L (ref 98–107)
CO2 SERPL-SCNC: 27 MMOL/L (ref 22–29)
CREAT SERPL-MCNC: 1.5 MG/DL (ref 0.7–1.2)
ERYTHROCYTE [DISTWIDTH] IN BLOOD BY AUTOMATED COUNT: 15.9 % (ref 11.5–15)
GFR, ESTIMATED: 47 ML/MIN/1.73M2
GLUCOSE BLD-MCNC: 122 MG/DL (ref 74–99)
GLUCOSE BLD-MCNC: 132 MG/DL (ref 74–99)
GLUCOSE BLD-MCNC: 89 MG/DL (ref 74–99)
GLUCOSE SERPL-MCNC: 94 MG/DL (ref 74–99)
HCT VFR BLD AUTO: 28.5 % (ref 37–54)
HGB BLD-MCNC: 8.8 G/DL (ref 12.5–16.5)
MCH RBC QN AUTO: 28.8 PG (ref 26–35)
MCHC RBC AUTO-ENTMCNC: 30.9 G/DL (ref 32–34.5)
MCV RBC AUTO: 93.1 FL (ref 80–99.9)
PLATELET # BLD AUTO: 185 K/UL (ref 130–450)
PMV BLD AUTO: 10.9 FL (ref 7–12)
POTASSIUM SERPL-SCNC: 3.8 MMOL/L (ref 3.5–5)
RBC # BLD AUTO: 3.06 M/UL (ref 3.8–5.8)
SODIUM SERPL-SCNC: 145 MMOL/L (ref 132–146)
WBC OTHER # BLD: 5.7 K/UL (ref 4.5–11.5)

## 2024-10-22 PROCEDURE — 6370000000 HC RX 637 (ALT 250 FOR IP): Performed by: STUDENT IN AN ORGANIZED HEALTH CARE EDUCATION/TRAINING PROGRAM

## 2024-10-22 PROCEDURE — 85027 COMPLETE CBC AUTOMATED: CPT

## 2024-10-22 PROCEDURE — 2580000003 HC RX 258: Performed by: INTERNAL MEDICINE

## 2024-10-22 PROCEDURE — 97535 SELF CARE MNGMENT TRAINING: CPT

## 2024-10-22 PROCEDURE — 80048 BASIC METABOLIC PNL TOTAL CA: CPT

## 2024-10-22 PROCEDURE — 2580000003 HC RX 258

## 2024-10-22 PROCEDURE — 94660 CPAP INITIATION&MGMT: CPT

## 2024-10-22 PROCEDURE — 36415 COLL VENOUS BLD VENIPUNCTURE: CPT

## 2024-10-22 PROCEDURE — 6360000002 HC RX W HCPCS: Performed by: STUDENT IN AN ORGANIZED HEALTH CARE EDUCATION/TRAINING PROGRAM

## 2024-10-22 PROCEDURE — 6370000000 HC RX 637 (ALT 250 FOR IP)

## 2024-10-22 PROCEDURE — 97530 THERAPEUTIC ACTIVITIES: CPT

## 2024-10-22 PROCEDURE — 2060000000 HC ICU INTERMEDIATE R&B

## 2024-10-22 PROCEDURE — 2700000000 HC OXYGEN THERAPY PER DAY

## 2024-10-22 PROCEDURE — 82962 GLUCOSE BLOOD TEST: CPT

## 2024-10-22 PROCEDURE — 6360000002 HC RX W HCPCS: Performed by: INTERNAL MEDICINE

## 2024-10-22 RX ORDER — LISINOPRIL 10 MG/1
10 TABLET ORAL DAILY
Status: DISCONTINUED | OUTPATIENT
Start: 2024-10-22 | End: 2024-10-23 | Stop reason: HOSPADM

## 2024-10-22 RX ORDER — ATORVASTATIN CALCIUM 20 MG/1
40 TABLET, FILM COATED ORAL DAILY
Qty: 30 TABLET | Refills: 3 | Status: SHIPPED | OUTPATIENT
Start: 2024-10-22 | End: 2024-10-23

## 2024-10-22 RX ADMIN — ASPIRIN 81 MG: 81 TABLET, COATED ORAL at 21:22

## 2024-10-22 RX ADMIN — SODIUM CHLORIDE 125 MG: 9 INJECTION, SOLUTION INTRAVENOUS at 21:28

## 2024-10-22 RX ADMIN — SODIUM CHLORIDE, PRESERVATIVE FREE 10 ML: 5 INJECTION INTRAVENOUS at 09:10

## 2024-10-22 RX ADMIN — CARVEDILOL 12.5 MG: 6.25 TABLET, FILM COATED ORAL at 17:24

## 2024-10-22 RX ADMIN — INSULIN GLARGINE 14 UNITS: 100 INJECTION, SOLUTION SUBCUTANEOUS at 21:19

## 2024-10-22 RX ADMIN — SODIUM CHLORIDE, PRESERVATIVE FREE 10 ML: 5 INJECTION INTRAVENOUS at 21:19

## 2024-10-22 RX ADMIN — HEPARIN SODIUM 5000 UNITS: 5000 INJECTION INTRAVENOUS; SUBCUTANEOUS at 05:47

## 2024-10-22 RX ADMIN — ASPIRIN 81 MG: 81 TABLET, COATED ORAL at 09:07

## 2024-10-22 RX ADMIN — BRIMONIDINE TARTRATE 1 DROP: 2 SOLUTION/ DROPS OPHTHALMIC at 22:39

## 2024-10-22 RX ADMIN — CLOPIDOGREL BISULFATE 75 MG: 75 TABLET ORAL at 09:07

## 2024-10-22 RX ADMIN — HEPARIN SODIUM 5000 UNITS: 5000 INJECTION INTRAVENOUS; SUBCUTANEOUS at 13:00

## 2024-10-22 RX ADMIN — HEPARIN SODIUM 5000 UNITS: 5000 INJECTION INTRAVENOUS; SUBCUTANEOUS at 21:19

## 2024-10-22 RX ADMIN — LISINOPRIL 10 MG: 10 TABLET ORAL at 12:59

## 2024-10-22 RX ADMIN — PANTOPRAZOLE SODIUM 40 MG: 40 TABLET, DELAYED RELEASE ORAL at 09:07

## 2024-10-22 RX ADMIN — CARVEDILOL 12.5 MG: 6.25 TABLET, FILM COATED ORAL at 09:07

## 2024-10-22 RX ADMIN — BRIMONIDINE TARTRATE 1 DROP: 2 SOLUTION/ DROPS OPHTHALMIC at 09:00

## 2024-10-22 RX ADMIN — ATORVASTATIN CALCIUM 40 MG: 40 TABLET, FILM COATED ORAL at 09:07

## 2024-10-22 ASSESSMENT — PAIN SCALES - GENERAL: PAINLEVEL_OUTOF10: 0

## 2024-10-22 NOTE — PROGRESS NOTES
OT BEDSIDE TREATMENT NOTE   [facility]  [address], [place]      Date:10/22/2024  Patient Name: Aubrey Up  MRN: [id number]  : 1940  Room: [address] [place] [address]     Evaluating OT: ARGELIA Pepe, OTR/L  # [id number]     Referring Provider:  Edison Bloom MD   Specific Provider Orders:  \"OT Eval and Treat\"  10-18-24     Diagnosis: NSTEMI (non-ST elevated myocardial infarction) ([facility]) [I21.4]     Pt was admitted w/ Confusion, Auditory and Visual Hallucinations.  Found w/ Elevated Troponin, MRI Revealed \"Subcentimeter acute to subacute stroke involving medial margin of right  basal ganglia.\"      Pertinent Medical History:  Pt has a past medical history of Acute respiratory failure ([facility]), CVA (cerebral vascular accident) ([facility]), Diabetes mellitus ([facility]), Dysphagia, Gastroparesis, H/O asbestosis, History of paroxysmal supraventricular tachycardia, Neuropathy, Prostate CA ([facility]), Sleep apnea with use of continuous positive airway pressure (CPAP), and TIA (transient ischemic attack).,  has a past surgical history that includes Prostate biopsy; Knee Arthroplasty; Cholecystectomy; Cervical disc surgery; and Upper gastrointestinal endoscopy (N/A, 2024).     Surgeries this admission: None      Precautions:  Fall Risk  Cognition - Alarms  [place], Jose L hearing aids        Assessment of current deficits   [x] Functional mobility             [x]ADLs           [x] Strength                  [x]Cognition   [x] Functional transfers           [x] IADLs         [x] Safety Awareness   [x]Endurance   [] Fine Coordination              [x] Balance     [] Vision/perception    []Sensation     []Gross Motor Coordination  [] ROM           [] Delirium                  [] Motor Control         OT PLAN OF CARE   OT POC based on physician orders, patient diagnosis and results of clinical assessment     Frequency/Duration 1-3 days/wk for 2 weeks PRN   Specific OT Treatment  Min A w/ WW    Dynamic:  Min A w/ ww Sitting:     Static:  IND    Dynamic:  IND w/ functional ax     Standing:     Static:  Mod I w/ AD PRN    Dynamic:  Mod I w/ functional ax/mobility w/ AD PRN   Activity Tolerance Fair     Sitting Tolerance w/ light ax ~ 20 mins, In chair extended time  Standing Tolerance w/ light ax ~ 5 mins    Fair tolerance with light seated activities  Fair(+)   Visual/  Perceptual     Hearing: WFL - good scanning/tracking, no visual neglect noted  Glasses: Yes     Moderately Severe hearing loss  Hearing Aids:  Jose L Hearing aids    WFL       Vitals    SPO2 during activity: 89% on 2L NC  SPO2 seated at rest: 94% on 2L NC        Hand Dominance: Right    AROM Strength Additional Info:    RUE  WFL Grossly 4+/5 throughout Good(-) ;   Fair FMC/dexterity noted during ADL tasks - limited by Chronic Numbness r/t Neuropathy      LUE WFL Same as Right Good(-) ;    Fair FMC/dexterity noted during ADL tasks         Sensation:  Reported Chronic Numbness and tingling Jose L UEs/LEs  Tone: WFL Jose L UEs   Edema: None Noted Jose L UEs     Comments: Upon arrival pt supine in bed. Pt educated on techniques to increase independence and safety during ADL's, bed mobility, and functional transfers. ADL retraining to increase safety and I in dressing and grooming tasks, balance and trf training to increase participation in functional mobility and standing aspects of ADLs with increased safety. Pt educated on use of AE reacher and sock aid to increase independence with LB dressing with fair+ carryover noted At end of session pt left semi-supine in bed, tray table and call light within reach, bed alarm on.     Pt has made Fair progress towards set goals.     Continue with current plan of care    Treatment Time In:0914            Treatment Time Out: 0946             Treatment Charges: Mins Units   Ther Ex  [id number]     Manual Therapy 37025     Thera Activities [id number] 13 [id number]   ADL/Home Mgt [id number] 19 1   Neuro Re-ed [id number]

## 2024-10-22 NOTE — PROGRESS NOTES
PFT lab called to request a time of completion for PFTs     Electronically signed by Rita Hernandez RN on 10/22/2024 at 11:44 AM

## 2024-10-22 NOTE — PROGRESS NOTES
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    Mixed hyperlipidemia E78.2    H/O asbestosis Z87.09    Lumbar and sacral osteoarthritis M47.817    History of paroxysmal supraventricular tachycardia Z86.79    Need for diphtheria-tetanus-pertussis (Tdap) vaccine Z23    Vertigo R42    Pulmonary nodules R91.8    Abnormal x-ray of pelvis R93.7    Unsteady gait when walking R26.81    Abnormal radionuclide bone scan R94.8    Hip pain, chronic, right M25.551, G89.29    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, G89.29    History of CVA (cerebrovascular accident) Z86.73    Muscle weakness (generalized) M62.81    Lumbar radiculopathy M54.16    Lumbar pain M54.50    High prostate specific antigen (PSA) R97.20    Neuropathy G62.9    Sprain of medial collateral ligament of right knee S83.411A    Acute pain of right knee M25.561    Disorder of liver K76.9    History of prostate cancer Z85.46    NSTEMI (non-ST elevated myocardial infarction) ([facility]) I21.4    Acute coronary syndrome ([facility]) I24.9    Acute confusional state F05    Carotid stenosis, asymptomatic, left I65.22    Hallucinations R44.3    Acute ischemic stroke ([facility]) I63.9       SUBJECTIVE:  Aubrey Up is fully alert and comfortable and notes that he wishes to be discharged home once again and is awaiting his sleep study..    REVIEW OF SYSTEMS:  General ROS: negative for - fatigue, malaise,  weight gain or weight loss  Psychological ROS: negative for - anxiety , depression  Ophthalmic ROS: negative for - decreased vision or visual distortion.  ENT ROS: negative  Allergy and Immunology ROS: negative  Hematological and Lymphatic ROS: negative  Endocrine: no heat or cold intolerance and no polyphagia, polydipsia, or polyuria  Respiratory ROS: positive for - shortness of breath- Improved  Cardiovascular ROS:  at the apex. No clicks, rub, palpable thrills   or heaves. PMI nondisplaced, 5th intercostal space MCL.   Abdomen: Soft, nontender, nondistended,  moderately protuberant, no masses or organomegaly.  Bowel sounds active.  Extremities: Without clubbing, cyanosis or edema. Pulses present 3+ upper extermities bilaterally; present 1+ DP  bilaterally and present 1+ PT bilaterally.     Data:   Scheduled Meds: Reviewed  Continuous Infusions:    dextrose      sodium chloride       No intake or output data in the 24 hours ending 10/21/24 2306    CBC:   Recent Labs     10/20/24  0543   WBC 4.7   HGB 8.4*   HCT 27.2*        BMP:  Recent Labs     10/20/24  0543 10/21/24  0451    143   K 3.7 3.9    107   CO2 26 26   BUN 18 17   CREATININE 1.5* 1.5*   LABGLOM 46* 47*     ABGs:   Lab Results   Component Value Date/Time    PH 7.437 10/21/2024 02:55 PM    PO2 67.4 10/21/2024 02:55 PM    PCO2 44.2 10/21/2024 02:55 PM     INR: No results for input(s): \"INR\" in the last 72 hours.  PRO-BNP:   Lab Results   Component Value Date    PROBNP 3,879 (H) 08/07/2024      TSH:   Lab Results   Component Value Date    TSH 2.35 10/16/2024      Cardiac Injury Profile:   No results for input(s): \"TROPHS\" in the last 72 hours.     Lipid Profile:   Lab Results   Component Value Date/Time    TRIG 134 10/17/2024 06:33 AM    HDL 28 10/17/2024 06:33 AM    CHOL 94 10/17/2024 06:33 AM      Hemoglobin A1C: No components found for: \"HGBA1C\"      RAD:   Vascular duplex carotid bilateral    Result Date: 10/18/2024  The right internal carotid artery demonstrates 0-50% stenosis. The left internal carotid artery demonstrates 70-99% stenosis. Bilateral vertebral arteries are patent with flow in the normal direction. RECOMMENDATIONS: ICA       Plaque       ICA/CCA     Degree of PSV      Estimate     PSV Ratio    stenosis <180    No plaque      <2.0          Normal <180  <50% plaque    <2.0           <50% 180-230  >50% plaque  2.0 - 4.0    50-69% >230  somewhat more complicated.  Should he require carotid surgical interventionThen more definitive cardiac studies will be required despite  evidence of significant renal insufficiency.    I have spent more than 25 minutes face to face with Aubrey Up and reviewing notes and laboratory data, with greater than 50% of this time instructing and counseling the patient and his wife face to face regarding my findings and recommendations and I have answered all questions as posed to me by Mr. Up and his family member who was present at his bedside.present at his bedside.    Santy Marie, DO FACP,FACC,[facility]      NOTE:  This report was transcribed using voice recognition software.  Every effort was made to ensure accuracy; however, inadvertent computerized transcription errors may be present

## 2024-10-22 NOTE — PROGRESS NOTES
Department of Internal Medicine  Nephrology Progress Note      Events reviewed.     SUBJECTIVE: We are following Mr. Aubrey Up for MAMI on CKD. Patient reports no new complaints today.     PHYSICAL EXAM:      Vitals:    VITALS:  BP (!) 154/68   Pulse 62   Temp 97.4 °F (36.3 °C) (Temporal)   Resp 16   Ht 1.727 m (5' 8\")   Wt 81.6 kg (180 lb)   SpO2 98%   BMI 27.37 kg/m²   24HR BLOOD PRESSURE RANGE:  Systolic (24hrs), Av , Min:126 , Max:154   ; Diastolic (24hrs), Av, Min:54, Max:78    24HR INTAKE/OUTPUT:  No intake or output data in the 24 hours ending 10/22/24 1119      Constitutional:  lethargic, confused, on BiPAP  HEENT:  PERRLA   Respiratory:  diminished   Cardiovascular/Edema:  RRR  Gastrointestinal:    Neurologic:  baseline   Skin:  warm, dry  Other:  no edema    Scheduled Meds:   lisinopril  10 mg Oral Daily    ferric gluconate (FERRLECIT) 125 mg in sodium chloride 0.9 % 100 mL IVPB  125 mg IntraVENous Daily    atorvastatin  40 mg Per NG tube Daily    heparin (porcine)  5,000 Units SubCUTAneous Q8H    aspirin  81 mg Oral BID    brimonidine  1 drop Right Eye BID    carvedilol  12.5 mg Per NG tube BID WC    clopidogrel  75 mg Oral Daily    pantoprazole  40 mg Oral Daily    sodium chloride flush  5-40 mL IntraVENous 2 times per day    insulin lispro  0-8 Units SubCUTAneous 4x Daily AC & HS    insulin glargine  14 Units SubCUTAneous Nightly    sodium chloride  1,000 mL IntraVENous Once     Continuous Infusions:   dextrose      sodium chloride       PRN Meds:.white petrolatum, acetaminophen, ipratropium 0.5 mg-albuterol 2.5 mg, glucose, dextrose bolus **OR** dextrose bolus, glucagon (rDNA), dextrose, sodium chloride flush, sodium chloride, potassium chloride **OR** potassium alternative oral replacement **OR** potassium chloride, magnesium sulfate, ondansetron **OR** ondansetron, acetaminophen **OR** acetaminophen, polyethylene glycol, aluminum & magnesium hydroxide-simethicone        DATA:    CBC  with Differential:    Lab Results   Component Value Date/Time    WBC 5.7 10/22/2024 06:41 AM    RBC 3.06 10/22/2024 06:41 AM    HGB 8.8 10/22/2024 06:41 AM    HCT 28.5 10/22/2024 06:41 AM     10/22/2024 06:41 AM    MCV 93.1 10/22/2024 06:41 AM    MCH 28.8 10/22/2024 06:41 AM    MCHC 30.9 10/22/2024 06:41 AM    RDW 15.9 10/22/2024 06:41 AM    LYMPHOPCT 18 08/19/2024 03:50 AM    MONOPCT 10 08/19/2024 03:50 AM    EOSPCT 6 08/19/2024 03:50 AM    BASOPCT 1 08/19/2024 03:50 AM    MONOSABS 0.63 08/19/2024 03:50 AM    LYMPHSABS 1.10 08/19/2024 03:50 AM    EOSABS 0.35 08/19/2024 03:50 AM    BASOSABS 0.06 08/19/2024 03:50 AM     CMP:    Lab Results   Component Value Date/Time     10/22/2024 06:41 AM    K 3.8 10/22/2024 06:41 AM     10/22/2024 06:41 AM    CO2 27 10/22/2024 06:41 AM    BUN 16 10/22/2024 06:41 AM    CREATININE 1.5 10/22/2024 06:41 AM    GFRAA >60 12/14/2021 02:47 PM    AGRATIO 1.2 02/08/2022 12:40 PM    LABGLOM 47 10/22/2024 06:41 AM    LABGLOM >60 07/19/2023 11:06 AM    GLUCOSE 94 10/22/2024 06:41 AM    GLUCOSE 99 05/20/2021 10:57 AM    CALCIUM 9.3 10/22/2024 06:41 AM    BILITOT 0.7 08/19/2024 03:50 AM    ALKPHOS 158 08/19/2024 03:50 AM    AST 27 08/19/2024 03:50 AM    ALT 22 08/19/2024 03:50 AM     Magnesium:    Lab Results   Component Value Date/Time    MG 1.7 05/17/2021 07:00 PM     Phosphorus:    Lab Results   Component Value Date/Time    PHOS 2.2 08/12/2024 03:21 AM     Radiology Review:  pending     BRIEF SUMMARY OF INITIAL CONSULT:    Briefly Mr. Aubrey Up is an 83-year-old male with a PMH of CKD stage IV, due to ischemic ATN with previous need of renal replacement therapy (CRRT and transition to hemodialysis) with recovery of renal function enough to discontinue dialysis, dialysis membrane allergy, HTN, type II DM, paroxysmal SVT, ANIBAL on CPAP, asbestosis, prostate cancer, CVA, who was admitted on 10/16/2024 after he was transferred to [facility] from [facility] where he initially

## 2024-10-22 NOTE — PLAN OF CARE
Problem: Chronic Conditions and Co-morbidities  Goal: Patient's chronic conditions and co-morbidity symptoms are monitored and maintained or improved  10/22/2024 0113 by Shital Chow RN  Outcome: Progressing     Problem: Discharge Planning  Goal: Discharge to home or other facility with appropriate resources  Outcome: Progressing     Problem: Skin/Tissue Integrity  Goal: Absence of new skin breakdown  Description: 1.  Monitor for areas of redness and/or skin breakdown  2.  Assess vascular access sites hourly  3.  Every 4-6 hours minimum:  Change oxygen saturation probe site  4.  Every 4-6 hours:  If on nasal continuous positive airway pressure, respiratory therapy assess nares and determine need for appliance change or resting period.  Outcome: Progressing     Problem: ABCDS Injury Assessment  Goal: Absence of physical injury  Outcome: Progressing     Problem: Safety - Adult  Goal: Free from fall injury  Outcome: Progressing     Problem: Pain  Goal: Verbalizes/displays adequate comfort level or baseline comfort level  Outcome: Progressing

## 2024-10-22 NOTE — FLOWSHEET NOTE
Inpatient Wound Care(initial evaluation)  [id number]    Admit Date: 10/16/2024 12:07 AM    Reason for consult:  buttocks    Significant history:  per H & P  Past medical history of Acute respiratory failure (HCC), CVA (cerebral vascular accident) (HCC), Diabetes mellitus (HCC), Dysphagia, Gastroparesis, H/O asbestosis, History of paroxysmal supraventricular tachycardia, Neuropathy, Prostate CA (HCC), Sleep apnea with use of continuous positive airway pressure (CPAP), and TIA (transient ischemic attack).  Patient initially presented to [facility] due to increased confusion as well as auditory and visual hallucinations. Of note, patient had recent prolonged hospitalization followed by rehab stay that included intubation for pneumonia as well as CVVHD and hemodialysis, he was discharged to home early in October.  Hospital course at [facility] significant for normal ammonia level, CT scan showed only age-related changes, EKG showed no acute ST elevation but troponin was noted to be elevated at 322 with repeat greater than 500.  Decision was made to transfer patient to [facility] main campus for NSTEMI with consult placed for cardiology.     Findings:     10/22/24 1430   Skin Integumentary    Skin Integrity Ecchymosis  (dried scabs)   Location BUE   Skin Integrity Site 2   Skin Integrity Location 2 Vascular discoloration  (dry flaky skin, dried scabs)   Location 2 BLE   Skin Integrity Site 3   Skin Integrity Location 3 Erosion/denuded  (maceration)    Location 3 coccyx, left buttocks   Skin Integrity Site 4   Skin Integrity Location 4   (blnachable redness)   Location 4 left heel       **Informed Consent**    photos taken of buttocks, left heel and inserted into their chart as part of their permanent medical record for purposes of documentation, treatment management and/or medical review.   All Images taken on 10/22/24 of patient name: Aubrey Up were transmitted and stored on secured [employer]

## 2024-10-22 NOTE — PROGRESS NOTES
HOSPITALIST PROGRESS NOTE    Patient's name: Aubrey Up  : 1940  Admission date: 10/16/2024  Date of service: 10/22/2024   Primary care physician: Dionne Singh DO    Assessment   Patient admitted on 10/16/2024     Aubrey Up is a 83 y.o. male patient of Dionne Singh DO with history of CVA, gastroparesis, TIA, sleep apnea on CPAP, history of asbestosis, dysphagia s/p PEG tube placement on tube feeds presented to [facility] on 10/16/2024 from [facility] where he had initially presented with concern of confusion, auditory and visual hallucination.  Patient had a recent prolonged hospitalization for pneumonia and renal failure and was discharged home after rehab in early October.  While at outlying facility patient found to have elevated troponin and patient was transferred to [facility] main campus for NSTEMI and cardiology evaluation.    Possible NSTEMI  Eval by cardiology, troponins have been flat and are downtrending  Heparin has been turned off  Continue aspirin, Plavix, statin  Was evaluated by cardiology, cleared for discharge.    Acute/subacute right basal ganglia stroke  MRI brain reviewed showing subcentimeter acute to subacute stroke in the right basal ganglia  Patient has no obvious focal deficits, speech is clear  Continue aspirin Plavix and statin  PT/OT.    MAMI-improved  Patient's baseline creatinine seems to be around 2, creatinine elevated 2.8 on admission  Nephrology was consulted  Renal function is improving  Lisinopril resumed.    Possible UTI  Treated with IV ceftriaxone while in the hospital.    Obstructive sleep apnea  Restrictive lung disease  Asbestosis  Acute on chronic hypoxic and hypercapnic respiratory failure  Patient would benefit from AVAPS/NIV use at at bedtime and as needed  This patient would benefit from non-invasive therapy, utilizing AVAPS AE to provide a targeted tidal volume. It is therefore required    IntraVENous Continuous PRN Jag Dodd APRN - CNP        sodium chloride flush 0.9 % injection 5-40 mL  5-40 mL IntraVENous 2 times per day Jag Dodd APRN - CNP   10 mL at 10/22/24 0910    sodium chloride flush 0.9 % injection 5-40 mL  5-40 mL IntraVENous PRN aJg Dodd APRN - CNP   10 mL at 10/20/24 1421    0.9 % sodium chloride infusion   IntraVENous PRN Jag Dodd APRN - CNP        potassium chloride (KLOR-CON M) extended release tablet 40 mEq  40 mEq Oral PRN Jag Dodd APRN - CNP        Or    potassium bicarb-citric acid (EFFER-K) effervescent tablet 40 mEq  40 mEq Oral PRN Jag Dodd APRN - CNP        Or    potassium chloride 10 mEq/100 mL IVPB (Peripheral Line)  10 mEq IntraVENous PRN Jag Dodd APRN - CNP        magnesium sulfate 2000 mg in 50 mL IVPB premix  2,000 mg IntraVENous PRN Jag Dodd APRN - CNP        ondansetron (ZOFRAN-ODT) disintegrating tablet 4 mg  4 mg Oral Q8H PRN Jag Dodd APRN - CNP        Or    ondansetron (ZOFRAN) injection 4 mg  4 mg IntraVENous Q6H PRN Jag Dodd APRN - CNP        acetaminophen (TYLENOL) tablet 650 mg  650 mg Oral Q6H PRN Jag Dodd APRN - CNP   650 mg at 10/19/24 1706    Or    acetaminophen (TYLENOL) suppository 650 mg  650 mg Rectal Q6H PRN Jag Dodd APRN - CNP        polyethylene glycol (GLYCOLAX) packet 17 g  17 g Oral Daily PRN Jag Dodd APRN - CNP        aluminum & magnesium hydroxide-simethicone (MAALOX) 200-200-20 MG/5ML suspension 30 mL  30 mL Oral Q6H PRN Jag oDdd APRN - CNP        insulin lispro (HUMALOG,ADMELOG) injection vial 0-8 Units  0-8 Units SubCUTAneous 4x Daily AC & HS Jag Dodd APRN - CNP        insulin glargine (LANTUS) injection vial 14 Units  14 Units SubCUTAneous Nightly Jag Dodd APRN - CNP   14 Units at 10/21/24 3613    sodium chloride 0.9 % bolus 1,000 mL  1,000 mL IntraVENous Once Tamiko Michel MD

## 2024-10-22 NOTE — CARE COORDINATION
Reviewed chart, sleep study completed last evening. PFT's remain pending. [facility] following for NIV, currently no diagnosis to support under Medicare,messaged pulm to notify. [facility] is following if pt can go home. [facility] has accepted and has a bed available today. Envelope, ambullete form, and completed PASRR in soft chart. Per Kailee at [facility] if enough notice, they can transport. Updated wife in room, that we are waiting for PFT's, and that a CPAP is ordered and [facility] will be following. Updated [facility], that pt is discharging to [facility], [facility] to follow at [facility].Aicha Garcia, MSW, LSW

## 2024-10-22 NOTE — PROGRESS NOTES
Jah Palomo M.D.,[facility]  Dandre Ceja D.O., F.BENI., [facility]  Trent Live M.D.  Dawn De La Torre M.D.   Alex Snowden D.O.  Enrique Lucas M.D.         Daily Pulmonary Progress Note    Patient:  Aubrey Up 83 y.o. male MRN: [id number]            Synopsis     We are following patient for hypercapnic respiratory failure    \"CC\" confusion    Code status: full code      Subjective      Patient was seen and examined lying in bed on 2 L nasal cannula.  Wife is present at the bedside.  Patient is much more alert today.  Sleep study was completed showing severe sleep apnea with an AHI of 85.2.  Lungs are clear on exam.    Review of Systems:  Constitutional: Denies fever, weight loss, night sweats, and fatigue  Skin: Denies pigmentation, dark lesions, and rashes   HEENT: Denies hearing loss, tinnitus, ear drainage, epistaxis, sore throat, and hoarseness.  Cardiovascular: Denies palpitations, chest pain, and chest pressure.  Respiratory: Denies cough, dyspnea at rest, hemoptysis, apnea, and choking.  Gastrointestinal: Denies nausea, vomiting, poor appetite, diarrhea, heartburn or reflux  Genitourinary: Denies dysuria, frequency, urgency or hematuria  Musculoskeletal: Denies myalgias, muscle weakness, and bone pain  Neurological: Denies dizziness, vertigo, headache, and focal weakness  Psychological: Denies anxiety and depression  Endocrine: Denies heat intolerance and cold intolerance  Hematopoietic/Lymphatic: Denies bleeding problems and blood transfusions    24-hour events:  4-channel bedside sleep study completed    Objective   OBJECTIVE:   BP (!) 178/77   Pulse 67   Temp 97.5 °F (36.4 °C) (Temporal)   Resp 18   Ht 1.727 m (5' 8\")   Wt 81.6 kg (180 lb)   SpO2 98%   BMI 27.37 kg/m²   SpO2 Readings from Last 1 Encounters:   10/22/24 98%        I/O:  No intake or output data in the 24 hours ending 10/22/24 7044            IPAP: 16 cmH20  CPAP/EPAP: 8 cmH2O     CURRENT MEDS :  Scheduled Meds:      Base Excess -3.0 - 3.0 mmol/L -4.5 (L)  -6.0 (L) -3.0 -2.5   -5.1 (L)   O2 Sat 92.0 - 98.5 % 98.8 (H)  98.4 97.9 98.7 (H)   98.6 (H)   THB,THB 11.5 - 16.5 g/dL 9.4 (L)  9.8 (L) 9.1 (L) 9.5 (L)   9.1 (L)   O2Hb 94.0 - 97.0 % 97.6 (H)  97.6 (H) 97.0 98.0 (H)   97.9 (H)   Carboxy-Hgb 0.0 - 1.5 % 0.6  0.3 0.3 0.2   0.1   METHB,METHB 0.0 - 1.5 % 0.6  0.5 0.6 0.5   0.6   HHb 0.0 - 5.0 % 1.2  1.6 2.1 1.3   1.4   AaDO2 mmHg    116.8 99.8   117.7   RI(T)     1.06 0.76   0.92   FIO2 %    40.0 40.0   40.0   PO2/FIO2 mmHg/%    2.76 3.28   3.19   CARBON DIOXIDE 22 - 29 mmol/L  24    22 24    Pt Temp C 37.0  37.0 37.0 37.0   37.0   Critical(s) Notified  . No Critical Values  . No Critical Values . No Critical Values . No Critical Values   . 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  Repeat ABGs this morning show much improvement with a pH of 7.36, pCO2 34.8.  Can change NIV use to HS and PRN  ABGs yesterday stable with pH 7.43, pCO2 44.2, pO2 67.4, HCO3 29.1, SpO2 93.3 while on 2 L.  I suspect intermittent bouts of lethargy are more neurologically related as opposed to respiratory  Cardiology following for questionable NSTEMI  CXR 10/16 reviewed-pleural plaques

## 2024-10-22 NOTE — TELEPHONE ENCOUNTER
Received referral from JEWELL Vargas NP for C[facility], left message for patient to schedule appointment to see Dr. Wong.

## 2024-10-23 VITALS
SYSTOLIC BLOOD PRESSURE: 146 MMHG | TEMPERATURE: 97.4 F | WEIGHT: 180 LBS | BODY MASS INDEX: 27.28 KG/M2 | RESPIRATION RATE: 18 BRPM | DIASTOLIC BLOOD PRESSURE: 67 MMHG | HEART RATE: 64 BPM | OXYGEN SATURATION: 95 % | HEIGHT: 68 IN

## 2024-10-23 LAB
ANION GAP SERPL CALCULATED.3IONS-SCNC: 8 MMOL/L (ref 7–16)
BUN SERPL-MCNC: 17 MG/DL (ref 6–23)
CALCIUM SERPL-MCNC: 8.5 MG/DL (ref 8.6–10.2)
CHLORIDE SERPL-SCNC: 105 MMOL/L (ref 98–107)
CO2 SERPL-SCNC: 30 MMOL/L (ref 22–29)
CREAT SERPL-MCNC: 1.6 MG/DL (ref 0.7–1.2)
GFR, ESTIMATED: 44 ML/MIN/1.73M2
GLUCOSE BLD-MCNC: 96 MG/DL (ref 74–99)
GLUCOSE SERPL-MCNC: 89 MG/DL (ref 74–99)
POTASSIUM SERPL-SCNC: 3.5 MMOL/L (ref 3.5–5)
SODIUM SERPL-SCNC: 143 MMOL/L (ref 132–146)

## 2024-10-23 PROCEDURE — 6370000000 HC RX 637 (ALT 250 FOR IP): Performed by: STUDENT IN AN ORGANIZED HEALTH CARE EDUCATION/TRAINING PROGRAM

## 2024-10-23 PROCEDURE — 94660 CPAP INITIATION&MGMT: CPT

## 2024-10-23 PROCEDURE — 2700000000 HC OXYGEN THERAPY PER DAY

## 2024-10-23 PROCEDURE — 80048 BASIC METABOLIC PNL TOTAL CA: CPT

## 2024-10-23 PROCEDURE — 6370000000 HC RX 637 (ALT 250 FOR IP)

## 2024-10-23 PROCEDURE — 6360000002 HC RX W HCPCS: Performed by: STUDENT IN AN ORGANIZED HEALTH CARE EDUCATION/TRAINING PROGRAM

## 2024-10-23 PROCEDURE — 36415 COLL VENOUS BLD VENIPUNCTURE: CPT

## 2024-10-23 PROCEDURE — 2580000003 HC RX 258

## 2024-10-23 PROCEDURE — 82962 GLUCOSE BLOOD TEST: CPT

## 2024-10-23 RX ORDER — ATORVASTATIN CALCIUM 20 MG/1
40 TABLET, FILM COATED ORAL DAILY
Qty: 30 TABLET | Refills: 3 | Status: SHIPPED | OUTPATIENT
Start: 2024-10-23

## 2024-10-23 RX ORDER — CARVEDILOL 12.5 MG/1
12.5 TABLET ORAL 2 TIMES DAILY WITH MEALS
Qty: 60 TABLET | Refills: 3 | Status: SHIPPED | OUTPATIENT
Start: 2024-10-23

## 2024-10-23 RX ADMIN — ASPIRIN 81 MG: 81 TABLET, COATED ORAL at 08:45

## 2024-10-23 RX ADMIN — CARVEDILOL 12.5 MG: 6.25 TABLET, FILM COATED ORAL at 08:45

## 2024-10-23 RX ADMIN — BRIMONIDINE TARTRATE 1 DROP: 2 SOLUTION/ DROPS OPHTHALMIC at 08:45

## 2024-10-23 RX ADMIN — CLOPIDOGREL BISULFATE 75 MG: 75 TABLET ORAL at 08:45

## 2024-10-23 RX ADMIN — LISINOPRIL 10 MG: 10 TABLET ORAL at 08:46

## 2024-10-23 RX ADMIN — SODIUM CHLORIDE, PRESERVATIVE FREE 10 ML: 5 INJECTION INTRAVENOUS at 09:48

## 2024-10-23 RX ADMIN — PANTOPRAZOLE SODIUM 40 MG: 40 TABLET, DELAYED RELEASE ORAL at 08:46

## 2024-10-23 RX ADMIN — HEPARIN SODIUM 5000 UNITS: 5000 INJECTION INTRAVENOUS; SUBCUTANEOUS at 06:23

## 2024-10-23 RX ADMIN — ATORVASTATIN CALCIUM 40 MG: 40 TABLET, FILM COATED ORAL at 08:45

## 2024-10-23 NOTE — PLAN OF CARE
Problem: Chronic Conditions and Co-morbidities  Goal: Patient's chronic conditions and co-morbidity symptoms are monitored and maintained or improved  10/23/2024 0120 by Luna Locke, RN  Outcome: Progressing  10/22/2024 1154 by Jeff Bentley, RN  Outcome: Progressing     Problem: Discharge Planning  Goal: Discharge to home or other facility with appropriate resources  Outcome: Progressing     Problem: Skin/Tissue Integrity  Goal: Absence of new skin breakdown  Description: 1.  Monitor for areas of redness and/or skin breakdown  2.  Assess vascular access sites hourly  3.  Every 4-6 hours minimum:  Change oxygen saturation probe site  4.  Every 4-6 hours:  If on nasal continuous positive airway pressure, respiratory therapy assess nares and determine need for appliance change or resting period.  Outcome: Progressing     Problem: Safety - Adult  Goal: Free from fall injury  Outcome: Progressing

## 2024-10-23 NOTE — PROGRESS NOTES
Ratio    stenosis <180    No plaque      <2.0          Normal <180  <50% plaque    <2.0           <50% 180-230  >50% plaque  2.0 - 4.0    50-69% >230    >50% plaque      >4.0         >70% ** -180 cm/sec and ICA/CCA PSV Ratio = 2.0 is also consistent with 50-69% stenosis.     MRI BRAIN WO CONTRAST    Result Date: 10/17/2024  1. Subcentimeter acute to subacute stroke involving medial margin of right basal ganglia. 2. Chronic lacunar stroke is seen involving lateral margin of left thalamus. 3. Fluid is seen in bilateral mastoid air cells which may indicate mastoiditis.     Complete sleep study address    EKG: See Report  Echo: See Report      IMPRESSIONS:  Patient Active Problem List   Diagnosis Code    Essential hypertension I10    Type 2 diabetes mellitus ([facility]) E11.9    Neuropathy due to type 2 diabetes mellitus ([facility]) E11.40    Mixed hyperlipidemia E78.2    H/O asbestosis Z87.09    Lumbar and sacral osteoarthritis M47.817    History of paroxysmal supraventricular tachycardia Z86.79    Need for diphtheria-tetanus-pertussis (Tdap) vaccine Z23    Vertigo R42    Pulmonary nodules R91.8    Abnormal x-ray of pelvis R93.7    Unsteady gait when walking R26.81    Abnormal radionuclide bone scan R94.8    Hip pain, chronic, right M25.551, G89.29    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, G89.29    History of CVA (cerebrovascular accident) Z86.73    Muscle weakness (generalized) M62.81    Lumbar radiculopathy M54.16    Lumbar pain M54.50    High prostate specific antigen (PSA) R97.20    Neuropathy G62.9    Sprain of medial collateral ligament of right knee S83.411A    Acute pain of right knee M25.561    Disorder of liver K76.9    History of prostate cancer Z85.46    NSTEMI (non-ST elevated myocardial infarction) (HCC) I21.4    Acute coronary syndrome (HCC) I24.9    Acute confusional state F05    Carotid stenosis, asymptomatic, left I65.22    Hallucinations R44.3    Acute ischemic stroke (HCC) I63.9        RECOMMENDATIONS:  Apparently awaiting 1 final test before discharge but he notes that he feels quite well and wishes to cooperate with the healthcare effort.Hopefully will be discharged with home health care and further follow-up as an outpatient as he improves.Continue to closely monitor blood pressure and heart rhythm as well as electrolytes and make appropriate adjustments accordingly before discharge.    I have spent more than 25 minutes face to face with Aubrey Up and reviewing notes and laboratory data, with greater than 50% of this time instructing and counseling the patient and his wife face to face regarding my findings and recommendations and I have answered all questions as posed to me by Mr. Up and his family member who was present at his bedside.present at his bedside.    Santy Marie, DO FACP,FACC,[facility]      NOTE:  This report was transcribed using voice recognition software.  Every effort was made to ensure accuracy; however, inadvertent computerized transcription errors may be present

## 2024-10-23 NOTE — CARE COORDINATION
Reviewed chart, pt able to discharge, sleep study done, PFT's can be outpatient since unable to be scheduled inpatient. Pulm ok for discharge. [facility] providing transport 11am . Notified son in room. Envelope, ambullete form and completed PASRR in soft chart.Aicha Garcia, MSW, LSW

## 2024-10-23 NOTE — PROGRESS NOTES
Nurse to nurse called to Brandan harris Brogan all patient information given     Electronically signed by Rita Hernandez RN on 10/23/2024 at 10:37 AM

## 2024-10-23 NOTE — DISCHARGE INSTR - COC
Continuity of Care Form    Patient Name: Aubrey Up   :  1940  MRN:  [id number]    Admit date:  10/16/2024  Discharge date:  10/24/24      Code Status Order: Full Code   Advance Directives:   Advance Care Flowsheet Documentation             Admitting Physician:  Margaret Sahni MD  PCP: Dionne Singh DO    Discharging Nurse: Juanis Donovan  Discharging Hospital Unit/Room#: 7415/7415-A  Discharging Unit Phone Number: [phone]      Emergency Contact:   Extended Emergency Contact Information  Primary Emergency Contact: Rosa Isela Up  Address: [address] [place] [address]  Home Phone: [phone]  Relation: Spouse    Past Surgical History:  Past Surgical History:   Procedure Laterality Date    CERVICAL DISC SURGERY      CHOLECYSTECTOMY      KNEE ARTHROPLASTY      PROSTATE BIOPSY      UPPER GASTROINTESTINAL ENDOSCOPY N/A 2024    ESOPHAGOGASTRODUODENOSCOPY PERCUTANEOUS ENDOSCOPIC GASTROSTOMY TUBE PLACEMENT performed by Daryl Stokes MD at [facility] ENDOSCOPY       Immunization History:   Immunization History   Administered Date(s) Administered    COVID-19, PFIZER Bivalent, DO NOT Dilute, (age 12y+), IM, 30 mcg/0.3 mL 09/15/2022    COVID-19, PFIZER PURPLE top, DILUTE for use, (age 12 y+), 30mcg/0.3mL 2021, 2021, 2021    DTaP, DAPTACEL, (age 6w-6y), IM, 0.5mL 2021    Influenza Vaccine, unspecified formulation 2017    Influenza Virus Vaccine 10/14/2008, 2009, 10/01/2010, 10/01/2011, 2013, 2015, 10/01/2016, 2018, 2019    Influenza, FLUAD, (age 65 y+), IM, Quadv, 0.5mL 10/18/2021, 2022    Influenza, FLUAD, (age 65 y+), IM, Trivalent PF, 0.5mL 2019    Influenza, FLUARIX, FLULAVAL, FLUZONE (age 6 mo+) and AFLURIA, (age 3 y+), Quadv PF, 0.5mL 2019    Influenza, FLUBLOK, (age 18 y+), IM, Trivalent PF, 0.5mL 10/29/2020, 10/18/2021    Influenza, FLUZONE High Dose (age 65 y+), IM, Quadv, 0.7mL  18   Ht 1.727 m (5' 8\")   Wt 81.6 kg (180 lb)   SpO2 95%   BMI 27.37 kg/m²     Last documented pain score (0-10 scale): Pain Level: 0  Last Weight:   Wt Readings from Last 1 Encounters:   10/18/24 81.6 kg (180 lb)     Mental Status:  oriented, alert, and coherent    IV Access:  - None    Nursing Mobility/ADLs:  Walking   Assisted  Transfer  Assisted  Bathing  Assisted  Dressing  Assisted  Toileting  Assisted  Feeding  Assisted  Med Admin  Assisted  Med Delivery   whole    Wound Care Documentation and Therapy:        Elimination:  Continence:   Bowel: Yes  Bladder: Yes  Urinary Catheter: None   Colostomy/Ileostomy/Ileal Conduit: No       Date of Last BM: 10/23/24      Intake/Output Summary (Last 24 hours) at 10/23/2024 1017  Last data filed at 10/23/2024 0737  Gross per 24 hour   Intake --   Output 310 ml   Net -310 ml     No intake/output data recorded.    Safety Concerns:     At Risk for Falls    Impairments/Disabilities:      None    Nutrition Therapy:  Current Nutrition Therapy:   - Oral Diet:  General    Routes of Feeding: Oral  Liquids: Thin Liquids  Daily Fluid Restriction: no  Last Modified Barium Swallow with Video (Video Swallowing Test): not done    Treatments at the Time of Hospital Discharge:   Respiratory Treatments: duonebs    Oxygen Therapy:  is on oxygen at 2 L/min per nasal cannula.  Ventilator:    - No ventilator support    Rehab Therapies: Physical Therapy and Occupational Therapy  Weight Bearing Status/Restrictions: No weight bearing restrictions  Other Medical Equipment (for information only, NOT a DME order):  walker  Other Treatments: None    Patient's personal belongings (please select all that are sent with patient):  Glasses, Hearing Aides bilateral, Dentures upper and lower    RN SIGNATURE:  Electronically signed by Juanis Donovan RN on 10/23/24 at 10:22 AM EDT    CASE MANAGEMENT/SOCIAL WORK SECTION    Inpatient Status Date: ***    Readmission Risk Assessment Score:  Readmission Risk

## 2024-10-23 NOTE — PROGRESS NOTES
Called PFT's lab to question when PFT would be completed. Was told they have a full day and they probably not get to patient today. Explained the PFT's were holding up discharge. Once again was told it likely would not happen today and that they can always be done as outpatient. Explained that PFT's were specifically requested inpatient by Dr Snowden in efforts to be able to qualify for home equipment. Social work notified. Social work reaching out to Jennifer Langston NP  to see if they can be done outpatient due to being delayed almost a week at this present time.          Electronically signed by Rita Hernandez RN on 10/23/2024 at 8:46 AM

## 2024-10-23 NOTE — DISCHARGE SUMMARY
[facility] Hospitalist Discharge Summary         Aubrey Up  MRN: [id number]  Account Number: [id number]  Admitted: 10/16/2024  Discharge Date: 10/23/24    PCP Handoff    Recommended Outpatient Testing  PFTs    Results Pending At Discharge  None        Clinical Summary    Patient admitted on 10/16/2024      Aubrey Up is a 83 y.o. male patient of Dionne Singh DO with history of CVA, gastroparesis, TIA, sleep apnea on CPAP, history of asbestosis, dysphagia s/p PEG tube placement on tube feeds presented to [facility] on 10/16/2024 from [facility] where he had initially presented with concern of confusion, auditory and visual hallucination.  Patient had a recent prolonged hospitalization for pneumonia and renal failure and was discharged home after rehab in early October.  While at outlying facility patient found to have elevated troponin and patient was transferred to [facility] main campus for NSTEMI and cardiology evaluation.  While in the hospital patient was eval by cardiology, as troponins remained flat no further intervention is warranted.  Patient had MRI brain that showed right basal ganglion stroke and patient was started on aspirin and Plavix.  While in the hospital patient had sleep study done and patient would continue to use AVAPS while in the nursing home and will be ordered for home as well.     Possible NSTEMI  Eval by cardiology, troponins have been flat and are downtrending  Heparin has been turned off  Continue aspirin, Plavix, statin  Was evaluated by cardiology, cleared for discharge.     Acute/subacute right basal ganglia stroke  MRI brain reviewed showing subcentimeter acute to subacute stroke in the right basal ganglia  Patient has no obvious focal deficits, speech is clear  Continue aspirin Plavix and statin  PT/OT.     MAMI-improved  Patient's baseline creatinine seems to be around 2, creatinine elevated 2.8 on  Stable    Disposition:  SNF     The patient's condition is stable.  At this time the patient is without objective evidence of an acute process requiring continuing hospitalization or inpatient management.  They are stable for discharge with outpatient follow-up.     I have spoken with the patient and discussed the results of the current hospitalization, in addition to providing specific details for the plan of care and counseling regarding the diagnosis and prognosis.  The plan has been discussed in detail and they are aware of the specific conditions for emergent return, as well as the importance of follow-up.  Their questions are answered at this time and they are agreeable with the plan for discharge    I reviewed discharge recommendations with the patient in person/family.      On day of discharge I saw Aubrey Up and Total time spent on day of encounter: 35 minutes on discharge.    Completed by: Edison Bloom MD on 10/23/24, 10:39 AM

## 2024-10-23 NOTE — PLAN OF CARE
Problem: Chronic Conditions and Co-morbidities  Goal: Patient's chronic conditions and co-morbidity symptoms are monitored and maintained or improved  10/23/2024 1037 by Rita Hernandez RN  Outcome: Completed     Problem: Discharge Planning  Goal: Discharge to home or other facility with appropriate resources  10/23/2024 1037 by Rita Hernandez RN  Outcome: Completed     Problem: Skin/Tissue Integrity  Goal: Absence of new skin breakdown  Description: 1.  Monitor for areas of redness and/or skin breakdown  2.  Assess vascular access sites hourly  3.  Every 4-6 hours minimum:  Change oxygen saturation probe site  4.  Every 4-6 hours:  If on nasal continuous positive airway pressure, respiratory therapy assess nares and determine need for appliance change or resting period.  10/23/2024 1037 by Rita Hernandez RN  Outcome: Completed     Problem: Safety - Adult  Goal: Free from fall injury  10/23/2024 1037 by Rita Hernandez RN  Outcome: Completed  10/23/2024 0120 by Luna Locke RN  Outcome: Progressing     Problem: ABCDS Injury Assessment  Goal: Absence of physical injury  10/23/2024 1037 by Rita Hernandez RN  Outcome: Completed     Problem: Pain  Goal: Verbalizes/displays adequate comfort level or baseline comfort level  Outcome: Completed

## 2024-10-23 NOTE — PROGRESS NOTES
Talked to RN over concerns about PFT and discharge planning. FULL day, will make effort to fit patient in schedule, no guarantee. Unsure of delay of pft since order of October 17h. PFT is closed on weekends and patient had confusion on the 17th.

## 2024-10-23 NOTE — PROGRESS NOTES
[facility]              [address] [place] [address]                           SLEEP CENTER REPORT      PATIENT NAME: MIKE LIGHT VISHNU            : 1940  MED REC NO: [id number]                        ROOM: [id number]  ACCOUNT NO: [id number]                       ADMIT DATE: 10/16/2024  PROVIDER: Jah Palomo MD      SLEEP STUDY    DATE OF STUDY:  10/21/2024    REFERRING PHYSICIAN:  YOLIE NICOLE      INDICATION FOR STUDY:  Witnessed apnea, excessive daytime sleepiness, wakes gasping, snore, fatigue.    CURRENT MEDICATIONS:  Insulin, pantoprazole, Rocephin, carvedilol, clopidogrel, heparin, atorvastatin.    INTERPRETATION:  This was a bedside multichannel sleep study utilizing a WatchPAT monitoring device.  Recording time was 10 hours 16 minutes and sleep time was 9 hours 34 minutes.  REM stage sleep comprised 12% of the total sleep time.  Review of the raw data indicates sufficient information to adequately interpret the study.    RESPIRATION SUMMARY:  The respiratory event index is 85.  The patient slept in the supine position 100% of the total sleep time.    OXYGEN SATURATION:  Average oxygen saturation was 87%.  Lowest saturation was 53%.  The patient spent 53% of the time with saturation less than 88%.    HEART RATE SUMMARY:  Average heart rate was 70 beats per minute.  There is suspected atrial fibrillation.  Premature beats occurred at approximately 8 per minute.      SNORING: The patient snored 6% of the total valid sleep time.    MISCELLANEOUS:  [place] Sleepiness Scale score is 11/24.  BMI is 27.4 pounds/inch square and neck circumference is 17 inches.    IMPRESSION:    1. Severe sleep apnea.  2. Nocturnal hypoxia.  3. Mild snoring.  4. Probable atrial fibrillation and premature ventricular contractions.    SUGGESTIONS:    1. Jennifer Langston CNP/Rober Hollis CNP, to discuss results of study with the patient.  2. The patient should return to [facility] for positive airway pressure titration once discharged.  3. An alternative to a titration study might be auto-CPAP.  4. Consider supplemental oxygen therapy.          VANE AVERY MD  Diplomat in Sleep Medicine, [facility]      D:  10/22/2024 16:03:41     T:  10/22/2024 21:47:47     ELEAZAR/AUDIE  Job #:  [id number]     Doc#:  [id number]    CC:   Rober Singh DO

## 2024-10-24 ENCOUNTER — TELEPHONE (OUTPATIENT)
Dept: VASCULAR SURGERY | Age: 84
End: 2024-10-24

## 2024-10-24 NOTE — TELEPHONE ENCOUNTER
Spoke with patient's wife. Scheduled appointment with Dr. Wong on Monday, 11-4-24 at 1:15 pm. Patient is staying at [facility], she will notify the nursing home.

## 2024-11-04 ENCOUNTER — OFFICE VISIT (OUTPATIENT)
Dept: VASCULAR SURGERY | Age: 84
End: 2024-11-04

## 2024-11-04 ENCOUNTER — TELEPHONE (OUTPATIENT)
Dept: VASCULAR SURGERY | Age: 84
End: 2024-11-04

## 2024-11-04 VITALS — WEIGHT: 188 LBS | BODY MASS INDEX: 28.59 KG/M2

## 2024-11-04 DIAGNOSIS — I65.23 CAROTID STENOSIS, BILATERAL: Primary | ICD-10-CM

## 2024-11-04 NOTE — PROGRESS NOTES
[x]/Yes []  Respiratory:             Cough:   No [x]/Yes []      Pleuritic chest pain:  No [x]/Yes []                        Dyspnea:   No [x]/Yes []      Wheezing:   No [x]/Yes []  Cardiovascular:             Angina:   No [x]/Yes []      Palpitations:   No [x]/Yes []          Claudication:    No [x]/Yes []      Leg swelling:   No [x]/Yes []  Gastrointestinal:             Nausea or vomiting:  No [x]/Yes []               Abdominal pain:  No [x]/Yes []                     Intestinal bleeding: No [x]/Yes []  Musculoskeletal:             Leg pain:   No [x]/Yes []      Back pain:   No [x]/Yes []                    Weakness:   No [x]/Yes []  Neurologic:             Numbness:   No [x]/Yes []      Paralysis:   No [x]/Yes []                       Headaches:   No [x]/Yes []  Hematologic, lymphatic:   Anemia:   No [x]/Yes []              Bleeding or bruising:  No [x]/Yes []              Fevers or chills: No [x]/Yes []  Endocrine:             Temp intolerance:   No [x]/Yes []                       Polydipsia, polyuria:  No [x]/Yes []  Skin:              Rash:    No [x]/Yes []      Ulcers:   No [x]/Yes []              Abnorm pigment: No [x]/Yes []  :              Frequency/urgency:  No [x]/Yes []      Hematuria:    No [x]/Yes []                      Incontinence:    No [x]/Yes []    PHYSICAL EXAM:  There were no vitals filed for this visit.  General Appearance: alert and oriented to person, place and time, well developed and well- nourished, in no acute distress  Skin: warm and dry, no rash or erythema  Head: normocephalic and atraumatic  Eyes: extraocular eye movements intact, conjunctivae normal  ENT: external ear and ear canal normal bilaterally, nose without deformity  Pulmonary/Chest: clear to auscultation bilaterally- no wheezes, rales or rhonchi, normal air movement, no respiratory distress  Cardiovascular: Regular rate and rhythm, no carotid bruit  Abdomen: soft, non-tender, non-distended, normal bowel sounds, no

## 2024-11-04 NOTE — TELEPHONE ENCOUNTER
Notified Brandan Hernandez of CTA at [facility] on 12-5-24 at 3:30 pm. [place] at 3:00 pm.  NPO after 12:30 pm.   She will draw BUN/Creatinine and fax to office. Also, left message on wife's voicemail. Follow up  to review results 12-9-24 at 10:00 am.

## 2024-11-06 ENCOUNTER — TELEPHONE (OUTPATIENT)
Dept: VASCULAR SURGERY | Age: 84
End: 2024-11-06

## 2024-11-06 NOTE — TELEPHONE ENCOUNTER
Spoke with Brandan at [facility].  Repeat BUN/Creatinine next week.  Increase fluid intake by 2-3 extra glasses a couple of days prior to having labs drawn.

## 2024-11-26 ENCOUNTER — TELEPHONE (OUTPATIENT)
Dept: VASCULAR SURGERY | Age: 84
End: 2024-11-26

## 2024-11-26 NOTE — TELEPHONE ENCOUNTER
Spoke with patient's wife. He was discharged from nursing home a couple of weeks ago.   Notified her that patient will need BMP prior to CTA.   She states [facility] would draw labs. They are coming on Monday, 12-2-24.  Spoke with Nia at Home care, ordered BMP with results to be faxed to our office.

## 2024-12-04 ENCOUNTER — TELEPHONE (OUTPATIENT)
Dept: VASCULAR SURGERY | Age: 84
End: 2024-12-04

## 2024-12-04 NOTE — TELEPHONE ENCOUNTER
Spoke with Romulo at [facility].  They will have BMP drawn today.  Mrs. Marques notified and instructed to have the pt increase water intake today and tomorrow prior to CTA.

## 2024-12-05 ENCOUNTER — TELEPHONE (OUTPATIENT)
Dept: VASCULAR SURGERY | Age: 84
End: 2024-12-05

## 2024-12-05 NOTE — TELEPHONE ENCOUNTER
Left message on voicemail regarding Dr. Franklin's recommendations prior to CTA head/neck. GFR trending down again.  Yesterday was 36. Will fax request to his office, also.    Notified patient's wife of appt with Dr. Franklin's NP, Oly Ann, on 12-11-24 at 10:45 am.  [address] [place] [address]

## 2024-12-20 ENCOUNTER — TELEPHONE (OUTPATIENT)
Dept: VASCULAR SURGERY | Age: 84
End: 2024-12-20

## 2025-01-02 ENCOUNTER — TELEPHONE (OUTPATIENT)
Dept: VASCULAR SURGERY | Age: 85
End: 2025-01-02

## 2025-01-02 NOTE — TELEPHONE ENCOUNTER
Spoke with Oly Ann, ROBERTA, office.   She does not recommend outpatient CTA with hydration.  If needed, would suggest admission for hydration.

## 2025-01-03 DIAGNOSIS — I65.23 CAROTID STENOSIS, BILATERAL: Primary | ICD-10-CM

## 2025-01-17 ENCOUNTER — TELEPHONE (OUTPATIENT)
Dept: VASCULAR SURGERY | Age: 85
End: 2025-01-17

## 2025-01-17 NOTE — TELEPHONE ENCOUNTER
Patient's wife rescheduled carotid ultrasound and visit with Dr. Wong on 1-20-25 due to the weather. Rescheduled to 2-10-25 at 10:30 to accommodate ultrasound and office visit on same day.

## 2025-01-28 ENCOUNTER — HOSPITAL ENCOUNTER (EMERGENCY)
Age: 85
Discharge: HOME | End: 2025-01-29
Attending: STUDENT IN AN ORGANIZED HEALTH CARE EDUCATION/TRAINING PROGRAM
Payer: MEDICARE

## 2025-01-28 ENCOUNTER — APPOINTMENT (OUTPATIENT)
Dept: GENERAL RADIOLOGY | Age: 85
End: 2025-01-28
Payer: MEDICARE

## 2025-01-28 VITALS
OXYGEN SATURATION: 98 % | HEART RATE: 71 BPM | HEIGHT: 68 IN | WEIGHT: 174 LBS | BODY MASS INDEX: 26.37 KG/M2 | DIASTOLIC BLOOD PRESSURE: 60 MMHG | SYSTOLIC BLOOD PRESSURE: 121 MMHG | RESPIRATION RATE: 18 BRPM | TEMPERATURE: 97.9 F

## 2025-01-28 DIAGNOSIS — T14.8XXA CHRONIC WOUND: Primary | ICD-10-CM

## 2025-01-28 DIAGNOSIS — I73.9 PERIPHERAL VASCULAR DISEASE (HCC): ICD-10-CM

## 2025-01-28 LAB
ALBUMIN SERPL-MCNC: 3.4 G/DL (ref 3.5–5.2)
ALP SERPL-CCNC: 154 U/L (ref 40–129)
ALT SERPL-CCNC: 31 U/L (ref 0–40)
ANION GAP SERPL CALCULATED.3IONS-SCNC: 13 MMOL/L (ref 7–16)
AST SERPL-CCNC: 31 U/L (ref 0–39)
BASOPHILS # BLD: 0.03 K/UL (ref 0–0.2)
BASOPHILS NFR BLD: 0 % (ref 0–2)
BILIRUB SERPL-MCNC: 0.9 MG/DL (ref 0–1.2)
BUN SERPL-MCNC: 50 MG/DL (ref 6–23)
CALCIUM SERPL-MCNC: 9.8 MG/DL (ref 8.6–10.2)
CHLORIDE SERPL-SCNC: 94 MMOL/L (ref 98–107)
CO2 SERPL-SCNC: 26 MMOL/L (ref 22–29)
CREAT SERPL-MCNC: 1.8 MG/DL (ref 0.7–1.2)
CRP SERPL HS-MCNC: 78 MG/L (ref 0–5)
EOSINOPHIL # BLD: 0.2 K/UL (ref 0.05–0.5)
EOSINOPHILS RELATIVE PERCENT: 2 % (ref 0–6)
ERYTHROCYTE [DISTWIDTH] IN BLOOD BY AUTOMATED COUNT: 16 % (ref 11.5–15)
ERYTHROCYTE [SEDIMENTATION RATE] IN BLOOD BY WESTERGREN METHOD: 126 MM/HR (ref 0–15)
GFR, ESTIMATED: 37 ML/MIN/1.73M2
GLUCOSE SERPL-MCNC: 145 MG/DL (ref 74–99)
HBA1C MFR BLD: 8.4 % (ref 4–5.6)
HCT VFR BLD AUTO: 31.9 % (ref 37–54)
HGB BLD-MCNC: 10.4 G/DL (ref 12.5–16.5)
IMM GRANULOCYTES # BLD AUTO: 0.05 K/UL (ref 0–0.58)
IMM GRANULOCYTES NFR BLD: 1 % (ref 0–5)
LYMPHOCYTES NFR BLD: 0.82 K/UL (ref 1.5–4)
LYMPHOCYTES RELATIVE PERCENT: 9 % (ref 20–42)
MCH RBC QN AUTO: 28.9 PG (ref 26–35)
MCHC RBC AUTO-ENTMCNC: 32.6 G/DL (ref 32–34.5)
MCV RBC AUTO: 88.6 FL (ref 80–99.9)
MONOCYTES NFR BLD: 0.8 K/UL (ref 0.1–0.95)
MONOCYTES NFR BLD: 9 % (ref 2–12)
NEUTROPHILS NFR BLD: 79 % (ref 43–80)
NEUTS SEG NFR BLD: 7.01 K/UL (ref 1.8–7.3)
PLATELET # BLD AUTO: 375 K/UL (ref 130–450)
PMV BLD AUTO: 9.8 FL (ref 7–12)
POTASSIUM SERPL-SCNC: 4.5 MMOL/L (ref 3.5–5)
PREALB SERPL-MCNC: 15 MG/DL (ref 20–40)
PROT SERPL-MCNC: 8 G/DL (ref 6.4–8.3)
RBC # BLD AUTO: 3.6 M/UL (ref 3.8–5.8)
SODIUM SERPL-SCNC: 133 MMOL/L (ref 132–146)
WBC OTHER # BLD: 8.9 K/UL (ref 4.5–11.5)

## 2025-01-28 PROCEDURE — 99284 EMERGENCY DEPT VISIT MOD MDM: CPT

## 2025-01-28 PROCEDURE — 73630 X-RAY EXAM OF FOOT: CPT

## 2025-01-28 PROCEDURE — 83036 HEMOGLOBIN GLYCOSYLATED A1C: CPT

## 2025-01-28 PROCEDURE — 86140 C-REACTIVE PROTEIN: CPT

## 2025-01-28 PROCEDURE — 85025 COMPLETE CBC W/AUTO DIFF WBC: CPT

## 2025-01-28 PROCEDURE — 85652 RBC SED RATE AUTOMATED: CPT

## 2025-01-28 PROCEDURE — 80053 COMPREHEN METABOLIC PANEL: CPT

## 2025-01-28 PROCEDURE — 84134 ASSAY OF PREALBUMIN: CPT

## 2025-01-28 ASSESSMENT — LIFESTYLE VARIABLES
HOW OFTEN DO YOU HAVE A DRINK CONTAINING ALCOHOL: NEVER
HOW MANY STANDARD DRINKS CONTAINING ALCOHOL DO YOU HAVE ON A TYPICAL DAY: PATIENT DOES NOT DRINK

## 2025-01-28 NOTE — CONSULTS
Vascular Surgery Consultation Note    Reason for Consult:  B/l chronic heel wounds     HPI:    This is a 84 y.o. male who is admitted to the hospital for wound check.  Patient has chronic bilateral heel wounds.  He is being followed up by podiatry outpatient for them.  He was last seen by podiatry on 1/10.  Back then, they recommended that he sees vascular surgery for peripheral vascular disease workup.  Currently, patient has no complaint.  He was sent in by his nurse due to worsening wounds.  Patient has a history of diabetes.  He has chronic neuropathy.  He can barely wiggle his toes.  He says that he puts pressure mainly on his heels when he gets up and walks.  Otherwise, he has no other issues.  He said he has never been worked up for PVD.     ROS: Negative if blank [], Positive if [x]  General Vascular   [] Fevers [] Claudication (Blocks)   [] Chills [] Rest Pain   [] Weight Loss [x] Tissue Loss   [] Chest Pain [] Clotting Disorder   [] SOB at rest [] Leg Swelling   [] SOB with exertion [] DVT/PE      [] Nausea    [] Vomiting [] Stroke/TIA   [] Abdominal Pain [] Focal weakness   [] Melena [] Slurred Speech   [] Hematochezia [] Vision Changes   [] Hematuria    [] Dysuria [] Hx of Central Catheters   [] Wears Glasses/Contacts [] Dialysis and If so date initiated   [] Blindness    []   [] Difficulty swallowing        Past Medical History:   Diagnosis Date    Acute respiratory failure     CVA (cerebral vascular accident) (HCC)     Diabetes mellitus ([facility])     Type 2    Dysphagia     Gastroparesis     H/O asbestosis     History of paroxysmal supraventricular tachycardia 06/11/2019    Neuropathy     Prostate CA ([facility])     Sleep apnea with use of continuous positive airway pressure (CPAP)     TIA (transient ischemic attack)         Past Surgical History:   Procedure Laterality Date    CERVICAL DISC SURGERY      CHOLECYSTECTOMY      KNEE ARTHROPLASTY      PROSTATE BIOPSY      UPPER GASTROINTESTINAL ENDOSCOPY N/A

## 2025-01-28 NOTE — ED PROVIDER NOTES
Temporal -- -- -- -- --       Oxygen Saturation Interpretation: Normal      Medical Decision Making  84-year-old male presenting for evaluation of chronic heel wounds.  Necrotic wounds to bilateral heels appreciated.  Vascular surgery consulted as patient was sent to see vascular surgery.  Podiatry consulted per vascular surgery.  Both recommend outpatient follow-up.  X-rays unremarkable.  Ultrasound ordered per vascular surgery.  Plan to discharge patient home with outpatient follow-up after patient receives ultrasound.    Amount and/or Complexity of Data Reviewed  External Data Reviewed: notes.     Details: Progress note by Dr. Wong, vascular surgery, 11/4/24-patient evaluated for carotid stenosis  Labs: ordered. Decision-making details documented in ED Course.     Details: All labs interpreted me  Radiology: ordered and independent interpretation performed.     Details: X-ray right foot-no soft tissue gas  X-ray left foot-no soft tissue gas  All imaging reviewed by me, final interpretation per radiologist          ------------------------------------------ PROGRESS NOTES ------------------------------------------  I have spoken with the patient and discussed today’s results, in addition to providing specific details for the plan of care and counseling regarding the diagnosis and prognosis.  Their questions are answered at this time and they are agreeable with the plan. I discussed at length with them reasons for immediate return here for re evaluation. They will followup with podiatry, vascular surgery.      --------------------------------- ADDITIONAL PROVIDER NOTES ---------------------------------  At this time the patient is without objective evidence of an acute process requiring hospitalization or inpatient management.  They have remained hemodynamically stable throughout their entire ED visit and are stable for discharge with outpatient follow-up.     The plan has been discussed in detail and they are

## 2025-01-29 NOTE — CONSULTS
Department of Podiatry   Consult Note        Reason for Consult:  B/L chronic heel wounds    CHIEF COMPLAINT:  B/L chronic heel wounds    HISTORY OF PRESENT ILLNESS:                The patient is a 84 y.o. male with significant past medical history of DM2,CVA presents to our podiatry service for bilateral chronic heel wounds by his home nurse. Patient is known to our service through Dr. Malin whom he follows with outpatient. Patient denies any pain to the heels or active drainage. Patient has been advised by Dr. Malin to follow up with vascular outpatient due to previous abnormal PALLAVI studies but has yet to get an appointment. Patient states he feels fine at this time. Patient denies any N/V/D/F/C/SOB/CP and has no other pedal complaints at this time.     Past Medical History:        Diagnosis Date    Acute respiratory failure     CVA (cerebral vascular accident) (HCC)     Diabetes mellitus (HCC)     Type 2    Dysphagia     Gastroparesis     H/O asbestosis     History of paroxysmal supraventricular tachycardia 06/11/2019    Neuropathy     Prostate CA (HCC)     Sleep apnea with use of continuous positive airway pressure (CPAP)     TIA (transient ischemic attack)        Past Surgical History:        Procedure Laterality Date    CERVICAL DISC SURGERY      CHOLECYSTECTOMY      KNEE ARTHROPLASTY      PROSTATE BIOPSY      UPPER GASTROINTESTINAL ENDOSCOPY N/A 8/6/2024    ESOPHAGOGASTRODUODENOSCOPY PERCUTANEOUS ENDOSCOPIC GASTROSTOMY TUBE PLACEMENT performed by Daryl Stokes MD at [facility] ENDOSCOPY       Medications Prior to Admission:    Not in a hospital admission.    Allergies:  Patient has no known allergies.    Social History:   TOBACCO:   reports that he has never smoked. He has never used smokeless tobacco.  ETOH:   reports that he does not currently use alcohol.  DRUGS:   Social History     Substance and Sexual Activity   Drug Use Never       Family History:       Problem Relation Age of Onset    Dementia Mother

## 2025-01-29 NOTE — DISCHARGE INSTRUCTIONS
Follow up with your podiatrist.  Please complete PALLAVI and follow up with Dr. Wong in the office.

## 2025-02-04 LAB
ALBUMIN SERPL-MCNC: 3.6 G/DL (ref 3.5–5.2)
ALP SERPL-CCNC: 135 U/L (ref 40–129)
ALT SERPL-CCNC: 21 U/L (ref 0–40)
ANION GAP SERPL CALCULATED.3IONS-SCNC: 11 MMOL/L (ref 7–16)
AST SERPL-CCNC: 24 U/L (ref 0–39)
BASOPHILS # BLD: 0.04 K/UL (ref 0–0.2)
BASOPHILS NFR BLD: 0 % (ref 0–2)
BILIRUB SERPL-MCNC: 0.6 MG/DL (ref 0–1.2)
BUN SERPL-MCNC: 65 MG/DL (ref 6–23)
CALCIUM SERPL-MCNC: 9.6 MG/DL (ref 8.6–10.2)
CHLORIDE SERPL-SCNC: 93 MMOL/L (ref 98–107)
CO2 SERPL-SCNC: 28 MMOL/L (ref 22–29)
CREAT SERPL-MCNC: 2 MG/DL (ref 0.7–1.2)
CRP SERPL HS-MCNC: 48 MG/L (ref 0–5)
EOSINOPHIL # BLD: 0.13 K/UL (ref 0.05–0.5)
EOSINOPHILS RELATIVE PERCENT: 2 % (ref 0–6)
ERYTHROCYTE [DISTWIDTH] IN BLOOD BY AUTOMATED COUNT: 16.2 % (ref 11.5–15)
GFR, ESTIMATED: 34 ML/MIN/1.73M2
GLUCOSE SERPL-MCNC: 131 MG/DL (ref 74–99)
HCT VFR BLD AUTO: 30.9 % (ref 37–54)
HGB BLD-MCNC: 9.9 G/DL (ref 12.5–16.5)
IMM GRANULOCYTES # BLD AUTO: 0.05 K/UL (ref 0–0.58)
IMM GRANULOCYTES NFR BLD: 1 % (ref 0–5)
LYMPHOCYTES NFR BLD: 1.07 K/UL (ref 1.5–4)
LYMPHOCYTES RELATIVE PERCENT: 12 % (ref 20–42)
MCH RBC QN AUTO: 28.6 PG (ref 26–35)
MCHC RBC AUTO-ENTMCNC: 32 G/DL (ref 32–34.5)
MCV RBC AUTO: 89.3 FL (ref 80–99.9)
MONOCYTES NFR BLD: 0.72 K/UL (ref 0.1–0.95)
MONOCYTES NFR BLD: 8 % (ref 2–12)
NEUTROPHILS NFR BLD: 77 % (ref 43–80)
NEUTS SEG NFR BLD: 6.91 K/UL (ref 1.8–7.3)
PLATELET # BLD AUTO: 412 K/UL (ref 130–450)
PMV BLD AUTO: 9.6 FL (ref 7–12)
POTASSIUM SERPL-SCNC: 4.4 MMOL/L (ref 3.5–5)
PROT SERPL-MCNC: 8 G/DL (ref 6.4–8.3)
RBC # BLD AUTO: 3.46 M/UL (ref 3.8–5.8)
SODIUM SERPL-SCNC: 132 MMOL/L (ref 132–146)
WBC OTHER # BLD: 8.9 K/UL (ref 4.5–11.5)

## 2025-02-05 LAB — ERYTHROCYTE [SEDIMENTATION RATE] IN BLOOD BY WESTERGREN METHOD: 126 MM/HR (ref 0–15)

## 2025-02-07 LAB
MICROORGANISM SPEC CULT: ABNORMAL
MICROORGANISM/AGENT SPEC: ABNORMAL
SPECIMEN DESCRIPTION: ABNORMAL

## 2025-02-09 LAB
MICROORGANISM SPEC CULT: ABNORMAL
SPECIMEN DESCRIPTION: ABNORMAL

## 2025-02-10 ENCOUNTER — OFFICE VISIT (OUTPATIENT)
Dept: VASCULAR SURGERY | Age: 85
End: 2025-02-10
Payer: MEDICARE

## 2025-02-10 VITALS — WEIGHT: 170 LBS | BODY MASS INDEX: 25.85 KG/M2

## 2025-02-10 DIAGNOSIS — I65.23 CAROTID STENOSIS, BILATERAL: Primary | ICD-10-CM

## 2025-02-10 DIAGNOSIS — I73.9 PAD (PERIPHERAL ARTERY DISEASE) (HCC): ICD-10-CM

## 2025-02-10 PROCEDURE — G8417 CALC BMI ABV UP PARAM F/U: HCPCS | Performed by: STUDENT IN AN ORGANIZED HEALTH CARE EDUCATION/TRAINING PROGRAM

## 2025-02-10 PROCEDURE — 1159F MED LIST DOCD IN RCRD: CPT | Performed by: STUDENT IN AN ORGANIZED HEALTH CARE EDUCATION/TRAINING PROGRAM

## 2025-02-10 PROCEDURE — G8427 DOCREV CUR MEDS BY ELIG CLIN: HCPCS | Performed by: STUDENT IN AN ORGANIZED HEALTH CARE EDUCATION/TRAINING PROGRAM

## 2025-02-10 PROCEDURE — 99214 OFFICE O/P EST MOD 30 MIN: CPT | Performed by: STUDENT IN AN ORGANIZED HEALTH CARE EDUCATION/TRAINING PROGRAM

## 2025-02-10 PROCEDURE — 1123F ACP DISCUSS/DSCN MKR DOCD: CPT | Performed by: STUDENT IN AN ORGANIZED HEALTH CARE EDUCATION/TRAINING PROGRAM

## 2025-02-10 PROCEDURE — 1036F TOBACCO NON-USER: CPT | Performed by: STUDENT IN AN ORGANIZED HEALTH CARE EDUCATION/TRAINING PROGRAM

## 2025-02-10 NOTE — PROGRESS NOTES
HISTORY:    The patient is a 59 year old male who comes in for follow-up on his chronic medical issues/complete physical.  Please see below for more details.    Patient Active Problem List   Diagnosis   • Generalized OA   • Dermatitis   • Allergic rhinitis       Current Outpatient Medications   Medication Sig   • acyclovir (ZOVIRAX) 800 MG tablet Take 800 mg by mouth.   • omeprazole (PrilOSEC) 20 MG capsule Take 20 mg by mouth daily as needed.   • Multiple Vitamin (MULTI-VITAMIN DAILY PO) Take by mouth daily. Equate   • VITAMIN D PO Take by mouth daily.   • clobetasol (TEMOVATE) 0.05 % cream Apply twice daily to trunk and extremities as needed for rash   • hydroCORTisone (CORTIZONE) 2.5 % cream Apply twice daily to face and neck as needed for rash     No current facility-administered medications for this visit.       Allergies as of 02/01/2023   • (No Known Allergies)       Past Medical History:   Diagnosis Date   • Colon polyps    • Hyperlipidemia    • Inguinal hernia     bilateral   • Leukemia (CMS/HCC)        Past Surgical History:   Procedure Laterality Date   • Colonoscopy  08/20/2010   • Hernia repair     • Inner ear surgery  2003    to have ear bone rebuilt         REVIEW OF SYSTEMS:  Constitutional: Denies unexplained weight changes, generalized fatigue, fever, chills. Occasional night sweats.  Eyes:  Denies acute visual changes. Last eye exam 1-2 years ago. Glasses reading.  HENT: Denies any headaches, dizziness or lightheadedness. Denies ear aches, hearing loss, nasal congestion, sore throat, trouble swallowing. Little tinnitus.  Cardiovascular:  Denies chest pain, palpitations, irregular heart beats. Denies edema  Respiratory: Denies wheezing, cough, or chest congestion. Little SOB with exertion.  Gastrointestinal:  Denies abdominal pain, nausea, vomiting, diarrhea, constipation, melena, changes in bowel habits.  Genitourinary:  Denies dysuria, hematuria, frequency, urinary incontinence, hesitancy, weak  stream, urgency. Nocturia times 1.  Musculoskeletal:  Normal aches and pains - joints. Little bit of trouble with his shoulder - right since fell off a ladder.  Neurologic:  Denies focal weakness, numbness, tingling or sensory changes except arms occasionally will get a little tingling.  Psychiatric:  Denies changes in mood, anxiety, insomnia, concerns about excess alcohol consumption or illicit drug use. Denies any memory issues.  Integument:  Denies rash, changes in moles, or other skin changes.  Endocrine:  Denies polyuria or polydipsia, denies temperature intolerance.  Lymphatic:  Denies swollen glands.  All other review of systems is negative.  Exercise: walking  Diet: good lately        PHYSICAL EXAMINATION:  Vitals: Blood pressure (!) 162/110, pulse 80, resp. rate 16, height 5' 8\" (1.727 m), weight 90 kg (198 lb 6.4 oz). Body mass index is 30.17 kg/m².  Constitutional:  Patient is alert, in no acute distress, well-nourished, well-hydrated. Patient is alert and oriented times 3.  HEENT:  Head is normocephalic without any acute abnormalities.  Eyes:  Pupils are equal and reactive, fundi benign.  Conjunctivae are non-injected.  Ears:  External auditory canals are patent, tympanic membranes are intact without erythema or drainage.  Masked.  Neck:  Neck is symmetrical and supple. Thyroid is normal to palpation.  No masses palpated.  Minimal cervical adenopathy.  No JVD (jugular venous distension) or bruits. No supraclavicular nodes are palpated.  Heart:  Rate and rhythm are regular.  No murmur, gallops or rubs were auscultated.  Lungs:  Lungs show good aeration, good respiratory effort and are clear to auscultation bilaterally.  No rales, rhonchi or wheezes are heard.  Abdomen:  Abdomen has positive bowel sounds.  Abdomen is soft, nontender.  No masses are appreciated.  No hepatosplenomegaly is present.  Extremities:  Extremities show good strength and range of motion.  No structural deformities are noted.  No  peripheral edema is present.  Peripheral pulses are intact and equal bilaterally.  Skin:  Skin is warm and dry.  No rashes are visualized.  No suspicious moles are seen.  Neurologic:  Cranial nerves are grossly intact.  No motor or sensory deficits are appreciated.  Gait is normal.  Memory is good and mood and affect are appropriate.        ASSESSMENT/PLAN:  Routine physical.      Lymphoma, small lymphocytic/chronic lymphocytic leukemia.  Patient does follow  [facility]. Recently finished treatment with monoclonal therapy (Monday) - Gazyva. Dx 11/2021.    Chronic viral hepatitis-B without delta agent.  Patient has seen Infectious Disease. He was told him immune system was working and he is not longer following with her.    Generalized osteoarthritis.     GERD. Is on omeprazole 20 mg and TUMS prn.     Dyslipidemia.  Last lipids done April 2021 showed an HDL of 40, LDL 98 and triglycerides of 250.      Vitamin-D deficiency.  Vitamin-D level April 2021 was 40.9.     Colon cancer screening: last colonoscopy over 10 years ago - is overdue. Order placed in past and today.     Tobacco dependency.  Patient quit 11/2021.     Eczema and skin changes from CLL.  Patient has seen Dermatology for this.    Prostate cancer screening:  PSA April 2021 was normal at 3.61.    Hypertension. Today /110.  Recheck was 144/2.  We can start him on Benicar 20 mg daily.  Patient should monitor his blood pressures and update us with readings.   Transportation     Lack of Transportation (Medical): Patient unable to answer     Lack of Transportation (Non-Medical): Patient unable to answer   Physical Activity: Sufficiently Active (4/19/2023)    Exercise Vital Sign     Days of Exercise per Week: 7 days     Minutes of Exercise per Session: 70 min   Stress: No Stress Concern Present (8/19/2024)    Received from [facility]    [place] [employer] - Occupational Stress Questionnaire     Feeling of Stress : Only a little   Social Connections: Socially Integrated (8/1/2024)    Received from [facility], [facility]    Social Connection and Isolation Panel [NHANES]     Frequency of Communication with Friends and Family: Three times a week     Frequency of Social Gatherings with Friends and Family: Three times a week     Attends Church Services: 1 to 4 times per year     Active Member of Clubs or Organizations: Yes     Attends Club or Organization Meetings: 1 to 4 times per year     Marital Status:    Intimate Partner Violence: At Risk (7/31/2024)    Received from [facility], [facility]    Domestic Abuse Assessment     Do you feel safe in your relationships at home?: Yes     Physical Abuse: Denies     [facility] Domestic Abuse - Type of Abuse: Not on file     [facility] Domestic Abuse - Time Frame: Not on file     HRSN Domestic Abuse - Signs and Symptoms: Not on file     Verbal Abuse: Denies     Possible abuse reported to:: Other (Comment)   Housing Stability: Patient Unable To Answer (10/16/2024)    Housing Stability Vital Sign     Unable to Pay for Housing in the Last Year: Patient unable to answer     Number of Times Moved in the Last Year: 0     Homeless in the Last Year: Patient unable to answer        Family History   Problem Relation Age of Onset    Dementia Mother        REVIEW OF SYSTEMS (New symptoms):    Eyes:      Blurred vision:  No [x]/Yes []               Diplopia:   No [x]/Yes []               Vision loss:       No

## 2025-02-13 LAB
ALBUMIN SERPL-MCNC: 3.4 G/DL (ref 3.5–5.2)
ALP SERPL-CCNC: 131 U/L (ref 40–129)
ALT SERPL-CCNC: 24 U/L (ref 0–40)
ANION GAP SERPL CALCULATED.3IONS-SCNC: 8 MMOL/L (ref 7–16)
AST SERPL-CCNC: 27 U/L (ref 0–39)
BASOPHILS # BLD: 0.05 K/UL (ref 0–0.2)
BASOPHILS NFR BLD: 1 % (ref 0–2)
BILIRUB SERPL-MCNC: 0.6 MG/DL (ref 0–1.2)
BUN SERPL-MCNC: 52 MG/DL (ref 6–23)
CALCIUM SERPL-MCNC: 9.4 MG/DL (ref 8.6–10.2)
CHLORIDE SERPL-SCNC: 97 MMOL/L (ref 98–107)
CO2 SERPL-SCNC: 29 MMOL/L (ref 22–29)
CREAT SERPL-MCNC: 1.8 MG/DL (ref 0.7–1.2)
CRP SERPL HS-MCNC: 44 MG/L (ref 0–5)
EOSINOPHIL # BLD: 0.1 K/UL (ref 0.05–0.5)
EOSINOPHILS RELATIVE PERCENT: 1 % (ref 0–6)
ERYTHROCYTE [DISTWIDTH] IN BLOOD BY AUTOMATED COUNT: 16.1 % (ref 11.5–15)
ERYTHROCYTE [SEDIMENTATION RATE] IN BLOOD BY WESTERGREN METHOD: 113 MM/HR (ref 0–15)
GFR, ESTIMATED: 36 ML/MIN/1.73M2
GLUCOSE SERPL-MCNC: 188 MG/DL (ref 74–99)
HCT VFR BLD AUTO: 29.4 % (ref 37–54)
HGB BLD-MCNC: 9.3 G/DL (ref 12.5–16.5)
IMM GRANULOCYTES # BLD AUTO: 0.05 K/UL (ref 0–0.58)
IMM GRANULOCYTES NFR BLD: 1 % (ref 0–5)
LYMPHOCYTES NFR BLD: 0.99 K/UL (ref 1.5–4)
LYMPHOCYTES RELATIVE PERCENT: 10 % (ref 20–42)
MCH RBC QN AUTO: 28.9 PG (ref 26–35)
MCHC RBC AUTO-ENTMCNC: 31.6 G/DL (ref 32–34.5)
MCV RBC AUTO: 91.3 FL (ref 80–99.9)
MONOCYTES NFR BLD: 0.7 K/UL (ref 0.1–0.95)
MONOCYTES NFR BLD: 7 % (ref 2–12)
NEUTROPHILS NFR BLD: 80 % (ref 43–80)
NEUTS SEG NFR BLD: 7.61 K/UL (ref 1.8–7.3)
PLATELET # BLD AUTO: 418 K/UL (ref 130–450)
PMV BLD AUTO: 9.9 FL (ref 7–12)
POTASSIUM SERPL-SCNC: 5.3 MMOL/L (ref 3.5–5)
PROT SERPL-MCNC: 7.3 G/DL (ref 6.4–8.3)
RBC # BLD AUTO: 3.22 M/UL (ref 3.8–5.8)
SODIUM SERPL-SCNC: 134 MMOL/L (ref 132–146)
WBC OTHER # BLD: 9.5 K/UL (ref 4.5–11.5)

## 2025-02-16 LAB
MICROORGANISM SPEC CULT: ABNORMAL
MICROORGANISM SPEC CULT: NORMAL
MICROORGANISM/AGENT SPEC: ABNORMAL
SERVICE CMNT-IMP: ABNORMAL
SPECIMEN DESCRIPTION: ABNORMAL
SPECIMEN DESCRIPTION: NORMAL

## 2025-02-17 LAB
MICROORGANISM SPEC CULT: NORMAL
SPECIMEN DESCRIPTION: NORMAL

## 2025-02-20 LAB
ALBUMIN SERPL-MCNC: 3.6 G/DL (ref 3.5–5.2)
ALP SERPL-CCNC: 119 U/L (ref 40–129)
ALT SERPL-CCNC: 21 U/L (ref 0–40)
ANION GAP SERPL CALCULATED.3IONS-SCNC: 11 MMOL/L (ref 7–16)
AST SERPL-CCNC: 30 U/L (ref 0–39)
BASOPHILS # BLD: 0.04 K/UL (ref 0–0.2)
BASOPHILS NFR BLD: 1 % (ref 0–2)
BILIRUB SERPL-MCNC: 0.7 MG/DL (ref 0–1.2)
BUN SERPL-MCNC: 58 MG/DL (ref 6–23)
CALCIUM SERPL-MCNC: 9.5 MG/DL (ref 8.6–10.2)
CHLORIDE SERPL-SCNC: 95 MMOL/L (ref 98–107)
CO2 SERPL-SCNC: 29 MMOL/L (ref 22–29)
CREAT SERPL-MCNC: 2 MG/DL (ref 0.7–1.2)
CRP SERPL HS-MCNC: 19 MG/L (ref 0–5)
EOSINOPHIL # BLD: 0.12 K/UL (ref 0.05–0.5)
EOSINOPHILS RELATIVE PERCENT: 1 % (ref 0–6)
ERYTHROCYTE [DISTWIDTH] IN BLOOD BY AUTOMATED COUNT: 16 % (ref 11.5–15)
GFR, ESTIMATED: 32 ML/MIN/1.73M2
GLUCOSE SERPL-MCNC: 162 MG/DL (ref 74–99)
HCT VFR BLD AUTO: 29.7 % (ref 37–54)
HGB BLD-MCNC: 9.5 G/DL (ref 12.5–16.5)
IMM GRANULOCYTES # BLD AUTO: 0.03 K/UL (ref 0–0.58)
IMM GRANULOCYTES NFR BLD: 0 % (ref 0–5)
LYMPHOCYTES NFR BLD: 1.03 K/UL (ref 1.5–4)
LYMPHOCYTES RELATIVE PERCENT: 12 % (ref 20–42)
MCH RBC QN AUTO: 28.7 PG (ref 26–35)
MCHC RBC AUTO-ENTMCNC: 32 G/DL (ref 32–34.5)
MCV RBC AUTO: 89.7 FL (ref 80–99.9)
MONOCYTES NFR BLD: 0.56 K/UL (ref 0.1–0.95)
MONOCYTES NFR BLD: 6 % (ref 2–12)
NEUTROPHILS NFR BLD: 80 % (ref 43–80)
NEUTS SEG NFR BLD: 7.02 K/UL (ref 1.8–7.3)
PLATELET # BLD AUTO: 384 K/UL (ref 130–450)
PMV BLD AUTO: 10 FL (ref 7–12)
POTASSIUM SERPL-SCNC: 4.7 MMOL/L (ref 3.5–5)
PROT SERPL-MCNC: 7.7 G/DL (ref 6.4–8.3)
RBC # BLD AUTO: 3.31 M/UL (ref 3.8–5.8)
SODIUM SERPL-SCNC: 135 MMOL/L (ref 132–146)
WBC OTHER # BLD: 8.8 K/UL (ref 4.5–11.5)

## 2025-02-21 LAB — ERYTHROCYTE [SEDIMENTATION RATE] IN BLOOD BY WESTERGREN METHOD: 93 MM/HR (ref 0–15)

## 2025-02-23 LAB
MICROORGANISM SPEC CULT: ABNORMAL
MICROORGANISM SPEC CULT: NORMAL
MICROORGANISM SPEC CULT: NORMAL
MICROORGANISM/AGENT SPEC: ABNORMAL
SPECIMEN DESCRIPTION: ABNORMAL
SPECIMEN DESCRIPTION: ABNORMAL
SPECIMEN DESCRIPTION: NORMAL
SPECIMEN DESCRIPTION: NORMAL

## 2025-02-24 LAB
MICROORGANISM SPEC CULT: ABNORMAL
MICROORGANISM SPEC CULT: NORMAL
MICROORGANISM SPEC CULT: NORMAL
MICROORGANISM/AGENT SPEC: ABNORMAL
SPECIMEN DESCRIPTION: ABNORMAL
SPECIMEN DESCRIPTION: NORMAL
SPECIMEN DESCRIPTION: NORMAL

## 2025-02-25 LAB
ALBUMIN SERPL-MCNC: 3.3 G/DL (ref 3.5–5.2)
ALP SERPL-CCNC: 108 U/L (ref 40–129)
ALT SERPL-CCNC: 13 U/L (ref 0–40)
ANION GAP SERPL CALCULATED.3IONS-SCNC: 13 MMOL/L (ref 7–16)
AST SERPL-CCNC: 20 U/L (ref 0–39)
BASOPHILS # BLD: 0.03 K/UL (ref 0–0.2)
BASOPHILS NFR BLD: 0 % (ref 0–2)
BILIRUB SERPL-MCNC: 0.7 MG/DL (ref 0–1.2)
BUN SERPL-MCNC: 58 MG/DL (ref 6–23)
CALCIUM SERPL-MCNC: 9.3 MG/DL (ref 8.6–10.2)
CHLORIDE SERPL-SCNC: 92 MMOL/L (ref 98–107)
CO2 SERPL-SCNC: 26 MMOL/L (ref 22–29)
CREAT SERPL-MCNC: 2.2 MG/DL (ref 0.7–1.2)
CRP SERPL HS-MCNC: 69 MG/L (ref 0–5)
EOSINOPHIL # BLD: 0.07 K/UL (ref 0.05–0.5)
EOSINOPHILS RELATIVE PERCENT: 1 % (ref 0–6)
ERYTHROCYTE [DISTWIDTH] IN BLOOD BY AUTOMATED COUNT: 16.7 % (ref 11.5–15)
ERYTHROCYTE [SEDIMENTATION RATE] IN BLOOD BY WESTERGREN METHOD: 100 MM/HR (ref 0–15)
GFR, ESTIMATED: 29 ML/MIN/1.73M2
GLUCOSE SERPL-MCNC: 154 MG/DL (ref 74–99)
HCT VFR BLD AUTO: 27.3 % (ref 37–54)
HGB BLD-MCNC: 8.8 G/DL (ref 12.5–16.5)
IMM GRANULOCYTES # BLD AUTO: 0.03 K/UL (ref 0–0.58)
IMM GRANULOCYTES NFR BLD: 0 % (ref 0–5)
LYMPHOCYTES NFR BLD: 1.09 K/UL (ref 1.5–4)
LYMPHOCYTES RELATIVE PERCENT: 13 % (ref 20–42)
MCH RBC QN AUTO: 29.4 PG (ref 26–35)
MCHC RBC AUTO-ENTMCNC: 32.2 G/DL (ref 32–34.5)
MCV RBC AUTO: 91.3 FL (ref 80–99.9)
MONOCYTES NFR BLD: 0.88 K/UL (ref 0.1–0.95)
MONOCYTES NFR BLD: 11 % (ref 2–12)
NEUTROPHILS NFR BLD: 75 % (ref 43–80)
NEUTS SEG NFR BLD: 6.32 K/UL (ref 1.8–7.3)
PLATELET CONFIRMATION: NORMAL
PLATELET, FLUORESCENCE: 281 K/UL (ref 130–450)
PMV BLD AUTO: 10.6 FL (ref 7–12)
POTASSIUM SERPL-SCNC: 4.4 MMOL/L (ref 3.5–5)
PROT SERPL-MCNC: 7.3 G/DL (ref 6.4–8.3)
RBC # BLD AUTO: 2.99 M/UL (ref 3.8–5.8)
SODIUM SERPL-SCNC: 131 MMOL/L (ref 132–146)
WBC OTHER # BLD: 8.4 K/UL (ref 4.5–11.5)

## 2025-02-28 LAB
MICROORGANISM SPEC CULT: NORMAL
SPECIMEN DESCRIPTION: NORMAL

## 2025-03-04 LAB
ALBUMIN SERPL-MCNC: 3.3 G/DL (ref 3.5–5.2)
ALP SERPL-CCNC: 107 U/L (ref 40–129)
ALT SERPL-CCNC: 31 U/L (ref 0–40)
ANION GAP SERPL CALCULATED.3IONS-SCNC: 11 MMOL/L (ref 7–16)
AST SERPL-CCNC: 38 U/L (ref 0–39)
BASOPHILS # BLD: 0.03 K/UL (ref 0–0.2)
BASOPHILS NFR BLD: 0 % (ref 0–2)
BILIRUB SERPL-MCNC: 0.4 MG/DL (ref 0–1.2)
BUN SERPL-MCNC: 52 MG/DL (ref 6–23)
CALCIUM SERPL-MCNC: 9.2 MG/DL (ref 8.6–10.2)
CHLORIDE SERPL-SCNC: 98 MMOL/L (ref 98–107)
CO2 SERPL-SCNC: 26 MMOL/L (ref 22–29)
CREAT SERPL-MCNC: 1.9 MG/DL (ref 0.7–1.2)
CRP SERPL HS-MCNC: 41 MG/L (ref 0–5)
EOSINOPHIL # BLD: 0.21 K/UL (ref 0.05–0.5)
EOSINOPHILS RELATIVE PERCENT: 3 % (ref 0–6)
ERYTHROCYTE [DISTWIDTH] IN BLOOD BY AUTOMATED COUNT: 17 % (ref 11.5–15)
ERYTHROCYTE [SEDIMENTATION RATE] IN BLOOD BY WESTERGREN METHOD: 24 MM/HR (ref 0–15)
GFR, ESTIMATED: 34 ML/MIN/1.73M2
GLUCOSE SERPL-MCNC: 200 MG/DL (ref 74–99)
HCT VFR BLD AUTO: 25.3 % (ref 37–54)
HGB BLD-MCNC: 8.1 G/DL (ref 12.5–16.5)
IMM GRANULOCYTES # BLD AUTO: 0.08 K/UL (ref 0–0.58)
IMM GRANULOCYTES NFR BLD: 1 % (ref 0–5)
LYMPHOCYTES NFR BLD: 1.06 K/UL (ref 1.5–4)
LYMPHOCYTES RELATIVE PERCENT: 13 % (ref 20–42)
MCH RBC QN AUTO: 28.7 PG (ref 26–35)
MCHC RBC AUTO-ENTMCNC: 32 G/DL (ref 32–34.5)
MCV RBC AUTO: 89.7 FL (ref 80–99.9)
MONOCYTES NFR BLD: 0.55 K/UL (ref 0.1–0.95)
MONOCYTES NFR BLD: 7 % (ref 2–12)
NEUTROPHILS NFR BLD: 76 % (ref 43–80)
NEUTS SEG NFR BLD: 6.02 K/UL (ref 1.8–7.3)
PLATELET # BLD AUTO: 302 K/UL (ref 130–450)
PMV BLD AUTO: 9.9 FL (ref 7–12)
POTASSIUM SERPL-SCNC: 4.6 MMOL/L (ref 3.5–5)
PROT SERPL-MCNC: 7.1 G/DL (ref 6.4–8.3)
RBC # BLD AUTO: 2.82 M/UL (ref 3.8–5.8)
SODIUM SERPL-SCNC: 135 MMOL/L (ref 132–146)
WBC OTHER # BLD: 8 K/UL (ref 4.5–11.5)

## 2025-03-07 ENCOUNTER — HOSPITAL ENCOUNTER (OUTPATIENT)
Dept: CARDIOLOGY | Age: 85
Discharge: HOME | End: 2025-03-09
Attending: STUDENT IN AN ORGANIZED HEALTH CARE EDUCATION/TRAINING PROGRAM
Payer: MEDICARE

## 2025-03-07 DIAGNOSIS — I65.23 CAROTID STENOSIS, BILATERAL: ICD-10-CM

## 2025-03-07 LAB
MICROORGANISM SPEC CULT: NORMAL
SPECIMEN DESCRIPTION: NORMAL
VAS LEFT CCA DIST EDV: 6 CM/S
VAS LEFT CCA DIST PSV: 73.4 CM/S
VAS LEFT CCA PROX EDV: 0 CM/S
VAS LEFT CCA PROX PSV: 126.8 CM/S
VAS LEFT ECA EDV: 0 CM/S
VAS LEFT ECA PSV: 97.8 CM/S
VAS LEFT ICA DIST EDV: 28 CM/S
VAS LEFT ICA DIST PSV: 179.8 CM/S
VAS LEFT ICA MID EDV: 34.9 CM/S
VAS LEFT ICA MID PSV: 211.3 CM/S
VAS LEFT ICA PROX EDV: 67.9 CM/S
VAS LEFT ICA PROX PSV: 327.4 CM/S
VAS LEFT ICA/CCA PSV: 2.6 NO UNITS
VAS LEFT VERTEBRAL EDV: 18 CM/S
VAS LEFT VERTEBRAL PSV: 65.1 CM/S
VAS RIGHT CCA DIST EDV: 9.7 CM/S
VAS RIGHT CCA DIST PSV: 81.9 CM/S
VAS RIGHT CCA PROX EDV: 5.3 CM/S
VAS RIGHT CCA PROX PSV: 114.8 CM/S
VAS RIGHT ECA EDV: 0 CM/S
VAS RIGHT ECA PSV: 74.6 CM/S
VAS RIGHT ICA DIST EDV: 15.6 CM/S
VAS RIGHT ICA DIST PSV: 68.9 CM/S
VAS RIGHT ICA MID EDV: 20.5 CM/S
VAS RIGHT ICA MID PSV: 88.2 CM/S
VAS RIGHT ICA PROX EDV: 18.1 CM/S
VAS RIGHT ICA PROX PSV: 94.4 CM/S
VAS RIGHT ICA/CCA PSV: 0.8 NO UNITS

## 2025-03-07 PROCEDURE — 93880 EXTRACRANIAL BILAT STUDY: CPT

## 2025-03-10 LAB
VAS LEFT CCA DIST EDV: 6 CM/S
VAS LEFT CCA DIST PSV: 73.4 CM/S
VAS LEFT CCA PROX EDV: 0 CM/S
VAS LEFT CCA PROX PSV: 126.8 CM/S
VAS LEFT ECA EDV: 0 CM/S
VAS LEFT ECA PSV: 97.8 CM/S
VAS LEFT ICA DIST EDV: 28 CM/S
VAS LEFT ICA DIST PSV: 179.8 CM/S
VAS LEFT ICA MID EDV: 34.9 CM/S
VAS LEFT ICA MID PSV: 211.3 CM/S
VAS LEFT ICA PROX EDV: 67.9 CM/S
VAS LEFT ICA PROX PSV: 327.4 CM/S
VAS LEFT ICA/CCA PSV: 2.6 NO UNITS
VAS LEFT VERTEBRAL EDV: 18 CM/S
VAS LEFT VERTEBRAL PSV: 65.1 CM/S
VAS RIGHT CCA DIST EDV: 9.7 CM/S
VAS RIGHT CCA DIST PSV: 81.9 CM/S
VAS RIGHT CCA PROX EDV: 5.3 CM/S
VAS RIGHT CCA PROX PSV: 114.8 CM/S
VAS RIGHT ECA EDV: 0 CM/S
VAS RIGHT ECA PSV: 74.6 CM/S
VAS RIGHT ICA DIST EDV: 15.6 CM/S
VAS RIGHT ICA DIST PSV: 68.9 CM/S
VAS RIGHT ICA MID EDV: 20.5 CM/S
VAS RIGHT ICA MID PSV: 88.2 CM/S
VAS RIGHT ICA PROX EDV: 18.1 CM/S
VAS RIGHT ICA PROX PSV: 94.4 CM/S
VAS RIGHT ICA/CCA PSV: 0.8 NO UNITS

## 2025-03-10 PROCEDURE — 93880 EXTRACRANIAL BILAT STUDY: CPT | Performed by: STUDENT IN AN ORGANIZED HEALTH CARE EDUCATION/TRAINING PROGRAM

## 2025-03-11 LAB
ALBUMIN SERPL-MCNC: 3.5 G/DL (ref 3.5–5.2)
ALP SERPL-CCNC: 96 U/L (ref 40–129)
ALT SERPL-CCNC: 23 U/L (ref 0–40)
ANION GAP SERPL CALCULATED.3IONS-SCNC: 15 MMOL/L (ref 7–16)
AST SERPL-CCNC: 25 U/L (ref 0–39)
BASOPHILS # BLD: 0.02 K/UL (ref 0–0.2)
BASOPHILS NFR BLD: 0 % (ref 0–2)
BILIRUB SERPL-MCNC: 0.5 MG/DL (ref 0–1.2)
BUN SERPL-MCNC: 51 MG/DL (ref 6–23)
CALCIUM SERPL-MCNC: 9.2 MG/DL (ref 8.6–10.2)
CHLORIDE SERPL-SCNC: 96 MMOL/L (ref 98–107)
CO2 SERPL-SCNC: 21 MMOL/L (ref 22–29)
CREAT SERPL-MCNC: 1.6 MG/DL (ref 0.7–1.2)
CRP SERPL HS-MCNC: 10 MG/L (ref 0–5)
EOSINOPHIL # BLD: 0.19 K/UL (ref 0.05–0.5)
EOSINOPHILS RELATIVE PERCENT: 2 % (ref 0–6)
ERYTHROCYTE [DISTWIDTH] IN BLOOD BY AUTOMATED COUNT: 17.6 % (ref 11.5–15)
GFR, ESTIMATED: 44 ML/MIN/1.73M2
GLUCOSE SERPL-MCNC: 179 MG/DL (ref 74–99)
HCT VFR BLD AUTO: 28.3 % (ref 37–54)
HGB BLD-MCNC: 9.1 G/DL (ref 12.5–16.5)
IMM GRANULOCYTES # BLD AUTO: 0.07 K/UL (ref 0–0.58)
IMM GRANULOCYTES NFR BLD: 1 % (ref 0–5)
LYMPHOCYTES NFR BLD: 1.21 K/UL (ref 1.5–4)
LYMPHOCYTES RELATIVE PERCENT: 14 % (ref 20–42)
MCH RBC QN AUTO: 29.4 PG (ref 26–35)
MCHC RBC AUTO-ENTMCNC: 32.2 G/DL (ref 32–34.5)
MCV RBC AUTO: 91.6 FL (ref 80–99.9)
MONOCYTES NFR BLD: 0.75 K/UL (ref 0.1–0.95)
MONOCYTES NFR BLD: 8 % (ref 2–12)
NEUTROPHILS NFR BLD: 75 % (ref 43–80)
NEUTS SEG NFR BLD: 6.68 K/UL (ref 1.8–7.3)
PLATELET # BLD AUTO: 284 K/UL (ref 130–450)
PMV BLD AUTO: 10.5 FL (ref 7–12)
POTASSIUM SERPL-SCNC: 4.8 MMOL/L (ref 3.5–5)
PROT SERPL-MCNC: 7.1 G/DL (ref 6.4–8.3)
RBC # BLD AUTO: 3.09 M/UL (ref 3.8–5.8)
SODIUM SERPL-SCNC: 132 MMOL/L (ref 132–146)
WBC OTHER # BLD: 8.9 K/UL (ref 4.5–11.5)

## 2025-03-12 ENCOUNTER — TELEPHONE (OUTPATIENT)
Dept: VASCULAR SURGERY | Age: 85
End: 2025-03-12

## 2025-03-12 LAB — ERYTHROCYTE [SEDIMENTATION RATE] IN BLOOD BY WESTERGREN METHOD: 35 MM/HR (ref 0–15)

## 2025-03-12 NOTE — TELEPHONE ENCOUNTER
Called to get results on test done. Vascular duplex Carotid bilateral. The test was done on 3/7/25.

## 2025-03-15 LAB
MICROORGANISM SPEC CULT: NORMAL
SPECIMEN DESCRIPTION: NORMAL

## 2025-04-01 ENCOUNTER — APPOINTMENT (OUTPATIENT)
Dept: GENERAL RADIOLOGY | Age: 85
DRG: 987 | End: 2025-04-01
Attending: FAMILY MEDICINE
Payer: MEDICARE

## 2025-04-01 ENCOUNTER — HOSPITAL ENCOUNTER (INPATIENT)
Age: 85
LOS: 7 days | Discharge: HOME HEALTH | DRG: 987 | End: 2025-04-08
Attending: FAMILY MEDICINE | Admitting: STUDENT IN AN ORGANIZED HEALTH CARE EDUCATION/TRAINING PROGRAM
Payer: MEDICARE

## 2025-04-01 DIAGNOSIS — M86.9: ICD-10-CM

## 2025-04-01 DIAGNOSIS — I31.39 PERICARDIAL EFFUSION: Primary | ICD-10-CM

## 2025-04-01 PROBLEM — E87.1 HYPONATREMIA: Status: ACTIVE | Noted: 2025-04-01

## 2025-04-01 LAB
ALBUMIN SERPL-MCNC: 3.1 G/DL (ref 3.5–5.2)
ALP SERPL-CCNC: 159 U/L (ref 40–129)
ALT SERPL-CCNC: 21 U/L (ref 0–40)
ANION GAP SERPL CALCULATED.3IONS-SCNC: 15 MMOL/L (ref 7–16)
AST SERPL-CCNC: 24 U/L (ref 0–39)
BILIRUB SERPL-MCNC: 0.7 MG/DL (ref 0–1.2)
BUN SERPL-MCNC: 88 MG/DL (ref 6–23)
CA-I BLD-SCNC: 1.2 MMOL/L (ref 1.15–1.33)
CALCIUM SERPL-MCNC: 8.8 MG/DL (ref 8.6–10.2)
CHLORIDE SERPL-SCNC: 89 MMOL/L (ref 98–107)
CO2 SERPL-SCNC: 20 MMOL/L (ref 22–29)
CREAT SERPL-MCNC: 2.4 MG/DL (ref 0.7–1.2)
GFR, ESTIMATED: 26 ML/MIN/1.73M2
GLUCOSE BLD-MCNC: 218 MG/DL (ref 74–99)
GLUCOSE BLD-MCNC: 229 MG/DL (ref 74–99)
GLUCOSE BLD-MCNC: 234 MG/DL (ref 74–99)
GLUCOSE BLD-MCNC: 401 MG/DL (ref 74–99)
GLUCOSE SERPL-MCNC: 192 MG/DL (ref 74–99)
MAGNESIUM SERPL-MCNC: 1.6 MG/DL (ref 1.6–2.6)
PHOSPHATE SERPL-MCNC: 4.3 MG/DL (ref 2.5–4.5)
POTASSIUM SERPL-SCNC: 4.5 MMOL/L (ref 3.5–5)
PROT SERPL-MCNC: 6.3 G/DL (ref 6.4–8.3)
SODIUM SERPL-SCNC: 124 MMOL/L (ref 132–146)

## 2025-04-01 PROCEDURE — 73610 X-RAY EXAM OF ANKLE: CPT

## 2025-04-01 PROCEDURE — 2500000003 HC RX 250 WO HCPCS: Performed by: STUDENT IN AN ORGANIZED HEALTH CARE EDUCATION/TRAINING PROGRAM

## 2025-04-01 PROCEDURE — 94660 CPAP INITIATION&MGMT: CPT

## 2025-04-01 PROCEDURE — 83735 ASSAY OF MAGNESIUM: CPT

## 2025-04-01 PROCEDURE — 6370000000 HC RX 637 (ALT 250 FOR IP)

## 2025-04-01 PROCEDURE — 99222 1ST HOSP IP/OBS MODERATE 55: CPT | Performed by: STUDENT IN AN ORGANIZED HEALTH CARE EDUCATION/TRAINING PROGRAM

## 2025-04-01 PROCEDURE — 6370000000 HC RX 637 (ALT 250 FOR IP): Performed by: STUDENT IN AN ORGANIZED HEALTH CARE EDUCATION/TRAINING PROGRAM

## 2025-04-01 PROCEDURE — 5A09357 ASSISTANCE WITH RESPIRATORY VENTILATION, LESS THAN 24 CONSECUTIVE HOURS, CONTINUOUS POSITIVE AIRWAY PRESSURE: ICD-10-PCS | Performed by: STUDENT IN AN ORGANIZED HEALTH CARE EDUCATION/TRAINING PROGRAM

## 2025-04-01 PROCEDURE — 2140000000 HC CCU INTERMEDIATE R&B

## 2025-04-01 PROCEDURE — 80053 COMPREHEN METABOLIC PANEL: CPT

## 2025-04-01 PROCEDURE — 73630 X-RAY EXAM OF FOOT: CPT

## 2025-04-01 PROCEDURE — 94640 AIRWAY INHALATION TREATMENT: CPT

## 2025-04-01 PROCEDURE — 36415 COLL VENOUS BLD VENIPUNCTURE: CPT

## 2025-04-01 PROCEDURE — 82330 ASSAY OF CALCIUM: CPT

## 2025-04-01 PROCEDURE — 6360000002 HC RX W HCPCS: Performed by: STUDENT IN AN ORGANIZED HEALTH CARE EDUCATION/TRAINING PROGRAM

## 2025-04-01 PROCEDURE — 84100 ASSAY OF PHOSPHORUS: CPT

## 2025-04-01 PROCEDURE — 82962 GLUCOSE BLOOD TEST: CPT

## 2025-04-01 RX ORDER — GENTAMICIN SULFATE 1 MG/G
OINTMENT TOPICAL 3 TIMES DAILY
Status: DISCONTINUED | OUTPATIENT
Start: 2025-04-01 | End: 2025-04-01

## 2025-04-01 RX ORDER — HEPARIN SODIUM 5000 [USP'U]/ML
5000 INJECTION, SOLUTION INTRAVENOUS; SUBCUTANEOUS EVERY 8 HOURS SCHEDULED
Status: DISCONTINUED | OUTPATIENT
Start: 2025-04-01 | End: 2025-04-08 | Stop reason: HOSPADM

## 2025-04-01 RX ORDER — ATORVASTATIN CALCIUM 40 MG/1
40 TABLET, FILM COATED ORAL DAILY
Status: DISCONTINUED | OUTPATIENT
Start: 2025-04-02 | End: 2025-04-08 | Stop reason: HOSPADM

## 2025-04-01 RX ORDER — DEXTROSE MONOHYDRATE 100 MG/ML
INJECTION, SOLUTION INTRAVENOUS CONTINUOUS PRN
Status: DISCONTINUED | OUTPATIENT
Start: 2025-04-01 | End: 2025-04-08 | Stop reason: HOSPADM

## 2025-04-01 RX ORDER — ACETAMINOPHEN 650 MG/1
650 SUPPOSITORY RECTAL EVERY 6 HOURS PRN
Status: DISCONTINUED | OUTPATIENT
Start: 2025-04-01 | End: 2025-04-08 | Stop reason: HOSPADM

## 2025-04-01 RX ORDER — INSULIN GLARGINE 100 [IU]/ML
14 INJECTION, SOLUTION SUBCUTANEOUS NIGHTLY
Status: DISCONTINUED | OUTPATIENT
Start: 2025-04-01 | End: 2025-04-08 | Stop reason: HOSPADM

## 2025-04-01 RX ORDER — GLUCAGON 1 MG/ML
1 KIT INJECTION PRN
Status: DISCONTINUED | OUTPATIENT
Start: 2025-04-01 | End: 2025-04-08 | Stop reason: HOSPADM

## 2025-04-01 RX ORDER — CARVEDILOL 6.25 MG/1
12.5 TABLET ORAL 2 TIMES DAILY WITH MEALS
Status: DISCONTINUED | OUTPATIENT
Start: 2025-04-01 | End: 2025-04-08 | Stop reason: HOSPADM

## 2025-04-01 RX ORDER — SODIUM CHLORIDE 0.9 % (FLUSH) 0.9 %
5-40 SYRINGE (ML) INJECTION EVERY 12 HOURS SCHEDULED
Status: DISCONTINUED | OUTPATIENT
Start: 2025-04-01 | End: 2025-04-08 | Stop reason: HOSPADM

## 2025-04-01 RX ORDER — ASPIRIN 81 MG/1
81 TABLET ORAL DAILY
Status: DISCONTINUED | OUTPATIENT
Start: 2025-04-02 | End: 2025-04-08 | Stop reason: HOSPADM

## 2025-04-01 RX ORDER — ASPIRIN 81 MG/1
81 TABLET ORAL 2 TIMES DAILY
Status: DISCONTINUED | OUTPATIENT
Start: 2025-04-01 | End: 2025-04-01

## 2025-04-01 RX ORDER — ACETAMINOPHEN 325 MG/1
650 TABLET ORAL EVERY 6 HOURS PRN
Status: DISCONTINUED | OUTPATIENT
Start: 2025-04-01 | End: 2025-04-08 | Stop reason: HOSPADM

## 2025-04-01 RX ORDER — LISINOPRIL 10 MG/1
10 TABLET ORAL DAILY
Status: DISCONTINUED | OUTPATIENT
Start: 2025-04-01 | End: 2025-04-01

## 2025-04-01 RX ORDER — SODIUM BICARBONATE 650 MG/1
650 TABLET ORAL 4 TIMES DAILY
Status: DISCONTINUED | OUTPATIENT
Start: 2025-04-01 | End: 2025-04-08 | Stop reason: HOSPADM

## 2025-04-01 RX ORDER — SODIUM CHLORIDE 9 MG/ML
INJECTION, SOLUTION INTRAVENOUS PRN
Status: DISCONTINUED | OUTPATIENT
Start: 2025-04-01 | End: 2025-04-08 | Stop reason: HOSPADM

## 2025-04-01 RX ORDER — SODIUM CHLORIDE 0.9 % (FLUSH) 0.9 %
5-40 SYRINGE (ML) INJECTION PRN
Status: DISCONTINUED | OUTPATIENT
Start: 2025-04-01 | End: 2025-04-08 | Stop reason: HOSPADM

## 2025-04-01 RX ORDER — GENTAMICIN SULFATE 1 MG/G
OINTMENT TOPICAL 2 TIMES DAILY
Status: DISCONTINUED | OUTPATIENT
Start: 2025-04-01 | End: 2025-04-08 | Stop reason: HOSPADM

## 2025-04-01 RX ORDER — CLOPIDOGREL BISULFATE 75 MG/1
75 TABLET ORAL DAILY
Status: DISCONTINUED | OUTPATIENT
Start: 2025-04-01 | End: 2025-04-08 | Stop reason: HOSPADM

## 2025-04-01 RX ORDER — PANTOPRAZOLE SODIUM 40 MG/1
40 TABLET, DELAYED RELEASE ORAL DAILY
Status: DISCONTINUED | OUTPATIENT
Start: 2025-04-02 | End: 2025-04-08 | Stop reason: HOSPADM

## 2025-04-01 RX ORDER — ONDANSETRON 2 MG/ML
4 INJECTION INTRAMUSCULAR; INTRAVENOUS EVERY 6 HOURS PRN
Status: DISCONTINUED | OUTPATIENT
Start: 2025-04-01 | End: 2025-04-08 | Stop reason: HOSPADM

## 2025-04-01 RX ORDER — DOXYCYCLINE 100 MG/1
100 CAPSULE ORAL EVERY 12 HOURS SCHEDULED
Status: DISCONTINUED | OUTPATIENT
Start: 2025-04-01 | End: 2025-04-02

## 2025-04-01 RX ORDER — INSULIN LISPRO 100 [IU]/ML
0-8 INJECTION, SOLUTION INTRAVENOUS; SUBCUTANEOUS
Status: DISCONTINUED | OUTPATIENT
Start: 2025-04-01 | End: 2025-04-08 | Stop reason: HOSPADM

## 2025-04-01 RX ORDER — TIMOLOL MALEATE 5 MG/ML
1 SOLUTION/ DROPS OPHTHALMIC 2 TIMES DAILY
Status: DISCONTINUED | OUTPATIENT
Start: 2025-04-01 | End: 2025-04-08 | Stop reason: HOSPADM

## 2025-04-01 RX ORDER — POLYETHYLENE GLYCOL 3350 17 G/17G
17 POWDER, FOR SOLUTION ORAL DAILY PRN
Status: DISCONTINUED | OUTPATIENT
Start: 2025-04-01 | End: 2025-04-08 | Stop reason: HOSPADM

## 2025-04-01 RX ORDER — IPRATROPIUM BROMIDE AND ALBUTEROL SULFATE 2.5; .5 MG/3ML; MG/3ML
1 SOLUTION RESPIRATORY (INHALATION) EVERY 4 HOURS
Status: DISCONTINUED | OUTPATIENT
Start: 2025-04-01 | End: 2025-04-05

## 2025-04-01 RX ORDER — BRIMONIDINE TARTRATE 2 MG/ML
1 SOLUTION/ DROPS OPHTHALMIC 2 TIMES DAILY
Status: DISCONTINUED | OUTPATIENT
Start: 2025-04-01 | End: 2025-04-08 | Stop reason: HOSPADM

## 2025-04-01 RX ORDER — ONDANSETRON 4 MG/1
4 TABLET, ORALLY DISINTEGRATING ORAL EVERY 8 HOURS PRN
Status: DISCONTINUED | OUTPATIENT
Start: 2025-04-01 | End: 2025-04-08 | Stop reason: HOSPADM

## 2025-04-01 RX ADMIN — IPRATROPIUM BROMIDE AND ALBUTEROL SULFATE 1 DOSE: 2.5; .5 SOLUTION RESPIRATORY (INHALATION) at 19:29

## 2025-04-01 RX ADMIN — BRIMONIDINE TARTRATE 1 DROP: 2 SOLUTION OPHTHALMIC at 21:28

## 2025-04-01 RX ADMIN — CARVEDILOL 12.5 MG: 6.25 TABLET, FILM COATED ORAL at 21:25

## 2025-04-01 RX ADMIN — SODIUM BICARBONATE 650 MG: 650 TABLET ORAL at 21:25

## 2025-04-01 RX ADMIN — TIMOLOL MALEATE 1 DROP: 5 SOLUTION/ DROPS OPHTHALMIC at 21:28

## 2025-04-01 RX ADMIN — INSULIN LISPRO 2 UNITS: 100 INJECTION, SOLUTION INTRAVENOUS; SUBCUTANEOUS at 17:39

## 2025-04-01 RX ADMIN — HEPARIN SODIUM 5000 UNITS: 5000 INJECTION INTRAVENOUS; SUBCUTANEOUS at 21:26

## 2025-04-01 RX ADMIN — INSULIN GLARGINE 14 UNITS: 100 INJECTION, SOLUTION SUBCUTANEOUS at 21:27

## 2025-04-01 RX ADMIN — GENTAMICIN SULFATE: 1 OINTMENT TOPICAL at 21:26

## 2025-04-01 RX ADMIN — ASPIRIN 81 MG: 81 TABLET, COATED ORAL at 21:25

## 2025-04-01 RX ADMIN — SODIUM CHLORIDE, PRESERVATIVE FREE 10 ML: 5 INJECTION INTRAVENOUS at 21:27

## 2025-04-01 RX ADMIN — INSULIN LISPRO 2 UNITS: 100 INJECTION, SOLUTION INTRAVENOUS; SUBCUTANEOUS at 21:27

## 2025-04-01 RX ADMIN — CLOPIDOGREL BISULFATE 75 MG: 75 TABLET, FILM COATED ORAL at 21:25

## 2025-04-01 RX ADMIN — DOXYCYCLINE HYCLATE 100 MG: 100 CAPSULE ORAL at 21:26

## 2025-04-01 NOTE — H&P
Hold plavix in lieu of possible procedures  ISS, lantus 14 U QHS, I will obtain a protocol  Podiatry on board.  Appreciate recommendations.  Continue doxycycline 100 mg twice daily.  Consult ID for further recommendations  Breathing treatments  BiPAP ordered  Home meds         Diet: ADULT DIET; Regular; 5 carb choices (75 gm/meal); Low Fat/Low Chol/High Fiber/2 gm Na; 1200 ml  Code Status: Full Code  Surrogate decision maker confirmed with patient:   Extended Emergency Contact Information  Primary Emergency Contact: Rosa Isela Up  Address: [address] [place] [address]  Home Phone: [phone]  Mobile Phone: [phone]  Relation: Spouse  Secondary Emergency Contact: Margot Up IIry  Home Phone: [phone]  Mobile Phone: [phone]  Relation: Child    DVT Prophylaxis: []Lovenox [x]Heparin []PCD [] Warfarin/NOAC []Encouraged ambulation  Disposition: []Med/Surg [x] Intermediate [] ICU/CCU  Admit status: [] Observation [x] Inpatient     +++++++++++++++++++++++++++++++++++++++++++++++++  Maria Marino Milanes, MD  [facility] Hospitalist  [place], [place]  +++++++++++++++++++++++++++++++++++++++++++++++++  NOTE: This report was transcribed using voice recognition software. Every effort was made to ensure accuracy; however, inadvertent computerized transcription errors may be present.

## 2025-04-02 ENCOUNTER — APPOINTMENT (OUTPATIENT)
Age: 85
DRG: 987 | End: 2025-04-02
Attending: INTERNAL MEDICINE
Payer: MEDICARE

## 2025-04-02 LAB
ANION GAP SERPL CALCULATED.3IONS-SCNC: 15 MMOL/L (ref 7–16)
ANION GAP SERPL CALCULATED.3IONS-SCNC: 16 MMOL/L (ref 7–16)
BNP SERPL-MCNC: 2555 PG/ML (ref 0–450)
BUN SERPL-MCNC: 81 MG/DL (ref 6–23)
BUN SERPL-MCNC: 82 MG/DL (ref 6–23)
BUN SERPL-MCNC: 84 MG/DL (ref 6–23)
BUN SERPL-MCNC: 84 MG/DL (ref 6–23)
CALCIUM SERPL-MCNC: 8.7 MG/DL (ref 8.6–10.2)
CALCIUM SERPL-MCNC: 8.7 MG/DL (ref 8.6–10.2)
CALCIUM SERPL-MCNC: 8.8 MG/DL (ref 8.6–10.2)
CALCIUM SERPL-MCNC: 8.9 MG/DL (ref 8.6–10.2)
CHLORIDE SERPL-SCNC: 91 MMOL/L (ref 98–107)
CHLORIDE SERPL-SCNC: 92 MMOL/L (ref 98–107)
CHLORIDE SERPL-SCNC: 93 MMOL/L (ref 98–107)
CHLORIDE SERPL-SCNC: 93 MMOL/L (ref 98–107)
CO2 SERPL-SCNC: 18 MMOL/L (ref 22–29)
CO2 SERPL-SCNC: 18 MMOL/L (ref 22–29)
CO2 SERPL-SCNC: 19 MMOL/L (ref 22–29)
CO2 SERPL-SCNC: 19 MMOL/L (ref 22–29)
CREAT SERPL-MCNC: 2.2 MG/DL (ref 0.7–1.2)
CREAT SERPL-MCNC: 2.3 MG/DL (ref 0.7–1.2)
ECHO BSA: 2.04 M2
ECHO LV EF PHYSICIAN: 49 %
GFR, ESTIMATED: 27 ML/MIN/1.73M2
GFR, ESTIMATED: 29 ML/MIN/1.73M2
GLUCOSE BLD-MCNC: 109 MG/DL (ref 74–99)
GLUCOSE BLD-MCNC: 187 MG/DL (ref 74–99)
GLUCOSE BLD-MCNC: 191 MG/DL (ref 74–99)
GLUCOSE BLD-MCNC: 203 MG/DL (ref 74–99)
GLUCOSE SERPL-MCNC: 100 MG/DL (ref 74–99)
GLUCOSE SERPL-MCNC: 177 MG/DL (ref 74–99)
GLUCOSE SERPL-MCNC: 190 MG/DL (ref 74–99)
GLUCOSE SERPL-MCNC: 64 MG/DL (ref 74–99)
PHOSPHATE SERPL-MCNC: 4 MG/DL (ref 2.5–4.5)
POTASSIUM SERPL-SCNC: 4.2 MMOL/L (ref 3.5–5)
POTASSIUM SERPL-SCNC: 4.2 MMOL/L (ref 3.5–5)
POTASSIUM SERPL-SCNC: 4.3 MMOL/L (ref 3.5–5)
POTASSIUM SERPL-SCNC: 4.4 MMOL/L (ref 3.5–5)
SODIUM SERPL-SCNC: 124 MMOL/L (ref 132–146)
SODIUM SERPL-SCNC: 127 MMOL/L (ref 132–146)
SODIUM SERPL-SCNC: 127 MMOL/L (ref 132–146)
SODIUM SERPL-SCNC: 128 MMOL/L (ref 132–146)

## 2025-04-02 PROCEDURE — 99232 SBSQ HOSP IP/OBS MODERATE 35: CPT | Performed by: STUDENT IN AN ORGANIZED HEALTH CARE EDUCATION/TRAINING PROGRAM

## 2025-04-02 PROCEDURE — 2140000000 HC CCU INTERMEDIATE R&B

## 2025-04-02 PROCEDURE — 84100 ASSAY OF PHOSPHORUS: CPT

## 2025-04-02 PROCEDURE — 82962 GLUCOSE BLOOD TEST: CPT

## 2025-04-02 PROCEDURE — 6370000000 HC RX 637 (ALT 250 FOR IP): Performed by: FAMILY MEDICINE

## 2025-04-02 PROCEDURE — 6360000002 HC RX W HCPCS

## 2025-04-02 PROCEDURE — P9047 ALBUMIN (HUMAN), 25%, 50ML: HCPCS

## 2025-04-02 PROCEDURE — 2500000003 HC RX 250 WO HCPCS: Performed by: STUDENT IN AN ORGANIZED HEALTH CARE EDUCATION/TRAINING PROGRAM

## 2025-04-02 PROCEDURE — 2580000003 HC RX 258

## 2025-04-02 PROCEDURE — 6370000000 HC RX 637 (ALT 250 FOR IP): Performed by: STUDENT IN AN ORGANIZED HEALTH CARE EDUCATION/TRAINING PROGRAM

## 2025-04-02 PROCEDURE — 80048 BASIC METABOLIC PNL TOTAL CA: CPT

## 2025-04-02 PROCEDURE — 51798 US URINE CAPACITY MEASURE: CPT

## 2025-04-02 PROCEDURE — 83880 ASSAY OF NATRIURETIC PEPTIDE: CPT

## 2025-04-02 PROCEDURE — 93308 TTE F-UP OR LMTD: CPT

## 2025-04-02 PROCEDURE — 94660 CPAP INITIATION&MGMT: CPT

## 2025-04-02 PROCEDURE — 36415 COLL VENOUS BLD VENIPUNCTURE: CPT

## 2025-04-02 PROCEDURE — 94640 AIRWAY INHALATION TREATMENT: CPT

## 2025-04-02 RX ORDER — SODIUM CHLORIDE, SODIUM LACTATE, POTASSIUM CHLORIDE, CALCIUM CHLORIDE 600; 310; 30; 20 MG/100ML; MG/100ML; MG/100ML; MG/100ML
INJECTION, SOLUTION INTRAVENOUS CONTINUOUS
Status: DISCONTINUED | OUTPATIENT
Start: 2025-04-02 | End: 2025-04-04

## 2025-04-02 RX ORDER — ALBUMIN (HUMAN) 12.5 G/50ML
25 SOLUTION INTRAVENOUS ONCE
Status: COMPLETED | OUTPATIENT
Start: 2025-04-02 | End: 2025-04-02

## 2025-04-02 RX ADMIN — SODIUM BICARBONATE 650 MG: 650 TABLET ORAL at 10:00

## 2025-04-02 RX ADMIN — IPRATROPIUM BROMIDE AND ALBUTEROL SULFATE 1 DOSE: 2.5; .5 SOLUTION RESPIRATORY (INHALATION) at 21:09

## 2025-04-02 RX ADMIN — IPRATROPIUM BROMIDE AND ALBUTEROL SULFATE 1 DOSE: 2.5; .5 SOLUTION RESPIRATORY (INHALATION) at 12:26

## 2025-04-02 RX ADMIN — BRIMONIDINE TARTRATE 1 DROP: 2 SOLUTION OPHTHALMIC at 10:02

## 2025-04-02 RX ADMIN — TIMOLOL MALEATE 1 DROP: 5 SOLUTION/ DROPS OPHTHALMIC at 20:40

## 2025-04-02 RX ADMIN — IPRATROPIUM BROMIDE AND ALBUTEROL SULFATE 1 DOSE: 2.5; .5 SOLUTION RESPIRATORY (INHALATION) at 16:21

## 2025-04-02 RX ADMIN — PANTOPRAZOLE SODIUM 40 MG: 40 TABLET, DELAYED RELEASE ORAL at 10:00

## 2025-04-02 RX ADMIN — IPRATROPIUM BROMIDE AND ALBUTEROL SULFATE 1 DOSE: 2.5; .5 SOLUTION RESPIRATORY (INHALATION) at 00:54

## 2025-04-02 RX ADMIN — IPRATROPIUM BROMIDE AND ALBUTEROL SULFATE 1 DOSE: 2.5; .5 SOLUTION RESPIRATORY (INHALATION) at 08:48

## 2025-04-02 RX ADMIN — DOXYCYCLINE HYCLATE 100 MG: 100 CAPSULE ORAL at 09:59

## 2025-04-02 RX ADMIN — SODIUM BICARBONATE 650 MG: 650 TABLET ORAL at 20:40

## 2025-04-02 RX ADMIN — SODIUM BICARBONATE 650 MG: 650 TABLET ORAL at 17:31

## 2025-04-02 RX ADMIN — CARVEDILOL 12.5 MG: 6.25 TABLET, FILM COATED ORAL at 17:30

## 2025-04-02 RX ADMIN — SODIUM CHLORIDE, SODIUM LACTATE, POTASSIUM CHLORIDE, AND CALCIUM CHLORIDE: .6; .31; .03; .02 INJECTION, SOLUTION INTRAVENOUS at 12:16

## 2025-04-02 RX ADMIN — BRIMONIDINE TARTRATE 1 DROP: 2 SOLUTION OPHTHALMIC at 20:40

## 2025-04-02 RX ADMIN — CARVEDILOL 12.5 MG: 6.25 TABLET, FILM COATED ORAL at 10:00

## 2025-04-02 RX ADMIN — ALBUMIN (HUMAN) 25 G: 0.25 INJECTION, SOLUTION INTRAVENOUS at 12:19

## 2025-04-02 RX ADMIN — INSULIN LISPRO 2 UNITS: 100 INJECTION, SOLUTION INTRAVENOUS; SUBCUTANEOUS at 20:39

## 2025-04-02 RX ADMIN — SODIUM BICARBONATE 650 MG: 650 TABLET ORAL at 12:11

## 2025-04-02 RX ADMIN — INSULIN LISPRO 2 UNITS: 100 INJECTION, SOLUTION INTRAVENOUS; SUBCUTANEOUS at 13:15

## 2025-04-02 RX ADMIN — INSULIN LISPRO 2 UNITS: 100 INJECTION, SOLUTION INTRAVENOUS; SUBCUTANEOUS at 17:30

## 2025-04-02 RX ADMIN — TIMOLOL MALEATE 1 DROP: 5 SOLUTION/ DROPS OPHTHALMIC at 10:02

## 2025-04-02 RX ADMIN — PETROLATUM: 420 OINTMENT TOPICAL at 10:12

## 2025-04-02 RX ADMIN — SODIUM CHLORIDE, PRESERVATIVE FREE 10 ML: 5 INJECTION INTRAVENOUS at 20:40

## 2025-04-02 RX ADMIN — SODIUM CHLORIDE, PRESERVATIVE FREE 10 ML: 5 INJECTION INTRAVENOUS at 10:00

## 2025-04-02 NOTE — ACP (ADVANCE CARE PLANNING)
Advance Care Planning     Palliative Team Advance Care Planning (ACP) Conversation    Date of Conversation: 04/02/25    Individuals present for the conversation: Patient and Son Aubrey     ACP documents on file prior to discussion:  -None    Previously completed document/s not on file:  HCPOA, Living WIll document was completed previously but it is not available for review. This writer requested a copy of the document for patient's chart.     Healthcare Decision Maker:    Primary Decision Maker: AnnelRosa Isela - Spouse - [phone]    Secondary Decision Maker: Annel PAEZAubrey - Child - [phone]     Conversation Summary:  Requested copy of advance directives which son states names pts wife as main agent and himself as alternate    Resuscitation Status:   Code Status: Full Code     Documentation Completed:  -Patient/surrogate was asked to submit existing ACP documents for our records    I spent 16 minutes with the patient and/or surrogate decision maker discussing the patient's wishes and goals.      DOMINICK Bains

## 2025-04-02 NOTE — ACP (ADVANCE CARE PLANNING)
Advance Care Planning   The patient has the following advanced directives on file:  Advance Directives       Power of  Living Will ACP-Advance Directive ACP-Power of     Not on File Not on File Not on File Not on File            The patient has appointed the following active healthcare agents:    Primary Decision Maker: Rosa Isela Up - Spouse - [phone]    Secondary Decision Maker: Annel PAEZAubrey - Child - [phone]    The Patient has the following current code status:    Code Status: Full Code      Cinthia Allen, DOMINICK  4/2/2025

## 2025-04-03 ENCOUNTER — APPOINTMENT (OUTPATIENT)
Dept: GENERAL RADIOLOGY | Age: 85
DRG: 987 | End: 2025-04-03
Attending: FAMILY MEDICINE
Payer: MEDICARE

## 2025-04-03 LAB
ANION GAP SERPL CALCULATED.3IONS-SCNC: 14 MMOL/L (ref 7–16)
ANION GAP SERPL CALCULATED.3IONS-SCNC: 15 MMOL/L (ref 7–16)
ANION GAP SERPL CALCULATED.3IONS-SCNC: 17 MMOL/L (ref 7–16)
ANION GAP SERPL CALCULATED.3IONS-SCNC: 18 MMOL/L (ref 7–16)
BUN SERPL-MCNC: 76 MG/DL (ref 6–23)
BUN SERPL-MCNC: 76 MG/DL (ref 6–23)
BUN SERPL-MCNC: 78 MG/DL (ref 6–23)
BUN SERPL-MCNC: 80 MG/DL (ref 6–23)
CALCIUM SERPL-MCNC: 8.5 MG/DL (ref 8.6–10.2)
CALCIUM SERPL-MCNC: 8.6 MG/DL (ref 8.6–10.2)
CALCIUM SERPL-MCNC: 8.7 MG/DL (ref 8.6–10.2)
CALCIUM SERPL-MCNC: 9 MG/DL (ref 8.6–10.2)
CHLORIDE SERPL-SCNC: 93 MMOL/L (ref 98–107)
CHLORIDE SERPL-SCNC: 94 MMOL/L (ref 98–107)
CO2 SERPL-SCNC: 17 MMOL/L (ref 22–29)
CO2 SERPL-SCNC: 20 MMOL/L (ref 22–29)
CO2 SERPL-SCNC: 20 MMOL/L (ref 22–29)
CO2 SERPL-SCNC: 22 MMOL/L (ref 22–29)
CREAT SERPL-MCNC: 2 MG/DL (ref 0.7–1.2)
CREAT SERPL-MCNC: 2.2 MG/DL (ref 0.7–1.2)
GFR, ESTIMATED: 29 ML/MIN/1.73M2
GFR, ESTIMATED: 30 ML/MIN/1.73M2
GFR, ESTIMATED: 30 ML/MIN/1.73M2
GFR, ESTIMATED: 32 ML/MIN/1.73M2
GLUCOSE BLD-MCNC: 121 MG/DL (ref 74–99)
GLUCOSE BLD-MCNC: 133 MG/DL (ref 74–99)
GLUCOSE BLD-MCNC: 208 MG/DL (ref 74–99)
GLUCOSE BLD-MCNC: 260 MG/DL (ref 74–99)
GLUCOSE BLD-MCNC: 271 MG/DL (ref 74–99)
GLUCOSE SERPL-MCNC: 111 MG/DL (ref 74–99)
GLUCOSE SERPL-MCNC: 130 MG/DL (ref 74–99)
GLUCOSE SERPL-MCNC: 206 MG/DL (ref 74–99)
GLUCOSE SERPL-MCNC: 247 MG/DL (ref 74–99)
INR PPP: 1.5
LDH SERPL-CCNC: 123 U/L (ref 135–225)
POTASSIUM SERPL-SCNC: 4.3 MMOL/L (ref 3.5–5)
POTASSIUM SERPL-SCNC: 4.9 MMOL/L (ref 3.5–5)
PROTHROMBIN TIME: 16.3 SEC (ref 9.3–12.4)
SODIUM SERPL-SCNC: 127 MMOL/L (ref 132–146)
SODIUM SERPL-SCNC: 129 MMOL/L (ref 132–146)
SODIUM SERPL-SCNC: 130 MMOL/L (ref 132–146)
SODIUM SERPL-SCNC: 132 MMOL/L (ref 132–146)

## 2025-04-03 PROCEDURE — 2500000003 HC RX 250 WO HCPCS: Performed by: STUDENT IN AN ORGANIZED HEALTH CARE EDUCATION/TRAINING PROGRAM

## 2025-04-03 PROCEDURE — 2580000003 HC RX 258

## 2025-04-03 PROCEDURE — 2140000000 HC CCU INTERMEDIATE R&B

## 2025-04-03 PROCEDURE — 36415 COLL VENOUS BLD VENIPUNCTURE: CPT

## 2025-04-03 PROCEDURE — 6370000000 HC RX 637 (ALT 250 FOR IP): Performed by: STUDENT IN AN ORGANIZED HEALTH CARE EDUCATION/TRAINING PROGRAM

## 2025-04-03 PROCEDURE — 94660 CPAP INITIATION&MGMT: CPT

## 2025-04-03 PROCEDURE — 94640 AIRWAY INHALATION TREATMENT: CPT

## 2025-04-03 PROCEDURE — 85610 PROTHROMBIN TIME: CPT

## 2025-04-03 PROCEDURE — 71045 X-RAY EXAM CHEST 1 VIEW: CPT

## 2025-04-03 PROCEDURE — 80048 BASIC METABOLIC PNL TOTAL CA: CPT

## 2025-04-03 PROCEDURE — 83615 LACTATE (LD) (LDH) ENZYME: CPT

## 2025-04-03 PROCEDURE — 99232 SBSQ HOSP IP/OBS MODERATE 35: CPT | Performed by: STUDENT IN AN ORGANIZED HEALTH CARE EDUCATION/TRAINING PROGRAM

## 2025-04-03 PROCEDURE — 82962 GLUCOSE BLOOD TEST: CPT

## 2025-04-03 RX ORDER — DOCUSATE SODIUM 100 MG/1
100 CAPSULE, LIQUID FILLED ORAL 2 TIMES DAILY
Status: DISCONTINUED | OUTPATIENT
Start: 2025-04-03 | End: 2025-04-08 | Stop reason: HOSPADM

## 2025-04-03 RX ORDER — POLYETHYLENE GLYCOL 3350 17 G/17G
17 POWDER, FOR SOLUTION ORAL DAILY
Status: DISCONTINUED | OUTPATIENT
Start: 2025-04-03 | End: 2025-04-08 | Stop reason: HOSPADM

## 2025-04-03 RX ADMIN — TIMOLOL MALEATE 1 DROP: 5 SOLUTION/ DROPS OPHTHALMIC at 20:45

## 2025-04-03 RX ADMIN — CARVEDILOL 12.5 MG: 6.25 TABLET, FILM COATED ORAL at 17:46

## 2025-04-03 RX ADMIN — SODIUM BICARBONATE 650 MG: 650 TABLET ORAL at 20:45

## 2025-04-03 RX ADMIN — IPRATROPIUM BROMIDE AND ALBUTEROL SULFATE 1 DOSE: 2.5; .5 SOLUTION RESPIRATORY (INHALATION) at 20:51

## 2025-04-03 RX ADMIN — IPRATROPIUM BROMIDE AND ALBUTEROL SULFATE 1 DOSE: 2.5; .5 SOLUTION RESPIRATORY (INHALATION) at 01:45

## 2025-04-03 RX ADMIN — SODIUM CHLORIDE, PRESERVATIVE FREE 10 ML: 5 INJECTION INTRAVENOUS at 20:47

## 2025-04-03 RX ADMIN — GENTAMICIN SULFATE: 1 OINTMENT TOPICAL at 09:29

## 2025-04-03 RX ADMIN — IPRATROPIUM BROMIDE AND ALBUTEROL SULFATE 1 DOSE: 2.5; .5 SOLUTION RESPIRATORY (INHALATION) at 09:45

## 2025-04-03 RX ADMIN — POLYETHYLENE GLYCOL 3350 17 G: 17 POWDER, FOR SOLUTION ORAL at 09:59

## 2025-04-03 RX ADMIN — SODIUM BICARBONATE 650 MG: 650 TABLET ORAL at 12:49

## 2025-04-03 RX ADMIN — BRIMONIDINE TARTRATE 1 DROP: 2 SOLUTION OPHTHALMIC at 20:46

## 2025-04-03 RX ADMIN — INSULIN LISPRO 2 UNITS: 100 INJECTION, SOLUTION INTRAVENOUS; SUBCUTANEOUS at 12:49

## 2025-04-03 RX ADMIN — BRIMONIDINE TARTRATE 1 DROP: 2 SOLUTION OPHTHALMIC at 09:07

## 2025-04-03 RX ADMIN — TIMOLOL MALEATE 1 DROP: 5 SOLUTION/ DROPS OPHTHALMIC at 09:07

## 2025-04-03 RX ADMIN — SODIUM CHLORIDE, PRESERVATIVE FREE 10 ML: 5 INJECTION INTRAVENOUS at 09:07

## 2025-04-03 RX ADMIN — DOCUSATE SODIUM 100 MG: 100 CAPSULE, LIQUID FILLED ORAL at 09:59

## 2025-04-03 RX ADMIN — INSULIN LISPRO 4 UNITS: 100 INJECTION, SOLUTION INTRAVENOUS; SUBCUTANEOUS at 20:55

## 2025-04-03 RX ADMIN — SODIUM BICARBONATE 650 MG: 650 TABLET ORAL at 17:46

## 2025-04-03 RX ADMIN — IPRATROPIUM BROMIDE AND ALBUTEROL SULFATE 1 DOSE: 2.5; .5 SOLUTION RESPIRATORY (INHALATION) at 16:50

## 2025-04-03 RX ADMIN — IPRATROPIUM BROMIDE AND ALBUTEROL SULFATE 1 DOSE: 2.5; .5 SOLUTION RESPIRATORY (INHALATION) at 13:19

## 2025-04-03 RX ADMIN — SODIUM CHLORIDE, SODIUM LACTATE, POTASSIUM CHLORIDE, AND CALCIUM CHLORIDE: .6; .31; .03; .02 INJECTION, SOLUTION INTRAVENOUS at 09:29

## 2025-04-03 RX ADMIN — INSULIN LISPRO 4 UNITS: 100 INJECTION, SOLUTION INTRAVENOUS; SUBCUTANEOUS at 17:45

## 2025-04-03 RX ADMIN — SODIUM BICARBONATE 650 MG: 650 TABLET ORAL at 09:07

## 2025-04-03 RX ADMIN — CARVEDILOL 12.5 MG: 6.25 TABLET, FILM COATED ORAL at 09:07

## 2025-04-03 RX ADMIN — PANTOPRAZOLE SODIUM 40 MG: 40 TABLET, DELAYED RELEASE ORAL at 09:07

## 2025-04-03 RX ADMIN — DOCUSATE SODIUM 100 MG: 100 CAPSULE, LIQUID FILLED ORAL at 20:45

## 2025-04-03 NOTE — CONSULTS
Palliative Care Department  [phone]  Palliative Care Initial Consult  Provider Rita De Anda, APRN - CNP     Aubrey Up  [id number]  Hospital Day: 2  Date of Initial Consult: 4/2/25  Referring Provider: Edison Bloom MD   Palliative Medicine was consulted for assistance with: Goals of care    HPI:   Aubrey Up is a 84 y.o. with a medical history of DM, HTN, CHF, CVA, previous STEMI who was admitted on 4/1/2025 from home with a CHIEF COMPLAINT of shortness of breath.  Patient arrived from [facility] on 4/1 with shortness of breath and weight gain.  ER workup revealed BUN/creatinine 90/2.8, sodium 123. CT A/P with pericardial effusion, small ascites, CXR with mild CHF.  He was transferred to [facility] for pericardial effusion.  Cardiology and nephrology consulted.  Podiatry also consulted for bilateral foot ulcers that he receives wound care per podiatry for weekly.  Palliative medicine consulted for further assistance with goals of care.    ASSESSMENT/PLAN:     Pertinent Hospital Diagnoses     Pericardial effusion  Acute on chronic CHF  MAMI on CKD  Hyponatremia  Bilateral foot ulcers      Palliative Care Encounter / Counseling Regarding Goals of Care  Please see detailed goals of care discussion as below  At this time, Aubrey Up, Does Not have capacity for medical decision-making.  Capacity is time limited and situation/question specific  During encounter Rosa Isela was surrogate medical decision-maker  Outcome of goals of care meeting:   Continue full code  Continue current medical management  Ultimate goal of returning home with [facility]  Code status Full Code  Advanced Directives: no POA or living will in epic-family to bring copies  Surrogate/Legal NOK:  Rosa Isela Up (spouse) [phone]  Aubrey Up  (child) [phone]      Spiritual assessment: no spiritual distress identified  Bereavement and grief: to be determined  Referrals to: none today  SUBJECTIVE:     Current medical issues 
Comprehensive Nutrition Assessment    Type and Reason for Visit:  Initial, Wound, Consult    Nutrition Recommendations/Plan:   Continue Current Diet, Start Oral Nutrition Supplement   gelatein 20 BID d/t current dry tray     Malnutrition Assessment:  Malnutrition Status:  Insufficient data (04/02/25 0300)    Context:  Acute Illness     Findings of the 6 clinical characteristics of malnutrition:  Energy Intake:  Mild decrease in energy intake  Weight Loss:  Unable to assess (d/t fluid shifts)     Body Fat Loss:  Unable to assess     Muscle Mass Loss:  Unable to assess    Fluid Accumulation:  No fluid accumulation     Strength:  Not Performed    Nutrition Assessment:    Pt admit as transfer from [facility] d/t pericardial effusion/SOB w/ noted subjective 20# wt gain x past couple weeks. Noted MAMI on CKD w/ hyponatremia, constipation & multiple full thickness wounds to B/L heels, R toe & OM to R foot per podiatry. PMHx DM, HLD, hx CVA, NSTEMI, GERD. Dry tray currently ordered. Will add appropriate ONS and continue to monitor.    Nutrition Related Findings:    pt alert, active BS, loss of appetite, constipation, BLE edema, fluids WDL, hyponatremia, hyperglycemia Wound Type: Multiple, Full Thickness       Current Nutrition Intake & Therapies:    Average Meal Intake: 51-75%  Average Supplements Intake: None Ordered  ADULT DIET; Regular; 5 carb choices (75 gm/meal); Low Fat/Low Chol/High Fiber/2 gm Na; No Fluids on Tray    Anthropometric Measures:  Height: 172.7 cm (5' 8\")  Ideal Body Weight (IBW): 154 lbs (70 kg)    Admission Body Weight: 94.8 kg (209 lb) (4/2 first measured)  Current Body Weight: 94.8 kg (209 lb) (4/2 bed scale), 135.7 % IBW.    Current BMI (kg/m2): 31.8  Usual Body Weight: 83 kg (183 lb) (10/2024 bed scale, per EMR)     % Weight Change (Calculated): 14.2                    BMI Categories: Obese Class 1 (BMI 30.0-34.9)    Estimated Daily Nutrient Needs:  Energy Requirements Based On: 
Department of Internal Medicine  Infectious Diseases   Consult Note      Reason for Consult: bilateral heel wounds, right heel osteomyelitis       Requesting Physician: Dr Milanes Marino         HISTORY OF PRESENT ILLNESS:                The patient is a 84 y.o. male \with hx of DM, peripheral neuropathy, asbestosis, sleep apnea, carotid stenosis ,CAD,  bilateral heel non healing wounds ( follows up with podiatrist ~ 3 months ) presented to the [facility] ER with shortness of breath, and abdomen bloating - denied fever or chills - CT scan of abdomen and pelvis  showed CHF, pericardial effusion - pt was transferred to [facility] for further management   WBC was 9.0 K  Sed rate was 120 ,    X ray ? Osteomyelitis   Pt's was treated with doxycycline , augmentin and levaquin out pt basis           Past Medical History:      Past Medical History:   Diagnosis Date    Acute respiratory failure (HCC)     CVA (cerebral vascular accident) (HCC)     Diabetes mellitus (HCC)     Type 2    Dysphagia     Gastroparesis     H/O asbestosis     History of paroxysmal supraventricular tachycardia 06/11/2019    Neuropathy     Prostate CA (HCC)     Sleep apnea with use of continuous positive airway pressure (CPAP)     TIA (transient ischemic attack)          Past Surgical History:    Past Surgical History:   Procedure Laterality Date    CERVICAL DISC SURGERY      CHOLECYSTECTOMY      KNEE ARTHROPLASTY      PROSTATE BIOPSY      UPPER GASTROINTESTINAL ENDOSCOPY N/A 8/6/2024    ESOPHAGOGASTRODUODENOSCOPY PERCUTANEOUS ENDOSCOPIC GASTROSTOMY TUBE PLACEMENT performed by Daryl Stokes MD at [facility] ENDOSCOPY         Current Medications:      Current Facility-Administered Medications   Medication Dose Route Frequency Provider Last Rate Last Admin    sulfur hexafluoride microspheres (LUMASON) 60.7-25 MG injection 2 mL  2 mL IntraVENous ONCE PRN Santy Marie DO        lactated ringers infusion   IntraVENous Marleni Henson, APRN - CNP 50 
Reason for Consult:  bilateral heel wounds    CHIEF COMPLAINT:  bilateral heel wounds    HISTORY OF PRESENT ILLNESS:                The patient is a 84 y.o. male with significant past medical history of type 2 diabetes mellitus, hypertension, history of CVA, previous NSTEMI who presents as consult podiatry service for bilateral heel ulcerations.  Patient follows outpatient with Dr. Hull and Dr. Gross for management of these heel wounds.  Patient is seen with family at bedside.  Son dictates to resident at this time that wound VAC to the right lower extremity started draining green substance.  Patient and family were notified that the wound VAC could be discontinued off the left lower extremity 2 weeks ago.  Patient also follows up with vascular surgery and Dr. Wong for carotid stenosis.  Patient also follows with Dr. Marie with cardiology.  Primary believed that patient is fluid overloaded and will need to assess while in house.  Patient denies any significant N/V/D/F/C/SOB/CP and has no other pedal complaints at this time.     Past Medical History:        Diagnosis Date    Acute respiratory failure (HCC)     CVA (cerebral vascular accident) (HCC)     Diabetes mellitus (HCC)     Type 2    Dysphagia     Gastroparesis     H/O asbestosis     History of paroxysmal supraventricular tachycardia 06/11/2019    Neuropathy     Prostate CA (HCC)     Sleep apnea with use of continuous positive airway pressure (CPAP)     TIA (transient ischemic attack)        Past Surgical History:        Procedure Laterality Date    CERVICAL DISC SURGERY      CHOLECYSTECTOMY      KNEE ARTHROPLASTY      PROSTATE BIOPSY      UPPER GASTROINTESTINAL ENDOSCOPY N/A 8/6/2024    ESOPHAGOGASTRODUODENOSCOPY PERCUTANEOUS ENDOSCOPIC GASTROSTOMY TUBE PLACEMENT performed by Daryl Stokes MD at [facility] ENDOSCOPY       Medications Prior to Admission:    Medications Prior to Admission: pantoprazole (PROTONIX) 40 MG tablet, Take 1 tablet by mouth 
y+), 30mcg/0.3mL 01/25/2021, 02/12/2021, 12/11/2021    DTaP, DAPTACEL, (age 6w-6y), IM, 0.5mL 09/13/2021    Influenza Vaccine, unspecified formulation 12/05/2017    Influenza Virus Vaccine 10/14/2008, 09/24/2009, 10/01/2010, 10/01/2011, 09/01/2013, 09/20/2015, 10/01/2016, 11/01/2018, 04/09/2019    Influenza, FLUAD, (age 65 y+), IM, Quadv, 0.5mL 10/18/2021, 09/29/2022    Influenza, FLUAD, (age 65 y+), IM, Trivalent PF, 0.5mL 09/12/2019    Influenza, FLUARIX, FLULAVAL, FLUZONE (age 6 mo+) and AFLURIA, (age 3 y+), Quadv PF, 0.5mL 04/09/2019    Influenza, FLUBLOK, (age 18 y+), IM, Trivalent PF, 0.5mL 10/29/2020, 10/18/2021    Influenza, FLUZONE High Dose (age 65 y+), IM, Quadv, 0.7mL 10/29/2020, 10/06/2023    Influenza, FLUZONE High Dose, (age 65 y+), IM, Trivalent PF, 0.5mL 10/23/2018, 10/29/2020, 10/18/2021    Pneumococcal, PCV-13, PREVNAR 13, (age 6w+), IM, 0.5mL 05/13/2013    Pneumococcal, PPSV23, PNEUMOVAX 23, (age 2y+), SC/IM, 0.5mL 05/13/2014, 09/13/2021    TD 5LF, TENIVAC, (age 7y+), IM, 0.5mL 09/13/2021    Td vaccine (adult) 08/21/2002    Zoster Recombinant (Shingrix) 03/13/2018, 05/21/2018        Home Meds: Medications Prior to Admission: pantoprazole (PROTONIX) 40 MG tablet, Take 1 tablet by mouth daily  insulin glargine (LANTUS) 100 UNIT/ML injection vial, Inject 14 Units into the skin nightly [facility]  INSULIN LISPRO SC, Inject into the skin every 6 hours Per sliding scale  brimonidine (ALPHAGAN) 0.2 % ophthalmic solution, INSTILL 1 DROP INTO THE RIGHT EYE TWICE A DAY  timolol (TIMOPTIC) 0.5 % ophthalmic solution,   aspirin 81 MG tablet, Take 1 tablet by mouth 2 times daily  atorvastatin (LIPITOR) 20 MG tablet, Take 2 tablets by mouth daily  carvedilol (COREG) 12.5 MG tablet, Take 1 tablet by mouth 2 times daily (with meals)  potassium chloride (KLOR-CON) 20 MEQ packet, Take 20 mEq by mouth daily  acetaminophen (TYLENOL) 325 MG tablet, 2 tablets by Per NG tube route every 6 
obtain venous pH, on sodium bicarbonate tablets  HFpEF 63%, proBNP 2555, repeat echo pending for evaluation of a pericardial effusion  HTN, on carvedilol  Type II DM, on SSI  CAD  BPH  Hyperlipidemia  ANIBAL  History of asbestos  History of pulmonary nodules  History of prostate cancer    Plan:    Start LR at 50 mL/h  Fluid restriction dry tray  Await echo results  Monitor renal function  Monitor sodium level  Monitor blood pressure    Thank you Dr. Milanes Marino for allowing us to participate in the care of Aubrey Up.      Electronically signed by NAZARIO Choi CNP on 4/2/2025 at 2:24 PM MD:  I saw and evaluated the patient, performing the key elements of the service. I discussed the findings, assessment and plan with NP and agree with her findings and plans as documented in her note.    
I have answered all questions as posed to me by Mr. Up's family members. Thank you, Dionne Singh DO for allowing me to consult in the care of this patient.    Santy Marie DO , FACP, FACC, [facility]    NOTE:  This report was transcribed using voice recognition software.  Every effort was made to ensure accuracy; however, inadvertent computerized transcription errors may be present.

## 2025-04-03 NOTE — PROCEDURES
1250 Spoke to patients RN regarding ordered thoracentesis.  Requested that ordering provider contacted to request  to continue to hold all blood thinning medication.  Verified that patient cannot sign own consent, however patients wife is at bedside and will come down with the patient.  All questions answered.  Will call floor to notify when sending for patient.

## 2025-04-04 ENCOUNTER — ANESTHESIA EVENT (OUTPATIENT)
Dept: OPERATING ROOM | Age: 85
End: 2025-04-04
Payer: MEDICARE

## 2025-04-04 ENCOUNTER — ANESTHESIA (OUTPATIENT)
Dept: OPERATING ROOM | Age: 85
End: 2025-04-04
Payer: MEDICARE

## 2025-04-04 ENCOUNTER — APPOINTMENT (OUTPATIENT)
Dept: INTERVENTIONAL RADIOLOGY/VASCULAR | Age: 85
DRG: 987 | End: 2025-04-04
Attending: FAMILY MEDICINE
Payer: MEDICARE

## 2025-04-04 ENCOUNTER — APPOINTMENT (OUTPATIENT)
Dept: GENERAL RADIOLOGY | Age: 85
DRG: 987 | End: 2025-04-04
Attending: FAMILY MEDICINE
Payer: MEDICARE

## 2025-04-04 LAB
ANION GAP SERPL CALCULATED.3IONS-SCNC: 13 MMOL/L (ref 7–16)
ANION GAP SERPL CALCULATED.3IONS-SCNC: 13 MMOL/L (ref 7–16)
ANION GAP SERPL CALCULATED.3IONS-SCNC: 14 MMOL/L (ref 7–16)
ANION GAP SERPL CALCULATED.3IONS-SCNC: 14 MMOL/L (ref 7–16)
APPEARANCE FLD: NORMAL
BODY FLD TYPE: NORMAL
BUN SERPL-MCNC: 71 MG/DL (ref 6–23)
BUN SERPL-MCNC: 73 MG/DL (ref 6–23)
BUN SERPL-MCNC: 73 MG/DL (ref 6–23)
BUN SERPL-MCNC: 76 MG/DL (ref 6–23)
CALCIUM SERPL-MCNC: 8.5 MG/DL (ref 8.6–10.2)
CALCIUM SERPL-MCNC: 8.7 MG/DL (ref 8.6–10.2)
CALCIUM SERPL-MCNC: 9 MG/DL (ref 8.6–10.2)
CALCIUM SERPL-MCNC: 9 MG/DL (ref 8.6–10.2)
CHLORIDE SERPL-SCNC: 93 MMOL/L (ref 98–107)
CHLORIDE SERPL-SCNC: 95 MMOL/L (ref 98–107)
CHLORIDE SERPL-SCNC: 95 MMOL/L (ref 98–107)
CHLORIDE SERPL-SCNC: 98 MMOL/L (ref 98–107)
CHOLESTEROL FLUID: 49 MG/DL
CLOT CHECK: NORMAL
CO2 SERPL-SCNC: 21 MMOL/L (ref 22–29)
CO2 SERPL-SCNC: 21 MMOL/L (ref 22–29)
CO2 SERPL-SCNC: 23 MMOL/L (ref 22–29)
CO2 SERPL-SCNC: 23 MMOL/L (ref 22–29)
COLOR FLD: YELLOW
CREAT SERPL-MCNC: 1.9 MG/DL (ref 0.7–1.2)
CREAT SERPL-MCNC: 2 MG/DL (ref 0.7–1.2)
GFR, ESTIMATED: 33 ML/MIN/1.73M2
GFR, ESTIMATED: 33 ML/MIN/1.73M2
GFR, ESTIMATED: 34 ML/MIN/1.73M2
GFR, ESTIMATED: 36 ML/MIN/1.73M2
GLUCOSE BLD-MCNC: 139 MG/DL (ref 74–99)
GLUCOSE BLD-MCNC: 151 MG/DL (ref 74–99)
GLUCOSE BLD-MCNC: 244 MG/DL (ref 74–99)
GLUCOSE FLD-MCNC: 126 MG/DL
GLUCOSE SERPL-MCNC: 124 MG/DL (ref 74–99)
GLUCOSE SERPL-MCNC: 175 MG/DL (ref 74–99)
GLUCOSE SERPL-MCNC: 195 MG/DL (ref 74–99)
GLUCOSE SERPL-MCNC: 200 MG/DL (ref 74–99)
LDH FLD L TO P-CCNC: 110 U/L
MONOCYTES NFR FLD: 39 %
NEUTROPHILS NFR FLD: 61 %
POTASSIUM SERPL-SCNC: 4.1 MMOL/L (ref 3.5–5)
POTASSIUM SERPL-SCNC: 4.5 MMOL/L (ref 3.5–5)
POTASSIUM SERPL-SCNC: 4.6 MMOL/L (ref 3.5–5)
POTASSIUM SERPL-SCNC: 4.7 MMOL/L (ref 3.5–5)
PROT FLD-MCNC: 3 G/DL
RBC # FLD: <2000 CELLS/UL
SODIUM SERPL-SCNC: 128 MMOL/L (ref 132–146)
SODIUM SERPL-SCNC: 130 MMOL/L (ref 132–146)
SODIUM SERPL-SCNC: 131 MMOL/L (ref 132–146)
SODIUM SERPL-SCNC: 134 MMOL/L (ref 132–146)
SPECIMEN TYPE: NORMAL
WBC # FLD: 139 CELLS/UL

## 2025-04-04 PROCEDURE — 0W9B3ZZ DRAINAGE OF LEFT PLEURAL CAVITY, PERCUTANEOUS APPROACH: ICD-10-PCS | Performed by: INTERNAL MEDICINE

## 2025-04-04 PROCEDURE — P9047 ALBUMIN (HUMAN), 25%, 50ML: HCPCS

## 2025-04-04 PROCEDURE — 6370000000 HC RX 637 (ALT 250 FOR IP): Performed by: STUDENT IN AN ORGANIZED HEALTH CARE EDUCATION/TRAINING PROGRAM

## 2025-04-04 PROCEDURE — 87205 SMEAR GRAM STAIN: CPT

## 2025-04-04 PROCEDURE — 82962 GLUCOSE BLOOD TEST: CPT

## 2025-04-04 PROCEDURE — 87077 CULTURE AEROBIC IDENTIFY: CPT

## 2025-04-04 PROCEDURE — 99232 SBSQ HOSP IP/OBS MODERATE 35: CPT | Performed by: STUDENT IN AN ORGANIZED HEALTH CARE EDUCATION/TRAINING PROGRAM

## 2025-04-04 PROCEDURE — 88112 CYTOPATH CELL ENHANCE TECH: CPT

## 2025-04-04 PROCEDURE — 87116 MYCOBACTERIA CULTURE: CPT

## 2025-04-04 PROCEDURE — 82945 GLUCOSE OTHER FLUID: CPT

## 2025-04-04 PROCEDURE — 36415 COLL VENOUS BLD VENIPUNCTURE: CPT

## 2025-04-04 PROCEDURE — 2580000003 HC RX 258

## 2025-04-04 PROCEDURE — 2709999900 HC NON-CHARGEABLE SUPPLY: Performed by: STUDENT IN AN ORGANIZED HEALTH CARE EDUCATION/TRAINING PROGRAM

## 2025-04-04 PROCEDURE — 84157 ASSAY OF PROTEIN OTHER: CPT

## 2025-04-04 PROCEDURE — 87206 SMEAR FLUORESCENT/ACID STAI: CPT

## 2025-04-04 PROCEDURE — 2140000000 HC CCU INTERMEDIATE R&B

## 2025-04-04 PROCEDURE — 87075 CULTR BACTERIA EXCEPT BLOOD: CPT

## 2025-04-04 PROCEDURE — 89051 BODY FLUID CELL COUNT: CPT

## 2025-04-04 PROCEDURE — 87015 SPECIMEN INFECT AGNT CONCNTJ: CPT

## 2025-04-04 PROCEDURE — 0QBM0ZX EXCISION OF LEFT TARSAL, OPEN APPROACH, DIAGNOSTIC: ICD-10-PCS | Performed by: INTERNAL MEDICINE

## 2025-04-04 PROCEDURE — 3600000012 HC SURGERY LEVEL 2 ADDTL 15MIN: Performed by: STUDENT IN AN ORGANIZED HEALTH CARE EDUCATION/TRAINING PROGRAM

## 2025-04-04 PROCEDURE — 94660 CPAP INITIATION&MGMT: CPT

## 2025-04-04 PROCEDURE — 7100000000 HC PACU RECOVERY - FIRST 15 MIN: Performed by: STUDENT IN AN ORGANIZED HEALTH CARE EDUCATION/TRAINING PROGRAM

## 2025-04-04 PROCEDURE — 94640 AIRWAY INHALATION TREATMENT: CPT

## 2025-04-04 PROCEDURE — 6360000002 HC RX W HCPCS: Performed by: STUDENT IN AN ORGANIZED HEALTH CARE EDUCATION/TRAINING PROGRAM

## 2025-04-04 PROCEDURE — 87102 FUNGUS ISOLATION CULTURE: CPT

## 2025-04-04 PROCEDURE — 71045 X-RAY EXAM CHEST 1 VIEW: CPT

## 2025-04-04 PROCEDURE — 2500000003 HC RX 250 WO HCPCS: Performed by: STUDENT IN AN ORGANIZED HEALTH CARE EDUCATION/TRAINING PROGRAM

## 2025-04-04 PROCEDURE — 6360000002 HC RX W HCPCS

## 2025-04-04 PROCEDURE — 83986 ASSAY PH BODY FLUID NOS: CPT

## 2025-04-04 PROCEDURE — 82465 ASSAY BLD/SERUM CHOLESTEROL: CPT

## 2025-04-04 PROCEDURE — 3700000001 HC ADD 15 MINUTES (ANESTHESIA): Performed by: STUDENT IN AN ORGANIZED HEALTH CARE EDUCATION/TRAINING PROGRAM

## 2025-04-04 PROCEDURE — C1729 CATH, DRAINAGE: HCPCS

## 2025-04-04 PROCEDURE — 88305 TISSUE EXAM BY PATHOLOGIST: CPT

## 2025-04-04 PROCEDURE — 0QBL0ZX EXCISION OF RIGHT TARSAL, OPEN APPROACH, DIAGNOSTIC: ICD-10-PCS | Performed by: INTERNAL MEDICINE

## 2025-04-04 PROCEDURE — 87070 CULTURE OTHR SPECIMN AEROBIC: CPT

## 2025-04-04 PROCEDURE — 7100000001 HC PACU RECOVERY - ADDTL 15 MIN: Performed by: STUDENT IN AN ORGANIZED HEALTH CARE EDUCATION/TRAINING PROGRAM

## 2025-04-04 PROCEDURE — 3700000000 HC ANESTHESIA ATTENDED CARE: Performed by: STUDENT IN AN ORGANIZED HEALTH CARE EDUCATION/TRAINING PROGRAM

## 2025-04-04 PROCEDURE — 32555 ASPIRATE PLEURA W/ IMAGING: CPT

## 2025-04-04 PROCEDURE — 6360000002 HC RX W HCPCS: Performed by: PHYSICIAN ASSISTANT

## 2025-04-04 PROCEDURE — 3600000002 HC SURGERY LEVEL 2 BASE: Performed by: STUDENT IN AN ORGANIZED HEALTH CARE EDUCATION/TRAINING PROGRAM

## 2025-04-04 PROCEDURE — 2500000003 HC RX 250 WO HCPCS

## 2025-04-04 PROCEDURE — 83615 LACTATE (LD) (LDH) ENZYME: CPT

## 2025-04-04 PROCEDURE — 87176 TISSUE HOMOGENIZATION CULTR: CPT

## 2025-04-04 PROCEDURE — 80048 BASIC METABOLIC PNL TOTAL CA: CPT

## 2025-04-04 DEVICE — FISH-SKIN GRAFT FOR SURGICAL USE. KERECIS® SURGICLOSE® IS INDICATED FOR THE MANAGEMENT OF WOUNDS INCLUDING: PARTIAL AND FULL THICKNESS WOUNDS, PRESSURE ULCERS, CHRONIC VASCULAR ULCERS, DIABETIC ULCERS, TRAUMA WOUNDS (ABRASIONS, LACERATIONS, SECOND-DEGREE BURNS, SKIN TEARS), SURGICAL WOUNDS (DONOR SITE/GRAFTS, POST-MOHS SURGERY, POST-LASER SURGERY, PODIATRIC, WOUND DEHISCENCE) AND DRAINING WOUNDS.IFU: HTTPS://WWW.KERECIS.COM/IFUS/IFU-KERECIS-SURGICLOSE/
Type: IMPLANTABLE DEVICE | Site: HEEL | Status: FUNCTIONAL
Brand: KERECIS® SURGICLOSE®

## 2025-04-04 RX ORDER — CEFAZOLIN SODIUM 1 G/3ML
INJECTION, POWDER, FOR SOLUTION INTRAMUSCULAR; INTRAVENOUS
Status: DISCONTINUED | OUTPATIENT
Start: 2025-04-04 | End: 2025-04-04 | Stop reason: SDUPTHER

## 2025-04-04 RX ORDER — BUPIVACAINE HYDROCHLORIDE 5 MG/ML
INJECTION, SOLUTION EPIDURAL; INTRACAUDAL; PERINEURAL PRN
Status: DISCONTINUED | OUTPATIENT
Start: 2025-04-04 | End: 2025-04-04 | Stop reason: HOSPADM

## 2025-04-04 RX ORDER — VANCOMYCIN HYDROCHLORIDE 1 G/20ML
INJECTION, POWDER, LYOPHILIZED, FOR SOLUTION INTRAVENOUS PRN
Status: DISCONTINUED | OUTPATIENT
Start: 2025-04-04 | End: 2025-04-04 | Stop reason: HOSPADM

## 2025-04-04 RX ORDER — LIDOCAINE HYDROCHLORIDE 20 MG/ML
INJECTION, SOLUTION INFILTRATION; PERINEURAL PRN
Status: COMPLETED | OUTPATIENT
Start: 2025-04-04 | End: 2025-04-04

## 2025-04-04 RX ORDER — ALBUMIN (HUMAN) 12.5 G/50ML
SOLUTION INTRAVENOUS
Status: DISCONTINUED | OUTPATIENT
Start: 2025-04-04 | End: 2025-04-04 | Stop reason: SDUPTHER

## 2025-04-04 RX ORDER — ACETAMINOPHEN 650 MG
TABLET, EXTENDED RELEASE ORAL PRN
Status: DISCONTINUED | OUTPATIENT
Start: 2025-04-04 | End: 2025-04-04 | Stop reason: HOSPADM

## 2025-04-04 RX ORDER — DEXMEDETOMIDINE HYDROCHLORIDE 100 UG/ML
INJECTION, SOLUTION INTRAVENOUS
Status: DISCONTINUED | OUTPATIENT
Start: 2025-04-04 | End: 2025-04-04 | Stop reason: SDUPTHER

## 2025-04-04 RX ORDER — SODIUM CHLORIDE 9 MG/ML
INJECTION, SOLUTION INTRAVENOUS
Status: DISCONTINUED | OUTPATIENT
Start: 2025-04-04 | End: 2025-04-04 | Stop reason: SDUPTHER

## 2025-04-04 RX ADMIN — PANTOPRAZOLE SODIUM 40 MG: 40 TABLET, DELAYED RELEASE ORAL at 11:09

## 2025-04-04 RX ADMIN — SODIUM CHLORIDE, PRESERVATIVE FREE 5 ML: 5 INJECTION INTRAVENOUS at 19:58

## 2025-04-04 RX ADMIN — BRIMONIDINE TARTRATE 1 DROP: 2 SOLUTION OPHTHALMIC at 11:10

## 2025-04-04 RX ADMIN — IPRATROPIUM BROMIDE AND ALBUTEROL SULFATE 1 DOSE: 2.5; .5 SOLUTION RESPIRATORY (INHALATION) at 00:28

## 2025-04-04 RX ADMIN — DOCUSATE SODIUM 100 MG: 100 CAPSULE, LIQUID FILLED ORAL at 11:09

## 2025-04-04 RX ADMIN — INSULIN LISPRO 2 UNITS: 100 INJECTION, SOLUTION INTRAVENOUS; SUBCUTANEOUS at 21:02

## 2025-04-04 RX ADMIN — BRIMONIDINE TARTRATE 1 DROP: 2 SOLUTION OPHTHALMIC at 21:11

## 2025-04-04 RX ADMIN — IPRATROPIUM BROMIDE AND ALBUTEROL SULFATE 1 DOSE: 2.5; .5 SOLUTION RESPIRATORY (INHALATION) at 11:47

## 2025-04-04 RX ADMIN — SODIUM BICARBONATE 650 MG: 650 TABLET ORAL at 11:09

## 2025-04-04 RX ADMIN — LIDOCAINE HYDROCHLORIDE 10 ML: 20 INJECTION, SOLUTION INFILTRATION; PERINEURAL at 09:19

## 2025-04-04 RX ADMIN — TIMOLOL MALEATE 1 DROP: 5 SOLUTION/ DROPS OPHTHALMIC at 21:09

## 2025-04-04 RX ADMIN — TIMOLOL MALEATE 1 DROP: 5 SOLUTION/ DROPS OPHTHALMIC at 11:10

## 2025-04-04 RX ADMIN — DOCUSATE SODIUM 100 MG: 100 CAPSULE, LIQUID FILLED ORAL at 21:11

## 2025-04-04 RX ADMIN — SODIUM CHLORIDE: 9 INJECTION, SOLUTION INTRAVENOUS at 17:29

## 2025-04-04 RX ADMIN — DEXMEDETOMIDINE HCL 20 MCG: 100 INJECTION INTRAVENOUS at 17:32

## 2025-04-04 RX ADMIN — IPRATROPIUM BROMIDE AND ALBUTEROL SULFATE 1 DOSE: 2.5; .5 SOLUTION RESPIRATORY (INHALATION) at 07:55

## 2025-04-04 RX ADMIN — CEFAZOLIN 2 G: 1 INJECTION, POWDER, FOR SOLUTION INTRAMUSCULAR; INTRAVENOUS at 17:35

## 2025-04-04 RX ADMIN — POLYETHYLENE GLYCOL 3350 17 G: 17 POWDER, FOR SOLUTION ORAL at 11:09

## 2025-04-04 RX ADMIN — IPRATROPIUM BROMIDE AND ALBUTEROL SULFATE 1 DOSE: 2.5; .5 SOLUTION RESPIRATORY (INHALATION) at 21:13

## 2025-04-04 RX ADMIN — ALBUMIN (HUMAN) 25 G: 0.25 INJECTION, SOLUTION INTRAVENOUS at 17:37

## 2025-04-04 RX ADMIN — SODIUM BICARBONATE 650 MG: 650 TABLET ORAL at 21:11

## 2025-04-04 ASSESSMENT — PAIN - FUNCTIONAL ASSESSMENT: PAIN_FUNCTIONAL_ASSESSMENT: NONE - DENIES PAIN

## 2025-04-04 ASSESSMENT — LIFESTYLE VARIABLES: SMOKING_STATUS: 0

## 2025-04-04 NOTE — BRIEF OP NOTE
Brief-Op Note  ____________________________________________________________      IR  U/S GUIDED THORACENTESIS  [facility] 6WE IMCU    Patient Name: Aubrey Up   YOB: 1940  Medical Record Number: [id number]  Date of Procedure: 4/4/25  Room/Bed: [address] [place] [address]    Preoperative diagnosis: left Pleural Effusion    Postoperative diagnosis: Same    Consent: The patient was counseled regarding the procedure, it's indications, risks, potential complications and alternatives and any questions were answered.     Procedure:  Image Guided left Thoracentesis.    Anesthesia: Local anesthesia.    Performed by: JYOTSNA Walls under on-site supervision by Annie Garcia MD.    Estimated blood loss: Less than 10 mL    Complications: None    Specimen Obtained: A total volume of  550mL was withdrawn    Electronically signed by JYOTSNA Walls   DD: 4/4/25  9:51 AM

## 2025-04-04 NOTE — OP NOTE
Operative Note      Patient: Aubrey Up  YOB: 1940  MRN: [id number]    Date of Procedure: 4/4/2025    Preprocedure diagnosis:  Wounds, bilateral feet  Osteomyelitis, bilateral feet    Post-Op Diagnosis: Same       Procedure:  1.  Incision and drainage with bone biopsy, right heel  2.  Incision and drainage with bone biopsy, left heel  3. Incision and drainage with bone biopsy, right distal great toe  4.  Application of skin substitute, bilateral feet  5.  Application of wound VAC, right foot    Surgeon:  Moiz Gross D.P.M., AACFAS    Assistant:   Resident: Santy Hernandez, PEDRO PGY 2  Jeff Michel D.P.M., PGY 1    Anesthesia: Monitor Anesthesia Care    Estimated Blood Loss (mL): 20 mL    Complications: None    Specimens:   ID Type Source Tests Collected by Time Destination   1 : SOFT TISSUE RIGHT GREAT TOE FOR CULTURE Tissue Tissue CULTURE, ANAEROBIC, CULTURE, FUNGUS, GRAM STAIN, CULTURE, SURGICAL, CULTURE WITH SMEAR, ACID FAST BACILLIUS Moiz Gross DPM 4/4/2025 1758    2 : LEFT HEEL SOFT TISSUE Tissue Tissue CULTURE, ANAEROBIC, CULTURE, FUNGUS, GRAM STAIN, CULTURE, SURGICAL, CULTURE WITH SMEAR, ACID FAST Moiz Sheldon DPM 4/4/2025 1811    3 : RIGHT HEEL SOFT TISSUE FOR CULTURE Tissue Tissue CULTURE, ANAEROBIC, CULTURE, FUNGUS, GRAM STAIN, CULTURE, SURGICAL, CULTURE WITH SMEAR, ACID FAST BACILLIUS Moiz Gross DPM 4/4/2025 1828    A : LEFT HEEL SOFT TISSUE Tissue Tissue SURGICAL PATHOLOGY Moiz Gross DPM 4/4/2025 1801    B : RIGHT GREAT TOE BONE FOR PATHOLOGY Tissue Tissue SURGICAL PATHOLOGY Moiz Gross DPM 4/4/2025 1829        Implants:  * No implants in log *      Drains: * No LDAs found *    Findings:  Infection Present At Time Of Surgery (PATOS) (choose all levels that have infection present):  No infection present  Other Findings: Bilateral foot wounds    Indications for procedure:  This is a 84-year-old male who was been seen in our office for the

## 2025-04-04 NOTE — ANESTHESIA PRE PROCEDURE
Department of Anesthesiology  Preprocedure Note       Name:  Aubrey Up   Age:  84 y.o.  :  1940                                          MRN:  [id number]         Date:  2025      Surgeon: Surgeon(s):  Johnathan Hull DPM King, Bennett J, DPM    Procedure: Procedure(s):  BILATERAL LOWER EXTREMITY INCISION AND DRAINAGE, POSSIBLE BONE BIOPSY, POSSIBLE SKIN GRAFT APPLICATION - WANTS 1PM    Medications prior to admission:   Prior to Admission medications    Medication Sig Start Date End Date Taking? Authorizing Provider   pantoprazole (PROTONIX) 40 MG tablet Take 1 tablet by mouth daily   Yes Selene Quinteros MD   insulin glargine (LANTUS) 100 UNIT/ML injection vial Inject 14 Units into the skin nightly [facility] dose [facility] per family   Yes Selene Quinteros MD   INSULIN LISPRO SC Inject into the skin every 6 hours Per sliding scale   Yes Selene Quinteros MD   brimonidine (ALPHAGAN) 0.2 % ophthalmic solution INSTILL 1 DROP INTO THE RIGHT EYE TWICE A DAY 3/3/21  Yes Selene Quinteros MD   timolol (TIMOPTIC) 0.5 % ophthalmic solution  19  Yes Selene Quinteros MD   aspirin 81 MG tablet Take 1 tablet by mouth 2 times daily   Yes Selene Quinteros MD   atorvastatin (LIPITOR) 20 MG tablet Take 2 tablets by mouth daily 10/23/24   Edison Bloom MD   carvedilol (COREG) 12.5 MG tablet Take 1 tablet by mouth 2 times daily (with meals) 10/23/24   Edison Bloom MD   potassium chloride (KLOR-CON) 20 MEQ packet Take 20 mEq by mouth daily    Selene Quinteros MD   acetaminophen (TYLENOL) 325 MG tablet 2 tablets by Per NG tube route every 6 hours as needed for Pain or Fever    Selene Quinteros MD   ipratropium 0.5 mg-albuterol 2.5 mg (DUONEB) 0.5-2.5 (3) MG/3ML SOLN nebulizer solution Take 3 mLs by nebulization every 4 hours    Selene Quinteros MD   Insulin Regular Human (HUMULIN R IJ) Inject 4 Units as directed every 6 hours    Selene Quinteros MD

## 2025-04-04 NOTE — PROCEDURES
PROCEDURE NOTE  Date: 4/4/2025   Name: Aubrey Up  YOB: 1940    Procedures: Left thoracentesis    Spoke with bedside RN. IR sending for patient now to complete Left thoracentesis.

## 2025-04-04 NOTE — BRIEF OP NOTE
Brief Postoperative Note  Patient: Aubrey Up  YOB: 1940  MRN: [id number]     Date of Procedure: 4/4/2025     Preprocedure diagnosis:  Wounds, bilateral feet  Osteomyelitis, bilateral feet     Post-Op Diagnosis: Same       Procedure:  1.  Incision and drainage with bone biopsy, right heel  2.  Incision and drainage with bone biopsy, left heel  3. Incision and drainage with bone biopsy, right distal great toe  4.  Application of skin substitute, bilateral feet  5.  Application of wound VAC, right foot     Surgeon:  Moiz Gross D.P.M., AACFAS     Assistant:   Resident: Santy Hernandez, PEDRO PGY 2  Jeff Michel D.P.M., PGY 1     Anesthesia: Monitor Anesthesia Care     Estimated Blood Loss (mL): 20 mL     Complications: None     Specimens:   ID Type Source Tests Collected by Time Destination   1 : SOFT TISSUE RIGHT GREAT TOE FOR CULTURE Tissue Tissue CULTURE, ANAEROBIC, CULTURE, FUNGUS, GRAM STAIN, CULTURE, SURGICAL, CULTURE WITH SMEAR, ACID FAST BACILLIUS Moiz Gross DPM 4/4/2025 1758     2 : LEFT HEEL SOFT TISSUE Tissue Tissue CULTURE, ANAEROBIC, CULTURE, FUNGUS, GRAM STAIN, CULTURE, SURGICAL, CULTURE WITH SMEAR, ACID FAST Moiz Sheldon DPM 4/4/2025 1811     3 : RIGHT HEEL SOFT TISSUE FOR CULTURE Tissue Tissue CULTURE, ANAEROBIC, CULTURE, FUNGUS, GRAM STAIN, CULTURE, SURGICAL, CULTURE WITH SMEAR, ACID FAST BACILLIUS Moiz Gross DPM 4/4/2025 1828     A : LEFT HEEL SOFT TISSUE Tissue Tissue SURGICAL PATHOLOGY Moiz Gross DPM 4/4/2025 1801     B : RIGHT GREAT TOE BONE FOR PATHOLOGY Tissue Tissue SURGICAL PATHOLOGY Moiz Gross DPM 4/4/2025 1829           Implants:  * No implants in log *      Drains: * No LDAs found *     Findings:  Infection Present At Time Of Surgery (PATOS) (choose all levels that have infection present):  No infection present  Other Findings: Bilateral foot wounds

## 2025-04-04 NOTE — OP NOTE
Operative Note  ____________________________________________________________      IR U/S GUIDED THORACENTESIS  [facility] 6WE IMCU    Patient Name: Aubrey Up   YOB: 1940  Medical Record Number: [id number]  Date of Procedure: 4/4/25  Room/Bed: [address] [place] [address]-A    Preoperative diagnosis: left Pleural Effusion    Postoperative diagnosis: Same    Consent: The patient was counseled regarding the procedure, it's indications, risks, potential complications and alternatives and any questions were answered. Consent was obtained for image guided left thoracentesis for Pleural effusion.     Procedure:  Image Guided left Thoracentesis.    Performed by: JYOTSNA Walls under on-site supervision by Annie Garcia MD.    Anesthesia: Local    Estimated blood loss: Less than 10 mL    Complications: None    Specimen Obtained: Pleural Fluid    Procedure: Prior to start of procedure, routine scanning of the posterior thorax was performed using real-time ultrasound and revealed sufficient amount of pleural fluid present. Decision was made to proceed with procedure.  After obtaining consent, a \"Time-out\" was called to verify the correct patient, procedure/location, allergies, relevant medications held for procedure and that all equipment is functioning and available. The patient was then situated in a seated upright position and the appropriate landmarks were identified using ultrasound. The site of maximum fluid collection was marked. The skin over the puncture site in the left lower posterior hemithorax region was prepped with surgical skin prep and draped in a sterile fashion. Local anesthesia was administered by infiltration using 2% Lidocaine without epinephrine administered subcutaneously.  A 8 Swazi safe-t-centesis catheter was then advanced into the pleural cavity and the needle was withdrawn.  Pleural fluid return was clear straw colored. The catheter was then withdrawn and a sterile dressing was placed over the

## 2025-04-05 LAB
ANION GAP SERPL CALCULATED.3IONS-SCNC: 15 MMOL/L (ref 7–16)
BUN SERPL-MCNC: 69 MG/DL (ref 6–23)
CALCIUM SERPL-MCNC: 8.9 MG/DL (ref 8.6–10.2)
CHLORIDE SERPL-SCNC: 94 MMOL/L (ref 98–107)
CO2 SERPL-SCNC: 21 MMOL/L (ref 22–29)
CREAT SERPL-MCNC: 1.8 MG/DL (ref 0.7–1.2)
DATE LAST DOSE: ABNORMAL
GFR, ESTIMATED: 37 ML/MIN/1.73M2
GLUCOSE BLD-MCNC: 175 MG/DL (ref 74–99)
GLUCOSE BLD-MCNC: 245 MG/DL (ref 74–99)
GLUCOSE BLD-MCNC: 251 MG/DL (ref 74–99)
GLUCOSE BLD-MCNC: 257 MG/DL (ref 74–99)
GLUCOSE SERPL-MCNC: 204 MG/DL (ref 74–99)
POTASSIUM SERPL-SCNC: 4.6 MMOL/L (ref 3.5–5)
REPORT STATUS: NORMAL
SODIUM SERPL-SCNC: 130 MMOL/L (ref 132–146)
TME LAST DOSE: ABNORMAL H
VANCOMYCIN DOSE: ABNORMAL MG
VANCOMYCIN SERPL-MCNC: <4 UG/ML (ref 5–40)

## 2025-04-05 PROCEDURE — 6370000000 HC RX 637 (ALT 250 FOR IP): Performed by: STUDENT IN AN ORGANIZED HEALTH CARE EDUCATION/TRAINING PROGRAM

## 2025-04-05 PROCEDURE — 80048 BASIC METABOLIC PNL TOTAL CA: CPT

## 2025-04-05 PROCEDURE — 94640 AIRWAY INHALATION TREATMENT: CPT

## 2025-04-05 PROCEDURE — 6360000002 HC RX W HCPCS: Performed by: STUDENT IN AN ORGANIZED HEALTH CARE EDUCATION/TRAINING PROGRAM

## 2025-04-05 PROCEDURE — 51701 INSERT BLADDER CATHETER: CPT

## 2025-04-05 PROCEDURE — 82962 GLUCOSE BLOOD TEST: CPT

## 2025-04-05 PROCEDURE — 2500000003 HC RX 250 WO HCPCS: Performed by: STUDENT IN AN ORGANIZED HEALTH CARE EDUCATION/TRAINING PROGRAM

## 2025-04-05 PROCEDURE — 94660 CPAP INITIATION&MGMT: CPT

## 2025-04-05 PROCEDURE — 51798 US URINE CAPACITY MEASURE: CPT

## 2025-04-05 PROCEDURE — 2580000003 HC RX 258: Performed by: STUDENT IN AN ORGANIZED HEALTH CARE EDUCATION/TRAINING PROGRAM

## 2025-04-05 PROCEDURE — 80202 ASSAY OF VANCOMYCIN: CPT

## 2025-04-05 PROCEDURE — 99232 SBSQ HOSP IP/OBS MODERATE 35: CPT | Performed by: STUDENT IN AN ORGANIZED HEALTH CARE EDUCATION/TRAINING PROGRAM

## 2025-04-05 PROCEDURE — 2140000000 HC CCU INTERMEDIATE R&B

## 2025-04-05 PROCEDURE — 36415 COLL VENOUS BLD VENIPUNCTURE: CPT

## 2025-04-05 RX ORDER — IPRATROPIUM BROMIDE AND ALBUTEROL SULFATE 2.5; .5 MG/3ML; MG/3ML
1 SOLUTION RESPIRATORY (INHALATION) EVERY 4 HOURS PRN
Status: DISCONTINUED | OUTPATIENT
Start: 2025-04-05 | End: 2025-04-08 | Stop reason: HOSPADM

## 2025-04-05 RX ORDER — BISACODYL 10 MG
10 SUPPOSITORY, RECTAL RECTAL DAILY PRN
Status: DISCONTINUED | OUTPATIENT
Start: 2025-04-05 | End: 2025-04-08 | Stop reason: HOSPADM

## 2025-04-05 RX ADMIN — PANTOPRAZOLE SODIUM 40 MG: 40 TABLET, DELAYED RELEASE ORAL at 09:12

## 2025-04-05 RX ADMIN — INSULIN LISPRO 2 UNITS: 100 INJECTION, SOLUTION INTRAVENOUS; SUBCUTANEOUS at 17:31

## 2025-04-05 RX ADMIN — BRIMONIDINE TARTRATE 1 DROP: 2 SOLUTION OPHTHALMIC at 20:17

## 2025-04-05 RX ADMIN — BISACODYL 10 MG: 10 SUPPOSITORY RECTAL at 17:54

## 2025-04-05 RX ADMIN — POLYETHYLENE GLYCOL 3350 17 G: 17 POWDER, FOR SOLUTION ORAL at 09:12

## 2025-04-05 RX ADMIN — CEFEPIME 2000 MG: 2 INJECTION, POWDER, FOR SOLUTION INTRAVENOUS at 09:33

## 2025-04-05 RX ADMIN — IPRATROPIUM BROMIDE AND ALBUTEROL SULFATE 1 DOSE: 2.5; .5 SOLUTION RESPIRATORY (INHALATION) at 00:33

## 2025-04-05 RX ADMIN — INSULIN LISPRO 4 UNITS: 100 INJECTION, SOLUTION INTRAVENOUS; SUBCUTANEOUS at 12:39

## 2025-04-05 RX ADMIN — SODIUM BICARBONATE 650 MG: 650 TABLET ORAL at 09:12

## 2025-04-05 RX ADMIN — TIMOLOL MALEATE 1 DROP: 5 SOLUTION/ DROPS OPHTHALMIC at 09:12

## 2025-04-05 RX ADMIN — Medication 1750 MG: at 20:21

## 2025-04-05 RX ADMIN — INSULIN LISPRO 4 UNITS: 100 INJECTION, SOLUTION INTRAVENOUS; SUBCUTANEOUS at 20:20

## 2025-04-05 RX ADMIN — HEPARIN SODIUM 5000 UNITS: 5000 INJECTION INTRAVENOUS; SUBCUTANEOUS at 15:30

## 2025-04-05 RX ADMIN — INSULIN GLARGINE 14 UNITS: 100 INJECTION, SOLUTION SUBCUTANEOUS at 20:18

## 2025-04-05 RX ADMIN — HEPARIN SODIUM 5000 UNITS: 5000 INJECTION INTRAVENOUS; SUBCUTANEOUS at 20:22

## 2025-04-05 RX ADMIN — SODIUM BICARBONATE 650 MG: 650 TABLET ORAL at 20:17

## 2025-04-05 RX ADMIN — IPRATROPIUM BROMIDE AND ALBUTEROL SULFATE 1 DOSE: 2.5; .5 SOLUTION RESPIRATORY (INHALATION) at 04:11

## 2025-04-05 RX ADMIN — CARVEDILOL 12.5 MG: 6.25 TABLET, FILM COATED ORAL at 09:12

## 2025-04-05 RX ADMIN — DOCUSATE SODIUM 100 MG: 100 CAPSULE, LIQUID FILLED ORAL at 20:17

## 2025-04-05 RX ADMIN — DOCUSATE SODIUM 100 MG: 100 CAPSULE, LIQUID FILLED ORAL at 09:12

## 2025-04-05 RX ADMIN — IPRATROPIUM BROMIDE AND ALBUTEROL SULFATE 1 DOSE: 2.5; .5 SOLUTION RESPIRATORY (INHALATION) at 21:33

## 2025-04-05 RX ADMIN — TIMOLOL MALEATE 1 DROP: 5 SOLUTION/ DROPS OPHTHALMIC at 20:17

## 2025-04-05 RX ADMIN — SODIUM CHLORIDE, PRESERVATIVE FREE 10 ML: 5 INJECTION INTRAVENOUS at 09:17

## 2025-04-05 RX ADMIN — BRIMONIDINE TARTRATE 1 DROP: 2 SOLUTION OPHTHALMIC at 09:12

## 2025-04-05 RX ADMIN — SODIUM BICARBONATE 650 MG: 650 TABLET ORAL at 12:39

## 2025-04-05 RX ADMIN — SODIUM CHLORIDE, PRESERVATIVE FREE 5 ML: 5 INJECTION INTRAVENOUS at 19:25

## 2025-04-05 RX ADMIN — CARVEDILOL 12.5 MG: 6.25 TABLET, FILM COATED ORAL at 17:31

## 2025-04-05 RX ADMIN — SODIUM BICARBONATE 650 MG: 650 TABLET ORAL at 17:31

## 2025-04-05 NOTE — ANESTHESIA POSTPROCEDURE EVALUATION
Department of Anesthesiology  Postprocedure Note    Patient: Aubrey Up  MRN: [id number]  YOB: 1940  Date of evaluation: 4/4/2025    Procedure Summary       Date: 04/04/25 Room / Location: [address]    Anesthesia Start: 1729 Anesthesia Stop: 1827    Procedure: BILATERAL LOWER EXTREMITY INCISION AND DRAINAGE, BONE BIOPSY, SKIN GRAFT APPLICATION, WOUND VAC APPLICATION (Bilateral: Leg Lower) Diagnosis:       Osteomyelitis of both feet (HCC)      (Osteomyelitis of both feet (HCC) [M86.9])    Surgeons: Moiz Gross DPM Responsible Provider: Citlaly Walton MD    Anesthesia Type: MAC ASA Status: 4            Anesthesia Type: MAC    Johanna Phase I: Johanna Score: 10    Johanna Phase II:      Anesthesia Post Evaluation    Patient location during evaluation: PACU  Patient participation: complete - patient participated  Level of consciousness: awake  Airway patency: patent  Nausea & Vomiting: no nausea and no vomiting  Cardiovascular status: hemodynamically stable  Respiratory status: acceptable  Hydration status: euvolemic  Pain management: adequate        No notable events documented.

## 2025-04-05 NOTE — RT PROTOCOL NOTE
RT Nebulizer Bronchodilator Protocol Note    There is a bronchodilator order in the chart from a provider indicating to follow the RT Bronchodilator Protocol and there is an “Initiate RT Bronchodilator Protocol” order as well (see protocol at bottom of note).    CXR Findings:  XR CHEST PORTABLE  Result Date: 4/4/2025  1. Significant interval improvement in left effusion and atelectasis. 2. No signs of pneumothorax.     XR CHEST PORTABLE  Result Date: 4/3/2025  1. Cardiomegaly with pulmonary vascular congestion. 2. Increasing retrocardiac opacity. 3. Small left pleural effusion.       The findings from the last RT Protocol Assessment were as follows:  Smoking: None or smoker <15 pack years  Respiratory Pattern: Regular pattern and RR 12-20 bpm  Breath Sounds: Slightly diminished and/or crackles  Cough: Strong, spontaneous, non-productive  Indication for Bronchodilator Therapy: None  Bronchodilator Assessment Score: 2    Aerosolized bronchodilator medication orders have been revised according to the RT Nebulizer Bronchodilator Protocol below.    Respiratory Therapist to perform RT Therapy Protocol Assessment initially then follow the protocol.  Repeat RT Therapy Protocol Assessment PRN for score 0-3 or on second treatment, BID, and PRN for scores above 3.    No Indications - adjust the frequency to every 6 hours PRN wheezing or bronchospasm, if no treatments needed after 48 hours then discontinue using Per Protocol order mode.     If indication present, adjust the RT bronchodilator orders based on the Bronchodilator Assessment Score as indicated below.  If a patient is on this medication at home then do not decrease Frequency below that used at home.    0-3 - enter or revise RT bronchodilator order(s) to equivalent RT Bronchodilator order with Frequency of every 4 hours PRN for wheezing or increased work of breathing using Per Protocol order mode.       4-6 - enter or revise RT Bronchodilator order(s) to two

## 2025-04-06 LAB
ANION GAP SERPL CALCULATED.3IONS-SCNC: 13 MMOL/L (ref 7–16)
BUN SERPL-MCNC: 62 MG/DL (ref 6–23)
CALCIUM SERPL-MCNC: 8.7 MG/DL (ref 8.6–10.2)
CHLORIDE SERPL-SCNC: 98 MMOL/L (ref 98–107)
CO2 SERPL-SCNC: 23 MMOL/L (ref 22–29)
CREAT SERPL-MCNC: 1.8 MG/DL (ref 0.7–1.2)
GFR, ESTIMATED: 37 ML/MIN/1.73M2
GLUCOSE BLD-MCNC: 138 MG/DL (ref 74–99)
GLUCOSE BLD-MCNC: 178 MG/DL (ref 74–99)
GLUCOSE BLD-MCNC: 215 MG/DL (ref 74–99)
GLUCOSE BLD-MCNC: 251 MG/DL (ref 74–99)
GLUCOSE SERPL-MCNC: 156 MG/DL (ref 74–99)
MICROORGANISM SPEC CULT: ABNORMAL
MICROORGANISM/AGENT SPEC: ABNORMAL
POTASSIUM SERPL-SCNC: 4.5 MMOL/L (ref 3.5–5)
SERVICE CMNT-IMP: ABNORMAL
SODIUM SERPL-SCNC: 134 MMOL/L (ref 132–146)
SPECIMEN DESCRIPTION: ABNORMAL

## 2025-04-06 PROCEDURE — 6360000002 HC RX W HCPCS: Performed by: STUDENT IN AN ORGANIZED HEALTH CARE EDUCATION/TRAINING PROGRAM

## 2025-04-06 PROCEDURE — 36415 COLL VENOUS BLD VENIPUNCTURE: CPT

## 2025-04-06 PROCEDURE — 6370000000 HC RX 637 (ALT 250 FOR IP): Performed by: STUDENT IN AN ORGANIZED HEALTH CARE EDUCATION/TRAINING PROGRAM

## 2025-04-06 PROCEDURE — 80048 BASIC METABOLIC PNL TOTAL CA: CPT

## 2025-04-06 PROCEDURE — 2140000000 HC CCU INTERMEDIATE R&B

## 2025-04-06 PROCEDURE — 94640 AIRWAY INHALATION TREATMENT: CPT

## 2025-04-06 PROCEDURE — 99232 SBSQ HOSP IP/OBS MODERATE 35: CPT | Performed by: STUDENT IN AN ORGANIZED HEALTH CARE EDUCATION/TRAINING PROGRAM

## 2025-04-06 PROCEDURE — 2500000003 HC RX 250 WO HCPCS: Performed by: STUDENT IN AN ORGANIZED HEALTH CARE EDUCATION/TRAINING PROGRAM

## 2025-04-06 PROCEDURE — 82962 GLUCOSE BLOOD TEST: CPT

## 2025-04-06 PROCEDURE — 94660 CPAP INITIATION&MGMT: CPT

## 2025-04-06 PROCEDURE — 2580000003 HC RX 258: Performed by: STUDENT IN AN ORGANIZED HEALTH CARE EDUCATION/TRAINING PROGRAM

## 2025-04-06 PROCEDURE — 2700000000 HC OXYGEN THERAPY PER DAY

## 2025-04-06 PROCEDURE — 6370000000 HC RX 637 (ALT 250 FOR IP): Performed by: NURSE PRACTITIONER

## 2025-04-06 RX ORDER — IPRATROPIUM BROMIDE AND ALBUTEROL SULFATE 2.5; .5 MG/3ML; MG/3ML
1 SOLUTION RESPIRATORY (INHALATION)
Status: DISCONTINUED | OUTPATIENT
Start: 2025-04-06 | End: 2025-04-08 | Stop reason: HOSPADM

## 2025-04-06 RX ADMIN — INSULIN GLARGINE 14 UNITS: 100 INJECTION, SOLUTION SUBCUTANEOUS at 20:30

## 2025-04-06 RX ADMIN — SODIUM BICARBONATE 650 MG: 650 TABLET ORAL at 13:27

## 2025-04-06 RX ADMIN — CEFEPIME 2000 MG: 2 INJECTION, POWDER, FOR SOLUTION INTRAVENOUS at 09:31

## 2025-04-06 RX ADMIN — TIMOLOL MALEATE 1 DROP: 5 SOLUTION/ DROPS OPHTHALMIC at 20:40

## 2025-04-06 RX ADMIN — HEPARIN SODIUM 5000 UNITS: 5000 INJECTION INTRAVENOUS; SUBCUTANEOUS at 13:28

## 2025-04-06 RX ADMIN — SODIUM BICARBONATE 650 MG: 650 TABLET ORAL at 09:19

## 2025-04-06 RX ADMIN — INSULIN LISPRO 2 UNITS: 100 INJECTION, SOLUTION INTRAVENOUS; SUBCUTANEOUS at 21:06

## 2025-04-06 RX ADMIN — BRIMONIDINE TARTRATE 1 DROP: 2 SOLUTION OPHTHALMIC at 20:34

## 2025-04-06 RX ADMIN — IPRATROPIUM BROMIDE AND ALBUTEROL SULFATE 1 DOSE: 2.5; .5 SOLUTION RESPIRATORY (INHALATION) at 20:04

## 2025-04-06 RX ADMIN — SODIUM BICARBONATE 650 MG: 650 TABLET ORAL at 20:29

## 2025-04-06 RX ADMIN — BRIMONIDINE TARTRATE 1 DROP: 2 SOLUTION OPHTHALMIC at 09:32

## 2025-04-06 RX ADMIN — CARVEDILOL 12.5 MG: 6.25 TABLET, FILM COATED ORAL at 09:19

## 2025-04-06 RX ADMIN — CLOPIDOGREL BISULFATE 75 MG: 75 TABLET, FILM COATED ORAL at 09:20

## 2025-04-06 RX ADMIN — IPRATROPIUM BROMIDE AND ALBUTEROL SULFATE 1 DOSE: 2.5; .5 SOLUTION RESPIRATORY (INHALATION) at 16:12

## 2025-04-06 RX ADMIN — SODIUM CHLORIDE, PRESERVATIVE FREE 10 ML: 5 INJECTION INTRAVENOUS at 09:25

## 2025-04-06 RX ADMIN — PANTOPRAZOLE SODIUM 40 MG: 40 TABLET, DELAYED RELEASE ORAL at 09:20

## 2025-04-06 RX ADMIN — CARVEDILOL 12.5 MG: 6.25 TABLET, FILM COATED ORAL at 16:57

## 2025-04-06 RX ADMIN — SODIUM CHLORIDE, PRESERVATIVE FREE 10 ML: 5 INJECTION INTRAVENOUS at 20:31

## 2025-04-06 RX ADMIN — TIMOLOL MALEATE 1 DROP: 5 SOLUTION/ DROPS OPHTHALMIC at 09:32

## 2025-04-06 RX ADMIN — Medication 1750 MG: at 17:54

## 2025-04-06 RX ADMIN — DOCUSATE SODIUM 100 MG: 100 CAPSULE, LIQUID FILLED ORAL at 09:20

## 2025-04-06 RX ADMIN — SODIUM BICARBONATE 650 MG: 650 TABLET ORAL at 16:57

## 2025-04-06 RX ADMIN — IPRATROPIUM BROMIDE AND ALBUTEROL SULFATE 1 DOSE: 2.5; .5 SOLUTION RESPIRATORY (INHALATION) at 12:17

## 2025-04-06 RX ADMIN — ASPIRIN 81 MG: 81 TABLET, COATED ORAL at 09:20

## 2025-04-06 RX ADMIN — ATORVASTATIN CALCIUM 40 MG: 40 TABLET, FILM COATED ORAL at 09:20

## 2025-04-06 RX ADMIN — GENTAMICIN SULFATE: 1 OINTMENT TOPICAL at 09:32

## 2025-04-06 RX ADMIN — INSULIN LISPRO 4 UNITS: 100 INJECTION, SOLUTION INTRAVENOUS; SUBCUTANEOUS at 16:57

## 2025-04-06 RX ADMIN — HEPARIN SODIUM 5000 UNITS: 5000 INJECTION INTRAVENOUS; SUBCUTANEOUS at 06:26

## 2025-04-06 RX ADMIN — IPRATROPIUM BROMIDE AND ALBUTEROL SULFATE 1 DOSE: 2.5; .5 SOLUTION RESPIRATORY (INHALATION) at 09:24

## 2025-04-06 RX ADMIN — POLYETHYLENE GLYCOL 3350 17 G: 17 POWDER, FOR SOLUTION ORAL at 09:25

## 2025-04-07 ENCOUNTER — APPOINTMENT (OUTPATIENT)
Dept: GENERAL RADIOLOGY | Age: 85
DRG: 987 | End: 2025-04-07
Attending: FAMILY MEDICINE
Payer: MEDICARE

## 2025-04-07 LAB
ANION GAP SERPL CALCULATED.3IONS-SCNC: 14 MMOL/L (ref 7–16)
BNP SERPL-MCNC: 2651 PG/ML (ref 0–450)
BUN SERPL-MCNC: 59 MG/DL (ref 6–23)
CALCIUM SERPL-MCNC: 8.8 MG/DL (ref 8.6–10.2)
CHLORIDE SERPL-SCNC: 101 MMOL/L (ref 98–107)
CO2 SERPL-SCNC: 22 MMOL/L (ref 22–29)
CREAT SERPL-MCNC: 1.6 MG/DL (ref 0.7–1.2)
DATE LAST DOSE: 0
GFR, ESTIMATED: 42 ML/MIN/1.73M2
GLUCOSE BLD-MCNC: 151 MG/DL (ref 74–99)
GLUCOSE BLD-MCNC: 218 MG/DL (ref 74–99)
GLUCOSE BLD-MCNC: 257 MG/DL (ref 74–99)
GLUCOSE BLD-MCNC: 92 MG/DL (ref 74–99)
GLUCOSE SERPL-MCNC: 87 MG/DL (ref 74–99)
MICROORGANISM SPEC CULT: ABNORMAL
MICROORGANISM SPEC CULT: NO GROWTH
MICROORGANISM SPEC CULT: NORMAL
MICROORGANISM/AGENT SPEC: ABNORMAL
MICROORGANISM/AGENT SPEC: NORMAL
NON-GYN CYTOLOGY REPORT: NORMAL
POTASSIUM SERPL-SCNC: 4.4 MMOL/L (ref 3.5–5)
SERVICE CMNT-IMP: ABNORMAL
SERVICE CMNT-IMP: NORMAL
SODIUM SERPL-SCNC: 137 MMOL/L (ref 132–146)
SPECIMEN DESCRIPTION: ABNORMAL
SPECIMEN DESCRIPTION: NORMAL
TME LAST DOSE: 0 H
VANCOMYCIN DOSE: 0 MG
VANCOMYCIN TROUGH SERPL-MCNC: 14.6 UG/ML (ref 5–16)

## 2025-04-07 PROCEDURE — 6370000000 HC RX 637 (ALT 250 FOR IP): Performed by: STUDENT IN AN ORGANIZED HEALTH CARE EDUCATION/TRAINING PROGRAM

## 2025-04-07 PROCEDURE — 2140000000 HC CCU INTERMEDIATE R&B

## 2025-04-07 PROCEDURE — 6360000002 HC RX W HCPCS: Performed by: INTERNAL MEDICINE

## 2025-04-07 PROCEDURE — 36415 COLL VENOUS BLD VENIPUNCTURE: CPT

## 2025-04-07 PROCEDURE — 6360000002 HC RX W HCPCS: Performed by: STUDENT IN AN ORGANIZED HEALTH CARE EDUCATION/TRAINING PROGRAM

## 2025-04-07 PROCEDURE — 82962 GLUCOSE BLOOD TEST: CPT

## 2025-04-07 PROCEDURE — 2580000003 HC RX 258: Performed by: STUDENT IN AN ORGANIZED HEALTH CARE EDUCATION/TRAINING PROGRAM

## 2025-04-07 PROCEDURE — 2500000003 HC RX 250 WO HCPCS: Performed by: STUDENT IN AN ORGANIZED HEALTH CARE EDUCATION/TRAINING PROGRAM

## 2025-04-07 PROCEDURE — 71045 X-RAY EXAM CHEST 1 VIEW: CPT

## 2025-04-07 PROCEDURE — 6370000000 HC RX 637 (ALT 250 FOR IP): Performed by: NURSE PRACTITIONER

## 2025-04-07 PROCEDURE — 80048 BASIC METABOLIC PNL TOTAL CA: CPT

## 2025-04-07 PROCEDURE — 2500000003 HC RX 250 WO HCPCS: Performed by: INTERNAL MEDICINE

## 2025-04-07 PROCEDURE — 2580000003 HC RX 258: Performed by: INTERNAL MEDICINE

## 2025-04-07 PROCEDURE — 80202 ASSAY OF VANCOMYCIN: CPT

## 2025-04-07 PROCEDURE — 99233 SBSQ HOSP IP/OBS HIGH 50: CPT | Performed by: INTERNAL MEDICINE

## 2025-04-07 PROCEDURE — 83880 ASSAY OF NATRIURETIC PEPTIDE: CPT

## 2025-04-07 PROCEDURE — 94660 CPAP INITIATION&MGMT: CPT

## 2025-04-07 PROCEDURE — 94640 AIRWAY INHALATION TREATMENT: CPT

## 2025-04-07 PROCEDURE — 2700000000 HC OXYGEN THERAPY PER DAY

## 2025-04-07 RX ORDER — SODIUM CHLORIDE 0.9 % (FLUSH) 0.9 %
5-40 SYRINGE (ML) INJECTION EVERY 12 HOURS SCHEDULED
Status: DISCONTINUED | OUTPATIENT
Start: 2025-04-07 | End: 2025-04-08 | Stop reason: HOSPADM

## 2025-04-07 RX ORDER — SODIUM CHLORIDE 9 MG/ML
INJECTION, SOLUTION INTRAVENOUS PRN
Status: DISCONTINUED | OUTPATIENT
Start: 2025-04-07 | End: 2025-04-08 | Stop reason: HOSPADM

## 2025-04-07 RX ORDER — SODIUM CHLORIDE 0.9 % (FLUSH) 0.9 %
5-40 SYRINGE (ML) INJECTION PRN
Status: DISCONTINUED | OUTPATIENT
Start: 2025-04-07 | End: 2025-04-08 | Stop reason: HOSPADM

## 2025-04-07 RX ORDER — LIDOCAINE HYDROCHLORIDE 10 MG/ML
50 INJECTION, SOLUTION EPIDURAL; INFILTRATION; INTRACAUDAL; PERINEURAL ONCE
Status: DISCONTINUED | OUTPATIENT
Start: 2025-04-07 | End: 2025-04-08 | Stop reason: HOSPADM

## 2025-04-07 RX ADMIN — SODIUM BICARBONATE 650 MG: 650 TABLET ORAL at 17:44

## 2025-04-07 RX ADMIN — INSULIN LISPRO 2 UNITS: 100 INJECTION, SOLUTION INTRAVENOUS; SUBCUTANEOUS at 17:44

## 2025-04-07 RX ADMIN — HEPARIN SODIUM 5000 UNITS: 5000 INJECTION INTRAVENOUS; SUBCUTANEOUS at 21:41

## 2025-04-07 RX ADMIN — BRIMONIDINE TARTRATE 1 DROP: 2 SOLUTION OPHTHALMIC at 09:55

## 2025-04-07 RX ADMIN — SODIUM CHLORIDE, PRESERVATIVE FREE 10 ML: 5 INJECTION INTRAVENOUS at 21:41

## 2025-04-07 RX ADMIN — INSULIN LISPRO 4 UNITS: 100 INJECTION, SOLUTION INTRAVENOUS; SUBCUTANEOUS at 21:42

## 2025-04-07 RX ADMIN — IPRATROPIUM BROMIDE AND ALBUTEROL SULFATE 1 DOSE: 2.5; .5 SOLUTION RESPIRATORY (INHALATION) at 19:32

## 2025-04-07 RX ADMIN — Medication 1750 MG: at 19:30

## 2025-04-07 RX ADMIN — IPRATROPIUM BROMIDE AND ALBUTEROL SULFATE 1 DOSE: 2.5; .5 SOLUTION RESPIRATORY (INHALATION) at 15:44

## 2025-04-07 RX ADMIN — SODIUM BICARBONATE 650 MG: 650 TABLET ORAL at 09:54

## 2025-04-07 RX ADMIN — SODIUM BICARBONATE 650 MG: 650 TABLET ORAL at 15:17

## 2025-04-07 RX ADMIN — TIMOLOL MALEATE 1 DROP: 5 SOLUTION/ DROPS OPHTHALMIC at 21:56

## 2025-04-07 RX ADMIN — CARVEDILOL 12.5 MG: 6.25 TABLET, FILM COATED ORAL at 17:44

## 2025-04-07 RX ADMIN — TIMOLOL MALEATE 1 DROP: 5 SOLUTION/ DROPS OPHTHALMIC at 09:55

## 2025-04-07 RX ADMIN — ASPIRIN 81 MG: 81 TABLET, COATED ORAL at 09:51

## 2025-04-07 RX ADMIN — CARVEDILOL 12.5 MG: 6.25 TABLET, FILM COATED ORAL at 09:51

## 2025-04-07 RX ADMIN — CEFEPIME 2000 MG: 2 INJECTION, POWDER, FOR SOLUTION INTRAVENOUS at 21:50

## 2025-04-07 RX ADMIN — BRIMONIDINE TARTRATE 1 DROP: 2 SOLUTION OPHTHALMIC at 21:56

## 2025-04-07 RX ADMIN — HEPARIN SODIUM 5000 UNITS: 5000 INJECTION INTRAVENOUS; SUBCUTANEOUS at 15:17

## 2025-04-07 RX ADMIN — CEFEPIME 2000 MG: 2 INJECTION, POWDER, FOR SOLUTION INTRAVENOUS at 10:20

## 2025-04-07 RX ADMIN — INSULIN GLARGINE 14 UNITS: 100 INJECTION, SOLUTION SUBCUTANEOUS at 21:42

## 2025-04-07 RX ADMIN — HEPARIN SODIUM 5000 UNITS: 5000 INJECTION INTRAVENOUS; SUBCUTANEOUS at 05:49

## 2025-04-07 RX ADMIN — CLOPIDOGREL BISULFATE 75 MG: 75 TABLET, FILM COATED ORAL at 09:54

## 2025-04-07 RX ADMIN — ATORVASTATIN CALCIUM 40 MG: 40 TABLET, FILM COATED ORAL at 09:54

## 2025-04-07 RX ADMIN — SODIUM BICARBONATE 650 MG: 650 TABLET ORAL at 21:41

## 2025-04-07 RX ADMIN — SODIUM CHLORIDE, PRESERVATIVE FREE 10 ML: 5 INJECTION INTRAVENOUS at 21:57

## 2025-04-07 RX ADMIN — PANTOPRAZOLE SODIUM 40 MG: 40 TABLET, DELAYED RELEASE ORAL at 09:54

## 2025-04-07 RX ADMIN — IPRATROPIUM BROMIDE AND ALBUTEROL SULFATE 1 DOSE: 2.5; .5 SOLUTION RESPIRATORY (INHALATION) at 07:31

## 2025-04-07 RX ADMIN — IPRATROPIUM BROMIDE AND ALBUTEROL SULFATE 1 DOSE: 2.5; .5 SOLUTION RESPIRATORY (INHALATION) at 10:56

## 2025-04-07 RX ADMIN — GENTAMICIN SULFATE: 1 OINTMENT TOPICAL at 10:33

## 2025-04-07 RX ADMIN — SODIUM CHLORIDE, PRESERVATIVE FREE 10 ML: 5 INJECTION INTRAVENOUS at 10:23

## 2025-04-07 NOTE — DISCHARGE INSTRUCTIONS
Be compliant with medication.  Complete antibiotics per infectious disease.  Follow-up with podiatry  Follow-up with cardiology for repeat echo in 3 months.  Follow-up with pulmonology.  Follow up with ID clinic in 1-2 weeks.           [facility]  ([facility])  [address] [place] [address]  [address] [place] [address]  Phone [phone]   Fax [phone]    Gregory Valverde MD, FACP MD Elke Morel MD Indra P. Limbu, MD Eunice A. H. Wong, MD Arindam Ghatak, MD Santy Paulson, CNS Joseph Partida, APRN, CNS  Danielle Aguirre, APRN-CNP Melvin Summers, APRN, CNS Dionna Mei, APRN-CNP                 STANDING ORDERS (“ID Protocol”)     Visiting nurses are to write the Primary Care Physician and their own call back number on all laboratory requisition forms.   Abnormal lab values are called to the physician by the nurse and NOT by the laboratory.   Fax all labs to the office in a timely manner, during office hours. All faxes should include nurse’s name and call back number.  Vascular Access Devices or VADs (TLC, PICC, Midline, etc) will be replaced as necessary.  Draw all blood work from VADs, except for drug levels.  If unable to access a VAD, insert a peripheral catheter temporarily. Contact the Primary Care Physician or [facility] office for surgical referral.  Use tPA (Cathflo®, Alteplase®) as per agency protocol to restore patency of VAD.  Saline flush 10ml or heparin flush 10U/cc IV daily and as needed to maintain line patency.  Remove VAD upon completion of IV antibiotics, unless otherwise specified by the ordering physician.  If VAD cannot be removed, schedule appointment at office for removal.  If VAD was placed by Radiology, schedule appointment for removal.  Notify ordering physician or office if patient requires admission to the hospital with reason for admission.  Discontinue all blood work upon completion of IV antibiotics, unless otherwise

## 2025-04-08 ENCOUNTER — APPOINTMENT (OUTPATIENT)
Dept: GENERAL RADIOLOGY | Age: 85
DRG: 987 | End: 2025-04-08
Attending: FAMILY MEDICINE
Payer: MEDICARE

## 2025-04-08 VITALS
HEIGHT: 68 IN | SYSTOLIC BLOOD PRESSURE: 133 MMHG | RESPIRATION RATE: 19 BRPM | OXYGEN SATURATION: 97 % | WEIGHT: 236.33 LBS | HEART RATE: 65 BPM | TEMPERATURE: 98.2 F | BODY MASS INDEX: 35.82 KG/M2 | DIASTOLIC BLOOD PRESSURE: 70 MMHG

## 2025-04-08 LAB
ANION GAP SERPL CALCULATED.3IONS-SCNC: 10 MMOL/L (ref 7–16)
BUN SERPL-MCNC: 54 MG/DL (ref 6–23)
CALCIUM SERPL-MCNC: 8.4 MG/DL (ref 8.6–10.2)
CHLORIDE SERPL-SCNC: 102 MMOL/L (ref 98–107)
CO2 SERPL-SCNC: 23 MMOL/L (ref 22–29)
CREAT SERPL-MCNC: 1.5 MG/DL (ref 0.7–1.2)
GFR, ESTIMATED: 46 ML/MIN/1.73M2
GLUCOSE BLD-MCNC: 134 MG/DL (ref 74–99)
GLUCOSE BLD-MCNC: 136 MG/DL (ref 74–99)
GLUCOSE SERPL-MCNC: 140 MG/DL (ref 74–99)
MICROORGANISM SPEC CULT: NORMAL
MICROORGANISM SPEC CULT: NORMAL
MICROORGANISM/AGENT SPEC: NORMAL
POTASSIUM SERPL-SCNC: 4.4 MMOL/L (ref 3.5–5)
SERVICE CMNT-IMP: NORMAL
SERVICE CMNT-IMP: NORMAL
SODIUM SERPL-SCNC: 135 MMOL/L (ref 132–146)
SPECIMEN DESCRIPTION: NORMAL
SPECIMEN DESCRIPTION: NORMAL

## 2025-04-08 PROCEDURE — 97165 OT EVAL LOW COMPLEX 30 MIN: CPT

## 2025-04-08 PROCEDURE — 94640 AIRWAY INHALATION TREATMENT: CPT

## 2025-04-08 PROCEDURE — 36569 INSJ PICC 5 YR+ W/O IMAGING: CPT

## 2025-04-08 PROCEDURE — C1751 CATH, INF, PER/CENT/MIDLINE: HCPCS

## 2025-04-08 PROCEDURE — 76937 US GUIDE VASCULAR ACCESS: CPT

## 2025-04-08 PROCEDURE — 2500000003 HC RX 250 WO HCPCS: Performed by: INTERNAL MEDICINE

## 2025-04-08 PROCEDURE — 99239 HOSP IP/OBS DSCHRG MGMT >30: CPT | Performed by: INTERNAL MEDICINE

## 2025-04-08 PROCEDURE — 80048 BASIC METABOLIC PNL TOTAL CA: CPT

## 2025-04-08 PROCEDURE — 6370000000 HC RX 637 (ALT 250 FOR IP): Performed by: STUDENT IN AN ORGANIZED HEALTH CARE EDUCATION/TRAINING PROGRAM

## 2025-04-08 PROCEDURE — 02HV33Z INSERTION OF INFUSION DEVICE INTO SUPERIOR VENA CAVA, PERCUTANEOUS APPROACH: ICD-10-PCS | Performed by: INTERNAL MEDICINE

## 2025-04-08 PROCEDURE — 6360000002 HC RX W HCPCS: Performed by: INTERNAL MEDICINE

## 2025-04-08 PROCEDURE — 6360000002 HC RX W HCPCS: Performed by: STUDENT IN AN ORGANIZED HEALTH CARE EDUCATION/TRAINING PROGRAM

## 2025-04-08 PROCEDURE — 97161 PT EVAL LOW COMPLEX 20 MIN: CPT

## 2025-04-08 PROCEDURE — 6370000000 HC RX 637 (ALT 250 FOR IP): Performed by: NURSE PRACTITIONER

## 2025-04-08 PROCEDURE — 82962 GLUCOSE BLOOD TEST: CPT

## 2025-04-08 PROCEDURE — 97535 SELF CARE MNGMENT TRAINING: CPT

## 2025-04-08 PROCEDURE — 2500000003 HC RX 250 WO HCPCS: Performed by: STUDENT IN AN ORGANIZED HEALTH CARE EDUCATION/TRAINING PROGRAM

## 2025-04-08 PROCEDURE — 2580000003 HC RX 258: Performed by: INTERNAL MEDICINE

## 2025-04-08 PROCEDURE — 97530 THERAPEUTIC ACTIVITIES: CPT

## 2025-04-08 PROCEDURE — 36415 COLL VENOUS BLD VENIPUNCTURE: CPT

## 2025-04-08 PROCEDURE — 2700000000 HC OXYGEN THERAPY PER DAY

## 2025-04-08 RX ORDER — GENTAMICIN SULFATE 1 MG/G
OINTMENT TOPICAL
DISCHARGE
Start: 2025-04-08 | End: 2025-04-15

## 2025-04-08 RX ORDER — PSEUDOEPHEDRINE HCL 30 MG
100 TABLET ORAL 2 TIMES DAILY
DISCHARGE
Start: 2025-04-08

## 2025-04-08 RX ORDER — SODIUM BICARBONATE 650 MG/1
650 TABLET ORAL 4 TIMES DAILY
DISCHARGE
Start: 2025-04-08

## 2025-04-08 RX ADMIN — IPRATROPIUM BROMIDE AND ALBUTEROL SULFATE 1 DOSE: 2.5; .5 SOLUTION RESPIRATORY (INHALATION) at 08:03

## 2025-04-08 RX ADMIN — SODIUM CHLORIDE, PRESERVATIVE FREE 10 ML: 5 INJECTION INTRAVENOUS at 09:35

## 2025-04-08 RX ADMIN — IPRATROPIUM BROMIDE AND ALBUTEROL SULFATE 1 DOSE: 2.5; .5 SOLUTION RESPIRATORY (INHALATION) at 12:07

## 2025-04-08 RX ADMIN — CARVEDILOL 12.5 MG: 6.25 TABLET, FILM COATED ORAL at 09:34

## 2025-04-08 RX ADMIN — POLYETHYLENE GLYCOL 3350 17 G: 17 POWDER, FOR SOLUTION ORAL at 09:34

## 2025-04-08 RX ADMIN — GENTAMICIN SULFATE: 1 OINTMENT TOPICAL at 09:52

## 2025-04-08 RX ADMIN — CEFEPIME 2000 MG: 2 INJECTION, POWDER, FOR SOLUTION INTRAVENOUS at 09:57

## 2025-04-08 RX ADMIN — BRIMONIDINE TARTRATE 1 DROP: 2 SOLUTION OPHTHALMIC at 09:52

## 2025-04-08 RX ADMIN — HEPARIN SODIUM 5000 UNITS: 5000 INJECTION INTRAVENOUS; SUBCUTANEOUS at 06:24

## 2025-04-08 RX ADMIN — DOCUSATE SODIUM 100 MG: 100 CAPSULE, LIQUID FILLED ORAL at 09:34

## 2025-04-08 RX ADMIN — SODIUM BICARBONATE 650 MG: 650 TABLET ORAL at 09:34

## 2025-04-08 RX ADMIN — PANTOPRAZOLE SODIUM 40 MG: 40 TABLET, DELAYED RELEASE ORAL at 09:34

## 2025-04-08 RX ADMIN — CLOPIDOGREL BISULFATE 75 MG: 75 TABLET, FILM COATED ORAL at 09:34

## 2025-04-08 RX ADMIN — ATORVASTATIN CALCIUM 40 MG: 40 TABLET, FILM COATED ORAL at 09:34

## 2025-04-08 RX ADMIN — ASPIRIN 81 MG: 81 TABLET, COATED ORAL at 09:34

## 2025-04-08 RX ADMIN — TIMOLOL MALEATE 1 DROP: 5 SOLUTION/ DROPS OPHTHALMIC at 09:52

## 2025-04-08 RX ADMIN — PETROLATUM: 420 OINTMENT TOPICAL at 09:52

## 2025-04-08 NOTE — PROGRESS NOTES
Jah Palomo M.D.,[facility]  Dandre Ceja D.O., ANN., [facility]  Trent Live M.D.  Dawn De La Torre M.D.   Alex Snowden D.O.  Enrique Lucas M.D.         Daily Pulmonary Progress Note    Patient:  Aubrey Up 84 y.o. male MRN: [id number]            Synopsis     We are following patient for pleural effusion    \"CC\" shortness of breath    Code status: Full      Subjective      Patient was seen and examined.    left thoracentesis today with fluid analysis culture and cytology.  Anticoagulation including heparin subcu, Plavix and aspirin on hold for procedure.  Timing per IR recommendations TBD.  Not requiring supplemental oxygen.  No fevers documented.  No complaints with breathing.      Review of Systems:  Constitutional: Denies fever, weight loss, night sweats, and fatigue  Skin: Denies pigmentation, dark lesions, and rashes   HEENT: Denies  tinnitus, ear drainage, epistaxis, sore throat, and hoarseness. Samish  Cardiovascular: Denies palpitations, chest pain, and chest pressure.  Respiratory: Denies cough, dyspnea at rest, hemoptysis, apnea, and choking.  Gastrointestinal: Denies nausea, vomiting, poor appetite, diarrhea, heartburn or reflux  Genitourinary: Denies dysuria, frequency, urgency or hematuria  Musculoskeletal: Denies myalgias, muscle weakness, and bone pain  Neurological: Denies dizziness, vertigo, headache, and focal weakness  Psychological: Denies anxiety and depression  Endocrine: Denies heat intolerance and cold intolerance  Hematopoietic/Lymphatic: Denies bleeding problems and blood transfusions    24-hour events:  As above    Objective   OBJECTIVE:   /62   Pulse 78   Temp 97.7 °F (36.5 °C) (Oral)   Resp 26   Ht 1.727 m (5' 8\")   Wt 95 kg (209 lb 7 oz)   SpO2 97%   BMI 31.84 kg/m²   SpO2 Readings from Last 1 Encounters:   04/04/25 97%        I/O:    Intake/Output Summary (Last 24 hours) at 4/4/2025 0901  Last data filed at 4/3/2025 2100  Gross per 24 hour   Intake 
       [facility] Hospitalist Progress Note    Admitting Date and Time: 4/1/2025  2:03 PM  Admit Dx: Hyponatremia [E87.1]  Patient admitted on 4/1/2025      Aubrey Up is a 84 y.o. male patient of Dionne Singh DO with history of CVA, gastroparesis, TIA, sleep apnea on CPAP, history of asbestosis, presented to [facility] on 4/1/2025 from [facility] as a transfer for evaluation of pericardial effusion.  Patient presented to [facility] on/1/25 with complaints of worsening shortness of breath and weight gain.  Patient was feeling bloated with abdominal discomfort and constipation.  Patient endorsed bilateral lower extremity wounds for which patient follows with podiatry as outpatient.  Patient had CT abdomen pelvis that reported pericardial effusion and patient was transferred for further cardiology evaluation.      Subjective:  Patient is being followed for Hyponatremia [E87.1]   Patient seen and examined.  On 2 L nasal cannula  Family at bedside.  No new complaints or concerns    ROS: denies fever, chills, cp, sob, n/v, HA unless stated above.      ipratropium 0.5 mg-albuterol 2.5 mg  1 Dose Inhalation 4x Daily RT    cefepime  2,000 mg IntraVENous Q24H    vancomycin  1,750 mg IntraVENous Q24H    docusate sodium  100 mg Oral BID    polyethylene glycol  17 g Oral Daily    sodium chloride flush  5-40 mL IntraVENous 2 times per day    heparin (porcine)  5,000 Units SubCUTAneous 3 times per day    insulin lispro  0-8 Units SubCUTAneous 4x Daily AC & HS    atorvastatin  40 mg Oral Daily    brimonidine  1 drop Right Eye BID    carvedilol  12.5 mg Oral BID WC    clopidogrel  75 mg Oral Daily    insulin glargine  14 Units SubCUTAneous Nightly    pantoprazole  40 mg Oral Daily    gentamicin   Topical BID    timolol  1 drop Both Eyes BID    sodium bicarbonate  650 mg Oral 4x Daily    aspirin  81 mg Oral Daily     ipratropium 0.5 mg-albuterol 2.5 mg, 1 Dose, Q4H PRN  bisacodyl, 10 mg, Daily 
      HOSPITALIST PROGRESS NOTE    Patient's name: Aubrey Up  : 1940  Admission date: 2025  Date of service: 2025   Primary care physician: Dionne Singh DO    Assessment   Patient admitted on 2025     Aubrey Up is a 84 y.o. male patient of Dionne Singh DO with history of CVA, gastroparesis, TIA, sleep apnea on CPAP, history of asbestosis, presented to [facility] on 2025 from [facility] as a transfer for evaluation of pericardial effusion.  Patient presented to [facility] on with complaints of worsening shortness of breath and weight gain.  Patient was feeling bloated with abdominal discomfort and constipation.  Patient endorsed bilateral lower extremity wounds for which patient follows with podiatry as outpatient.  Patient had CT abdomen pelvis that reported pericardial effusion and patient was transferred for further cardiology evaluation.    Pericardial effusion:  Echocardiogram reviewed from moderate to large pericardial effusion with no pericardial tamponade  Patient cleared for podiatry procedure by cardiology.  Aspirin, Plavix on hold    Full-thickness ulceration left heel  Full-thickness ulcer right heel  Acute osteomyelitis of right foot:  Wound cultures from 3/25 growing Pseudomonas and Corynebacterium  ID consulted  Podiatry on board, s/p bilateral lower extremity I&D, application of skin graft bilaterally and wound VAC to the right foot.    Plan to start antibiotic postbiopsy per ID-Vanco and cefepime    Left pleural effusion  Pulmonology consulted  S/p thoracentesis on 25    Hyponatremia-improving  MAMI on CKD stage III:  Baseline creatinine around 2-2.3, will repeat nephrology, renal function stable   further management per nephrology    Insulin-dependent diabetes mellitus  Patient having episodes of hypoglycemia  Holding Lantus  Continue sliding scale insulin    GERD  Continue Protonix    Palliative care was consulted for 
      HOSPITALIST PROGRESS NOTE    Patient's name: Aubrey Up  : 1940  Admission date: 2025  Date of service: 2025   Primary care physician: Dionne Singh DO    Assessment   Patient admitted on 2025     Aubrey Up is a 84 y.o. male patient of Dionne Singh DO with history of CVA, gastroparesis, TIA, sleep apnea on CPAP, history of asbestosis, presented to [facility] on 2025 from [facility] as a transfer for evaluation of pericardial effusion.  Patient presented to [facility] on with complaints of worsening shortness of breath and weight gain.  Patient was feeling bloated with abdominal discomfort and constipation.  Patient endorsed bilateral lower extremity wounds for which patient follows with podiatry as outpatient.  Patient had CT abdomen pelvis that reported pericardial effusion and patient was transferred for further cardiology evaluation.    Pericardial effusion:  Echocardiogram reviewed from moderate to large pericardial effusion with no pericardial tamponade  Patient cleared for podiatry procedure by cardiology.  Aspirin, Plavix on hold    Full-thickness ulceration left heel  Full-thickness ulcer right heel  Acute osteomyelitis of right foot:  Antibiotics held per ID  Wound cultures from 3/25 growing Pseudomonas and Corynebacterium  ID consulted  Podiatry on board, plan for skin graft substitute versus split thickness skin graft to bilateral heels with incision and drainage with bone biopsy on 25.    Left pleural effusion  Pulmonology consulted  S/p thoracentesis on    Hyponatremia-improving  MAMI on CKD stage III:  Baseline creatinine around 2-2.3, will repeat nephrology, renal function stable   further management per nephrology    Insulin-dependent diabetes mellitus  Patient having episodes of hypoglycemia  Holding Lantus  Continue sliding scale insulin    GERD  Continue Protonix    Palliative care was consulted for goals of 
   04/03/25 2243   NIV Type   NIV Started/Stopped On   Equipment Type v60   Mode Bilevel   Mask Type Full face mask   Mask Size Medium   Assessment   Level of Consciousness 0   Comfort Level Good   Using Accessory Muscles No   Mask Compliance Good   Skin Assessment Clean, dry, & intact   Skin Protection for O2 Device N/A   Settings/Measurements   PIP Observed 19 cm H20   IPAP 17 cmH20   CPAP/EPAP 12 cmH2O   Vt (Measured) 428 mL   Rate Ordered 16   Insp Rise Time (%) 2 %   FiO2  21 %   I Time/ I Time % 0.85 s   Minute Volume (L/min) 10.1 Liters   Mask Leak (lpm) 60 lpm   Patient's Home Machine No   Alarm Settings   Alarms On Y   Low Pressure (cmH2O) 7 cmH2O   High Pressure (cmH2O) 35 cmH2O   Apnea (secs) 20 secs   RR Low (bpm) 14   RR High (bpm) 45 br/min       
   04/04/25 2239   NIV Type   NIV Started/Stopped On   Equipment Type v60   Mode Bilevel   Mask Type Full face mask   Mask Size Medium   Assessment   Respirations 24   Level of Consciousness 0   Comfort Level Good   Using Accessory Muscles No   Mask Compliance Good   Skin Assessment Clean, dry, & intact   Skin Protection for O2 Device Yes   Orientation Middle   Location Nose   Intervention(s) Skin Barrier   Breath Sounds   Respiratory Pattern Regular   Settings/Measurements   PIP Observed 18 cm H20   IPAP 17 cmH20   CPAP/EPAP 12 cmH2O   Vt (Measured) 391 mL   Rate Ordered 16   Insp Rise Time (%) 2 %   FiO2  21 %   I Time/ I Time % 0.85 s   Minute Volume (L/min) 9.3 Liters   Mask Leak (lpm) 70 lpm   Patient's Home Machine No   Alarm Settings   Alarms On Y   Low Pressure (cmH2O) 7 cmH2O   High Pressure (cmH2O) 35 cmH2O   Apnea (secs) 20 secs   RR Low (bpm) 14   RR High (bpm) 45 br/min       
  [facility]  [facility]  Progress Note    SUBJECTIVE:  No chief complaint on file.      Patient resting in bed. Denies fever or chills. No nausea, vomiting, rash or diarrhea.  Daughter visiting      Review of systems:  As stated above in the chief complaint, otherwise negative.    Medications:  Scheduled Meds:   cefepime  2,000 mg IntraVENous Q24H    docusate sodium  100 mg Oral BID    polyethylene glycol  17 g Oral Daily    sodium chloride flush  5-40 mL IntraVENous 2 times per day    heparin (porcine)  5,000 Units SubCUTAneous 3 times per day    insulin lispro  0-8 Units SubCUTAneous 4x Daily AC & HS    atorvastatin  40 mg Oral Daily    brimonidine  1 drop Right Eye BID    carvedilol  12.5 mg Oral BID WC    clopidogrel  75 mg Oral Daily    insulin glargine  14 Units SubCUTAneous Nightly    pantoprazole  40 mg Oral Daily    gentamicin   Topical BID    timolol  1 drop Both Eyes BID    sodium bicarbonate  650 mg Oral 4x Daily    aspirin  81 mg Oral Daily     Continuous Infusions:   sodium chloride      dextrose       PRN Meds:ipratropium 0.5 mg-albuterol 2.5 mg, bisacodyl, white petrolatum, sulfur hexafluoride microspheres, white petrolatum, sodium chloride flush, sodium chloride, ondansetron **OR** ondansetron, polyethylene glycol, acetaminophen **OR** acetaminophen, glucose, dextrose bolus **OR** dextrose bolus, glucagon (rDNA), dextrose    OBJECTIVE:  /60   Pulse 59   Temp 97.8 °F (36.6 °C) (Temporal)   Resp 18   Ht 1.727 m (5' 8\")   Wt 95 kg (209 lb 7 oz)   SpO2 97%   BMI 31.84 kg/m²   Temp  Av.5 °F (36.4 °C)  Min: 97.1 °F (36.2 °C)  Max: 97.8 °F (36.6 °C)  Constitutional: No acute  Skin: Warm and dry. No rashes were noted.   Neuro: Somnolent, easily awakened, oriented.  HEENT: Round and reactive pupils.  Moist mucous membranes.  No ulcerations or thrush.  Chest: .Respirations unlabored. Breath sounds clear.   Cardiovascular:  RRR  Abdomen: Soft. Bowel sounds 
1900- Page sent to Dr. Marie.    1804- Call back received from Dr. Marie. Notified Dr. Marie of podiatry needing cardiac clearance prior to tomorrow's procedure.     EJ LAMA RN    
4 Eyes Skin Assessment     NAME:  Aubrey Up  YOB: 1940  MEDICAL RECORD NUMBER:  [id number]    The patient is being assessed for  Admission    I agree that at least one RN has performed a thorough Head to Toe Skin Assessment on the patient. ALL assessment sites listed below have been assessed.      Areas assessed by both nurses:    Head, Face, Ears, Shoulders, Back, Chest, Arms, Elbows, Hands, Sacrum. Buttock, Coccyx, Ischium, Legs. Feet and Heels, and Under Medical Devices         Does the Patient have a Wound? Yes wound(s) were present on assessment. LDA wound assessment was Initiated and completed by RN       Alexander Prevention initiated by RN: Yes  Wound Care Orders initiated by RN: Yes    Pressure Injury (Stage 3,4, Unstageable, DTI, NWPT, and Complex wounds) if present, place Wound referral order by RN under : Yes    New Ostomies, if present place, Ostomy referral order under : No     Nurse 1 eSignature: Electronically signed by Amber Pineda RN on 4/1/25 at 3:24 PM EDT    **SHARE this note so that the co-signing nurse can place an eSignature**    Nurse 2 eSignature: {Esignature:[id number]}  
4 Eyes Skin Assessment     NAME:  Aubrey Up  YOB: 1940  MEDICAL RECORD NUMBER:  [id number]    The patient is being assessed for  Admission    I agree that at least one RN has performed a thorough Head to Toe Skin Assessment on the patient. ALL assessment sites listed below have been assessed.      Areas assessed by both nurses:    Head, Face, Ears, Shoulders, Back, Chest, Arms, Elbows, Hands, Sacrum. Buttock, Coccyx, Ischium, Legs. Feet and Heels, and Under Medical Devices         Does the Patient have a Wound? Yes wound(s) were present on assessment. LDA wound assessment was Initiated and completed by RN       Alexander Prevention initiated by RN: Yes  Wound Care Orders initiated by RN: Yes    Pressure Injury (Stage 3,4, Unstageable, DTI, NWPT, and Complex wounds) if present, place Wound referral order by RN under : Yes    New Ostomies, if present place, Ostomy referral order under : No     Nurse 1 eSignature: Electronically signed by EJ LAMA RN on 4/3/25 at 11:38 AM EDT    **SHARE this note so that the co-signing nurse can place an eSignature**    Nurse 2 eSignature: {Esignature:[id number]}    
Admission notification forwarded to Dr. Marino Milanes.   
Date: 4/5/2025    Time: 12:34 AM    Patient Placed On BIPAP/CPAP/ Non-Invasive Ventilation?  No, already on    If no must comment.  Facial area red/color change? No           If YES are Blister/Lesion present?No   If yes must notify nursing staff  BIPAP/CPAP skin barrier?  Yes    Skin barrier type:mepilexlite       Comments:        Maame Correa RCP  
Department of Internal Medicine  Infectious Diseases  Progrss  Note      C/C:  bilateral heel wounds, right heel osteomyelitis     Denies fever or chills  Pain +  Afebrile       Current Facility-Administered Medications   Medication Dose Route Frequency Provider Last Rate Last Admin    ipratropium 0.5 mg-albuterol 2.5 mg (DUONEB) nebulizer solution 1 Dose  1 Dose Inhalation 4x Daily RT Marlena Javier, APRN - CNP   1 Dose at 04/07/25 1056    ipratropium 0.5 mg-albuterol 2.5 mg (DUONEB) nebulizer solution 1 Dose  1 Dose Nebulization Q4H PRN Edison Bloom MD   1 Dose at 04/06/25 1612    ceFEPIme (MAXIPIME) 2,000 mg in sodium chloride 0.9 % 100 mL IVPB ([id number])  2,000 mg IntraVENous Q24H Edison Bloom MD 25 mL/hr at 04/07/25 1020 2,000 mg at 04/07/25 1020    bisacodyl (DULCOLAX) suppository 10 mg  10 mg Rectal Daily PRN Edison Bloom MD   10 mg at 04/05/25 1754    vancomycin (VANCOCIN) 1750 mg in sodium chloride 0.9 % 500 mL IVPB  1,750 mg IntraVENous Q24H Edison Bloom MD   Stopped at 04/06/25 2042    white petrolatum ointment   Topical PRN Edison Bloom MD        docusate sodium (COLACE) capsule 100 mg  100 mg Oral BID Edison Bloom MD   100 mg at 04/06/25 0920    polyethylene glycol (GLYCOLAX) packet 17 g  17 g Oral Daily Edison Bloom MD   17 g at 04/06/25 0925    sulfur hexafluoride microspheres (LUMASON) 60.7-25 MG injection 2 mL  2 mL IntraVENous ONCE PRN Santy Marie DO        white petrolatum ointment   Topical BID PRN Alon Farooq MD   Given at 04/02/25 1012    sodium chloride flush 0.9 % injection 5-40 mL  5-40 mL IntraVENous 2 times per day Milanes Marino, Maria, MD   10 mL at 04/07/25 1023    sodium chloride flush 0.9 % injection 5-40 mL  5-40 mL IntraVENous PRN Milanes Marino, Maria, MD        0.9 % sodium chloride infusion   IntraVENous PRN Milanes Marino, Maria, MD        ondansetron (ZOFRAN-ODT) disintegrating tablet 4 mg  4 mg Oral Q8H PRN Milanes Marino, Maria, MD     
Department of Internal Medicine  Nephrology Attending Consult Note    Events reviewed.    SUBJECTIVE: We are following Aubrey Joseyoseph Up for MAMI on CKD. Patient reports no complaints.    PHYSICAL EXAM:      Vitals:    VITALS:  /61   Pulse 70   Temp 97.7 °F (36.5 °C) (Oral)   Resp 22   Ht 1.727 m (5' 8\")   Wt 95 kg (209 lb 7 oz)   SpO2 91%   BMI 31.84 kg/m²   24HR INTAKE/OUTPUT:    Intake/Output Summary (Last 24 hours) at 4/4/2025 0957  Last data filed at 4/3/2025 2100  Gross per 24 hour   Intake 1052.46 ml   Output 785 ml   Net 267.46 ml     Scheduled Meds:   docusate sodium  100 mg Oral BID    polyethylene glycol  17 g Oral Daily    sodium chloride flush  5-40 mL IntraVENous 2 times per day    [Held by provider] heparin (porcine)  5,000 Units SubCUTAneous 3 times per day    insulin lispro  0-8 Units SubCUTAneous 4x Daily AC & HS    [Held by provider] atorvastatin  40 mg Oral Daily    brimonidine  1 drop Right Eye BID    carvedilol  12.5 mg Oral BID WC    [Held by provider] clopidogrel  75 mg Oral Daily    [Held by provider] insulin glargine  14 Units SubCUTAneous Nightly    ipratropium 0.5 mg-albuterol 2.5 mg  1 Dose Nebulization Q4H    pantoprazole  40 mg Oral Daily    gentamicin   Topical BID    timolol  1 drop Both Eyes BID    sodium bicarbonate  650 mg Oral 4x Daily    [Held by provider] aspirin  81 mg Oral Daily     Continuous Infusions:   lactated ringers 50 mL/hr at 04/03/25 0929    sodium chloride      dextrose       PRN Meds:.sulfur hexafluoride microspheres, white petrolatum, sodium chloride flush, sodium chloride, ondansetron **OR** ondansetron, polyethylene glycol, acetaminophen **OR** acetaminophen, glucose, dextrose bolus **OR** dextrose bolus, glucagon (rDNA), dextrose     Constitutional:  Awake, alert, oriented, in NAD  HEENT:  PERRLA, normocephalic, atraumatic  Respiratory:  CTA  Cardiovascular/Edema:  RRR, normal S1, normal S2  Gastrointestinal:  Soft, flat, non-distended, 
Department of Internal Medicine  Nephrology Attending Consult Note    Events reviewed.    SUBJECTIVE: We are following Aubrey Up for MAMI on CKD. Patient reports no complaints.    PHYSICAL EXAM:      Vitals:    VITALS:  BP (!) 128/56   Pulse 84   Temp 97.7 °F (36.5 °C) (Oral)   Resp 20   Ht 1.727 m (5' 8\")   Wt 93.2 kg (205 lb 7.5 oz)   SpO2 94%   BMI 31.24 kg/m²   24HR INTAKE/OUTPUT:    Intake/Output Summary (Last 24 hours) at 4/3/2025 0955  Last data filed at 4/3/2025 0617  Gross per 24 hour   Intake 988.9 ml   Output 1750 ml   Net -761.1 ml     Scheduled Meds:   docusate sodium  100 mg Oral BID    polyethylene glycol  17 g Oral Daily    sodium chloride flush  5-40 mL IntraVENous 2 times per day    [Held by provider] heparin (porcine)  5,000 Units SubCUTAneous 3 times per day    insulin lispro  0-8 Units SubCUTAneous 4x Daily AC & HS    [Held by provider] atorvastatin  40 mg Oral Daily    brimonidine  1 drop Right Eye BID    carvedilol  12.5 mg Oral BID WC    [Held by provider] clopidogrel  75 mg Oral Daily    [Held by provider] insulin glargine  14 Units SubCUTAneous Nightly    ipratropium 0.5 mg-albuterol 2.5 mg  1 Dose Nebulization Q4H    pantoprazole  40 mg Oral Daily    gentamicin   Topical BID    timolol  1 drop Both Eyes BID    sodium bicarbonate  650 mg Oral 4x Daily    [Held by provider] aspirin  81 mg Oral Daily     Continuous Infusions:   lactated ringers 50 mL/hr at 04/03/25 0929    sodium chloride      dextrose       PRN Meds:.sulfur hexafluoride microspheres, white petrolatum, sodium chloride flush, sodium chloride, ondansetron **OR** ondansetron, polyethylene glycol, acetaminophen **OR** acetaminophen, glucose, dextrose bolus **OR** dextrose bolus, glucagon (rDNA), dextrose     Constitutional:  Awake, alert, oriented, in NAD  HEENT:  PERRLA, normocephalic, atraumatic  Respiratory:  CTA  Cardiovascular/Edema:  RRR, normal S1, normal S2  Gastrointestinal:  Soft, flat, non-distended, 
Department of Podiatry  Progress Note    Patient is schedule for surgery on Friday, 4/4/2025; bilateral lower extremity incision and drainage, possible bone biopsy, skin graft application.  Patient to be NPO at midnight.    SUBJECTIVE:  Aubrey Up is seen at bedside for B/L heel wounds. No acute events overnight. Family at bedside. Patient states some shortness of breath but otherwise denies any N/V/D/F/C/CP. No other pedal complaints at this time.     OBJECTIVE:    Scheduled Meds:   docusate sodium  100 mg Oral BID    polyethylene glycol  17 g Oral Daily    sodium chloride flush  5-40 mL IntraVENous 2 times per day    [Held by provider] heparin (porcine)  5,000 Units SubCUTAneous 3 times per day    insulin lispro  0-8 Units SubCUTAneous 4x Daily AC & HS    [Held by provider] atorvastatin  40 mg Oral Daily    brimonidine  1 drop Right Eye BID    carvedilol  12.5 mg Oral BID WC    [Held by provider] clopidogrel  75 mg Oral Daily    [Held by provider] insulin glargine  14 Units SubCUTAneous Nightly    ipratropium 0.5 mg-albuterol 2.5 mg  1 Dose Nebulization Q4H    pantoprazole  40 mg Oral Daily    gentamicin   Topical BID    timolol  1 drop Both Eyes BID    sodium bicarbonate  650 mg Oral 4x Daily    [Held by provider] aspirin  81 mg Oral Daily     Continuous Infusions:   lactated ringers 50 mL/hr at 04/03/25 0929    sodium chloride      dextrose       PRN Meds:.sulfur hexafluoride microspheres, white petrolatum, sodium chloride flush, sodium chloride, ondansetron **OR** ondansetron, polyethylene glycol, acetaminophen **OR** acetaminophen, glucose, dextrose bolus **OR** dextrose bolus, glucagon (rDNA), dextrose    No Known Allergies    BP (!) 128/56   Pulse 84   Temp 97.7 °F (36.5 °C) (Oral)   Resp 20   Ht 1.727 m (5' 8\")   Wt 93.2 kg (205 lb 7.5 oz)   SpO2 94%   BMI 31.24 kg/m²       EXAM:    VASCULAR:  DP and PT pulses are palpable, but diminished. CFT < 5 seconds B/L.  Warm to warm from the tibial 
Department of Podiatry  Progress Note    SUBJECTIVE:  Aubrey Up is seen at bedside s/p BLE I/D with skin graft substitute application.  Patient seen with family bedside.  Denies any pain to lower extremities.  Notes no complications with wound VAC.  Patient admits to some nerve pain in his left leg exacerbated by laying flat in bed all day. patient denies any N/V/D/F/C/CP. No other pedal complaints at this time. Wound vac on and running without issues at this time.    OBJECTIVE:    Scheduled Meds:   ipratropium 0.5 mg-albuterol 2.5 mg  1 Dose Inhalation 4x Daily RT    cefepime  2,000 mg IntraVENous Q24H    vancomycin  1,750 mg IntraVENous Q24H    docusate sodium  100 mg Oral BID    polyethylene glycol  17 g Oral Daily    sodium chloride flush  5-40 mL IntraVENous 2 times per day    heparin (porcine)  5,000 Units SubCUTAneous 3 times per day    insulin lispro  0-8 Units SubCUTAneous 4x Daily AC & HS    atorvastatin  40 mg Oral Daily    brimonidine  1 drop Right Eye BID    carvedilol  12.5 mg Oral BID WC    clopidogrel  75 mg Oral Daily    insulin glargine  14 Units SubCUTAneous Nightly    pantoprazole  40 mg Oral Daily    gentamicin   Topical BID    timolol  1 drop Both Eyes BID    sodium bicarbonate  650 mg Oral 4x Daily    aspirin  81 mg Oral Daily     Continuous Infusions:   sodium chloride      dextrose       PRN Meds:.ipratropium 0.5 mg-albuterol 2.5 mg, bisacodyl, white petrolatum, sulfur hexafluoride microspheres, white petrolatum, sodium chloride flush, sodium chloride, ondansetron **OR** ondansetron, polyethylene glycol, acetaminophen **OR** acetaminophen, glucose, dextrose bolus **OR** dextrose bolus, glucagon (rDNA), dextrose    No Known Allergies    /60   Pulse 63   Temp 97.8 °F (36.6 °C) (Temporal)   Resp 18   Ht 1.727 m (5' 8\")   Wt 95 kg (209 lb 7 oz)   SpO2 90%   BMI 31.84 kg/m²       EXAM:  Wound vac on and running, dressings clean dry and intact. Cap refill to the digits within 
Department of Podiatry  Progress Note    SUBJECTIVE:  Patient seen bedside for bilateral heel wounds. No acute events overnight.  Patient seen with family and wife Herlinda at bedside.  Eduardo states that she talk with Dr. Marie.  Was wondering about possible podiatry surgery if it will be happening.  Patient states he feels like his legs are getting better.  Patient denies any N/V/D/F/C/SOB/CP and has no other pedal complaints at this time.     OBJECTIVE:    Scheduled Meds:   sodium chloride flush  5-40 mL IntraVENous 2 times per day    [Held by provider] heparin (porcine)  5,000 Units SubCUTAneous 3 times per day    insulin lispro  0-8 Units SubCUTAneous 4x Daily AC & HS    [Held by provider] atorvastatin  40 mg Oral Daily    brimonidine  1 drop Right Eye BID    carvedilol  12.5 mg Oral BID WC    [Held by provider] clopidogrel  75 mg Oral Daily    [Held by provider] insulin glargine  14 Units SubCUTAneous Nightly    ipratropium 0.5 mg-albuterol 2.5 mg  1 Dose Nebulization Q4H    pantoprazole  40 mg Oral Daily    gentamicin   Topical BID    timolol  1 drop Both Eyes BID    sodium bicarbonate  650 mg Oral 4x Daily    [Held by provider] aspirin  81 mg Oral Daily     Continuous Infusions:   lactated ringers 50 mL/hr at 04/02/25 1216    sodium chloride      dextrose       PRN Meds:.sulfur hexafluoride microspheres, white petrolatum, sodium chloride flush, sodium chloride, ondansetron **OR** ondansetron, polyethylene glycol, acetaminophen **OR** acetaminophen, glucose, dextrose bolus **OR** dextrose bolus, glucagon (rDNA), dextrose    No Known Allergies    BP (!) 104/57   Pulse 70   Temp 97.5 °F (36.4 °C) (Oral)   Resp 26   Ht 1.727 m (5' 8\")   Wt 95.1 kg (209 lb 10.5 oz)   SpO2 93%   BMI 31.88 kg/m²       EXAM:    VASCULAR:  DP and PT pulses are palpable, but diminished. CFT < 5 seconds B/L.  Warm to warm from the tibial tuberosity to the distal aspect of the digits dorsally. Hair growth absent to the distal 
Head to toe performed, patients son states that the patient is to receive debridement to richard heels and appointment was scheduled for today outpatient, son would like me to leave the wound vac in place until podiatry and wound care come around. Podiatry and wound consult initiated. Son (Aubrey) updated.    
Message to Dr. Farooq dt patient needing admission orders, as well as family request to see a podiatrist and a nephrologist.   
New consult sent to Infectious Disease via perfect serve. Awaiting a call back to see who is covering.  Sheri Locke RN   
New consult sent to Palliative Care via [email or web address] serve.  Sheri Locke RN   
New consult sent to nephrology via perfect serve at this time. Thank you!  
Nurse to nurse report called to Radha Hernandez. Discharge paperwork, paola, and mar report sent with patient at discharge. Wound vac removed and wet to dry dressing changed by podiatry before DC. Boots placed on bilateral feet  
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    Mixed hyperlipidemia E78.2    H/O asbestosis Z87.09    Lumbar and sacral osteoarthritis M47.817    History of paroxysmal supraventricular tachycardia Z86.79    Need for diphtheria-tetanus-pertussis (Tdap) vaccine Z23    Vertigo R42    Pulmonary nodules R91.8    Abnormal x-ray of pelvis R93.7    Unsteady gait when walking R26.81    Abnormal radionuclide bone scan R94.8    Hip pain, chronic, right M25.551, G89.29    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, 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    History of CVA (cerebrovascular accident) Z86.73    Muscle weakness (generalized) M62.81    Lumbar radiculopathy M54.16    Lumbar pain M54.50    High prostate specific antigen (PSA) R97.20    Neuropathy G62.9    Sprain of medial collateral ligament of right knee S83.411A    Acute pain of right knee M25.561    Disorder of liver K76.9    History of prostate cancer Z85.46    NSTEMI (non-ST elevated myocardial infarction) (HCC) I21.4    Acute coronary syndrome (HCC) I24.9    Acute confusional state F05    Carotid stenosis, bilateral I65.23    Hallucinations R44.3    Acute ischemic stroke (HCC) I63.9    Hyponatremia E87.1    Pericardial effusion I31.39       SUBJECTIVE:  Aubrey Up is once again sitting at the side of his bed eating lunch.  But points out he is quite disappointed and that he wishes to go home and be with his family but is being transferred to a skilled care center where he can safely receive his intravenous antibiotics for the duration of treatment of his lower extremity bone infection.  He denies any significant shortness of breath nor chest discomfort presently.  REVIEW OF SYSTEMS:  General ROS: positive for - fatigue, malaise, and weight loss  Psychological ROS: negative for - anxiety , depression  Ophthalmic ROS: negative for - 
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    Mixed hyperlipidemia E78.2    H/O asbestosis Z87.09    Lumbar and sacral osteoarthritis M47.817    History of paroxysmal supraventricular tachycardia Z86.79    Need for diphtheria-tetanus-pertussis (Tdap) vaccine Z23    Vertigo R42    Pulmonary nodules R91.8    Abnormal x-ray of pelvis R93.7    Unsteady gait when walking R26.81    Abnormal radionuclide bone scan R94.8    Hip pain, chronic, right M25.551, G89.29    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, 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    History of CVA (cerebrovascular accident) Z86.73    Muscle weakness (generalized) M62.81    Lumbar radiculopathy M54.16    Lumbar pain M54.50    High prostate specific antigen (PSA) R97.20    Neuropathy G62.9    Sprain of medial collateral ligament of right knee S83.411A    Acute pain of right knee M25.561    Disorder of liver K76.9    History of prostate cancer Z85.46    NSTEMI (non-ST elevated myocardial infarction) (HCC) I21.4    Acute coronary syndrome (HCC) I24.9    Acute confusional state F05    Carotid stenosis, bilateral I65.23    Hallucinations R44.3    Acute ischemic stroke (HCC) I63.9    Hyponatremia E87.1    Pericardial effusion I31.39       SUBJECTIVE:  Aubrey Up is presently sitting up at the side of his bed eating with his wife and daughter present.  He notes that he feels much improved and that he denies any shortness of breath presently since undergoing thoracentesis as previously mentioned.  He denies any chest discomfort nor lightheadedness.  He is anxiously awaiting the results of his thoracentesis..  REVIEW OF SYSTEMS:  General ROS: positive for - fatigue, malaise, and weight loss  Psychological ROS: negative for - anxiety , depression  Ophthalmic ROS: negative for - decreased vision or visual distortion.  ENT ROS: 
Page sent to Dr. Marie in regards to cardiac clearance.    Call back received from Dr. Marie. Dr. Marie ok for patient to get surgery with podiatry today from cardiology standpoint.    Notified Dr. Chairez with podiatry.    EJ LAMA RN    
Palliative Medicine Social Work     Patient Name: Aubrey Up    Age: 84 y.o.    Marital Status:     [place] Status: unknown    Next of Kin: wife Rosa Isela Up    Additional Support: son Aubrey, daughter Verona    Minor Children: no    Advanced Directives: requested copy of HCPOA and Living Will son states names pts wife as main agent and himself as alternate     Confirm Code Status: full - to be reviewed    Current Goals of Care: live longer, improve or maintain function/ quality of life, remain at home and continue current management    Mental Health History: no    Substance Abuse:no    Indications of Abuse/Neglect: no    Financial Concerns: no    Living Situation: pt lives with his wife. Family provides 24 hour supervision at home. He was at [facility] in November after a mini stroke per son. Pt is active with [facility]. Son states he was doing well but wounds on his feet have decreased his mobility.    Physical Care Needs Met: pt heard of hearing    Emotional Needs Met: time spent with son providing support through active listening     Assessment: 85 yo  male transferred from [facility] for evaluation of pericardial effusion. LSW met at bedside with pt and his son Aubrey. Pts wife is arriving soon and will be able to meet with team to discus goals of care. Copy of advance directives requested       
Patient arrived via bed with transport and family to Radiology department for left thoracentesis. Allergies, home medications, H&P and fasting instructions reviewed with patient. Vital signs taken. Procedural instructions given, questions answered, understanding expressed and consent signed. Patient given fluoroscopy education, no questions at this time.  
Patient voided 350 mL into urinal. Bladder scan obtained after voided. 376 mL on bladder scan. Notified [facility] via perfect serve.    EJ LAMA RN    
Pharmacy Consultation Note  (Antibiotic Dosing and Monitoring)    Initial consult date: 4/5/2025  Consulting physician/provider: Edison Bloom MD.   Drug: Vancomycin  Indication: Skin and Soft Tissue Infection.     Age/  Gender Height Weight IBW  Allergy Information   84 y.o./male 172.7 cm (5' 8\") 87 kg (191 lb 12.8 oz)     Ideal body weight: 68.4 kg (150 lb 12.7 oz)  Adjusted ideal body weight: 79 kg (174 lb 4 oz)   Patient has no known allergies.      Renal Function:  Recent Labs     04/04/25  0637 04/04/25  1223 04/04/25 2024   BUN 73* 73* 71*   CREATININE 2.0* 2.0* 1.9*       Intake/Output Summary (Last 24 hours) at 4/5/2025 0920  Last data filed at 4/5/2025 0432  Gross per 24 hour   Intake 150 ml   Output 500 ml   Net -350 ml       Vancomycin Monitoring:  Trough:  No results for input(s): \"VANCOTROUGH\" in the last 72 hours.  Random:  No results for input(s): \"VANCORANDOM\" in the last 72 hours.    Vancomycin Administration Times:  Recent vancomycin administrations                     vancomycin (VANCOCIN) injection (mg) 1,000 mg Given 04/04/25 1757                    Assessment:  Patient is a 84 y.o. male who has been initiated on vancomycin  Estimated Creatinine Clearance: 33 mL/min (A) (based on SCr of 1.9 mg/dL (H)).  To dose vancomycin, pharmacy will be dosing based off of levels because of patient's renal impairment/insufficiency.   Scr 1.9mg/dL, recent baseline 1.8-2.2 mg/dL   Vanco 1000mg x1 given last night (4/4) at 17:57. Vanco random level is <4.0 mg/dL at 16:07. Scr improving, 1.8mg/dL.     Plan:  Vanco 1750mg Q24H.   Will continue to monitor renal function.     Allegra Pedraza, PharmD, BCIDP, BCPS 4/5/2025 6:15 PM  [phone]  
Pharmacy Consultation Note  (Antibiotic Dosing and Monitoring)    Initial consult date: 4/5/2025  Consulting physician/provider: Edison Bloom MD.   Drug: Vancomycin  Indication: Skin and Soft Tissue Infection.     Age/  Gender Height Weight IBW  Allergy Information   84 y.o./male 172.7 cm (5' 8\") 87 kg (191 lb 12.8 oz)     Ideal body weight: 68.4 kg (150 lb 12.7 oz)  Adjusted ideal body weight: 79 kg (174 lb 4 oz)   Patient has no known allergies.      Renal Function:  Recent Labs     04/04/25  1223 04/04/25 2024 04/05/25  1008   BUN 73* 71* 69*   CREATININE 2.0* 1.9* 1.8*       Intake/Output Summary (Last 24 hours) at 4/6/2025 1001  Last data filed at 4/6/2025 0820  Gross per 24 hour   Intake 600 ml   Output 1500 ml   Net -900 ml       Vancomycin Monitoring:  Trough:  No results for input(s): \"VANCOTROUGH\" in the last 72 hours.  Random:    Recent Labs     04/05/25  1607   VANCORANDOM <4.0*       Recent vancomycin administrations                     vancomycin (VANCOCIN) 1750 mg in sodium chloride 0.9 % 500 mL IVPB (mg) 1,750 mg New Bag 04/05/25 2021    vancomycin (VANCOCIN) injection (mg) 1,000 mg Given 04/04/25 1757                    Assessment:  Patient is a 84 y.o. male who has been initiated on vancomycin  Estimated Creatinine Clearance: 35 mL/min (A) (based on SCr of 1.8 mg/dL (H)).  To dose vancomycin, pharmacy will be dosing based off of levels because of patient's renal impairment/insufficiency.       Plan:  Continue Vanco 1750mg Q24H.   Vanco trough will be due (4/7) before dose.       Allegra Pedraza, PharmD, BCIDP, BCPS 4/6/2025 1:52 PM  [phone]  
Pharmacy Consultation Note  (Antibiotic Dosing and Monitoring)    Initial consult date: 4/5/2025  Consulting physician/provider: Edison Bloom MD.   Drug: Vancomycin  Indication: Skin and Soft Tissue Infection.     Age/  Gender Height Weight IBW  Allergy Information   84 y.o./male 172.7 cm (5' 8\") 87 kg (191 lb 12.8 oz)     Ideal body weight: 68.4 kg (150 lb 12.7 oz)  Adjusted ideal body weight: 79 kg (174 lb 4 oz)   Patient has no known allergies.      Renal Function:  Recent Labs     04/05/25  1008 04/06/25  0949 04/07/25  0525   BUN 69* 62* 59*   CREATININE 1.8* 1.8* 1.6*       Intake/Output Summary (Last 24 hours) at 4/7/2025 0943  Last data filed at 4/7/2025 0634  Gross per 24 hour   Intake 860.46 ml   Output 775 ml   Net 85.46 ml       Vancomycin Monitoring:  Trough:  No results for input(s): \"VANCOTROUGH\" in the last 72 hours.  Random:    Recent Labs     04/05/25  1607   VANCORANDOM <4.0*           Recent vancomycin administrations                     vancomycin (VANCOCIN) 1750 mg in sodium chloride 0.9 % 500 mL IVPB (mg) 1,750 mg New Bag 04/06/25 1754     1,750 mg New Bag 04/05/25 2021    vancomycin (VANCOCIN) injection (mg) 1,000 mg Given 04/04/25 1757                      Assessment:  Patient is a 84 y.o. male who has been initiated on vancomycin  Estimated Creatinine Clearance: 40 mL/min (A) (based on SCr of 1.6 mg/dL (H)).  To dose vancomycin, pharmacy will be targeting a trough between 10-20mg/dL.   4/8: SCr 1.6.     Plan:  Continue Vanco 1750mg Q24H.   Will check a trough tonight 4/7 prior to 1800 dose - hold for level > 20      Aditya Rodriguez RPH, 4/7/2025 9:43 AM   or [phone]  
Pharmacy Consultation Note  (Antibiotic Dosing and Monitoring)    Initial consult date: 4/5/2025  Consulting physician/provider: Edison Bloom MD.   Drug: Vancomycin  Indication: Skin and Soft Tissue Infection.     Age/  Gender Height Weight IBW  Allergy Information   84 y.o./male 172.7 cm (5' 8\") 87 kg (191 lb 12.8 oz)     Ideal body weight: 68.4 kg (150 lb 12.7 oz)  Adjusted ideal body weight: 83.9 kg (185 lb 0.2 oz)   Patient has no known allergies.      Renal Function:  Recent Labs     04/06/25  0949 04/07/25  0525 04/08/25  0441   BUN 62* 59* 54*   CREATININE 1.8* 1.6* 1.5*       Intake/Output Summary (Last 24 hours) at 4/8/2025 0744  Last data filed at 4/8/2025 0417  Gross per 24 hour   Intake 520 ml   Output 675 ml   Net -155 ml     Vancomycin Monitoring:  Trough:    Recent Labs     04/07/25  1712   VANCOTROUGH 14.6     Random:    Recent Labs     04/05/25  1607   VANCORANDOM <4.0*       Vancomycin Administration Times:  Recent vancomycin administrations                     vancomycin (VANCOCIN) 1750 mg in sodium chloride 0.9 % 500 mL IVPB (mg) 1,750 mg New Bag 04/07/25 1930     1,750 mg New Bag 04/06/25 1754     1,750 mg New Bag 04/05/25 2021                  Assessment:  Patient is a 84 y.o. male who has been initiated on vancomycin  Estimated Creatinine Clearance: 45 mL/min (A) (based on SCr of 1.5 mg/dL (H)).  To dose vancomycin, pharmacy will be targeting a trough between 15-20mg/dL.   Patient received topical vancomycin 1000 mg in OR on 4/4. Vancomycin 1750 mg IV every 24 hours started 4/5.   4/8: Vancomycin trough (prior to 3rd dose) = 14.6 mg/dL.     Plan:  Continue vancomycin 1750 mg IV every 24 hours.   Check trough level on 4/9 (needs ordered) prior to 5th dose.       Aracelis Montero [facility], 4/8/2025 7:44 AM  Ext [phone] or Ext [phone]      
Physical Therapy  Physical Therapy Initial Assessment     Name: Aubrey Up  : 1940  MRN: [id number]      Date of Service: 2025    Evaluating PT:  Bi Talavera PT, DPT    Room #:  6412/6412-A  Diagnosis:  Hyponatremia [E87.1]  PMHx/PSHx:  DM II, HLD, CVA  Procedure/Surgery:  s/p thoracentesis , s/p BLE I/D with skin graft substitute application, wound vac application right foot   Precautions:  WBA T BLE with B CAM boots, wound vac RLE, fall risk, bed alarm, contact  Equipment Needs:  TBD    SUBJECTIVE:    Pt lives with spouse in a 1 story home with 1 steps to enter and no handrail.  Bed/bath is on 1st floor.  Pt ambulated with ww PTA.    OBJECTIVE:   Initial Evaluation  Date: 25 Treatment Short Term/ Long Term   Goals   AM-PAC 6 Clicks      Was pt agreeable to Eval/treatment? yes     Does pt have pain? No pain     Bed Mobility  Rolling: max A  Supine to sit: max A  Sit to supine: max A  Scooting: max A  Rolling: SBA  Supine to sit: SBA  Sit to supine: SBA  Scooting: SBA   Transfers Sit to stand: mod A  Stand to sit: mod A  Stand pivot: NT  Sit to stand: SBA  Stand to sit: SBA  Stand pivot: SBA with AAD   Ambulation    2' with ww mod A  (side steps to HOB)  20'+ with AAD SBA   Stair negotiation: ascended and descended  NT  1 steps with AAD and no handrail SBA     Strength/ROM:   BLE grossly 3+/5  BLE AROM WFL  B ankles NT    Balance:   Static Sitting: SBA  Dynamic Sitting: SBA  Static Standing: min A with ww  Dynamic Standing: mod A with ww    Pt is A & O x 3  Sensation:  Pt reports numbness in B feet/ankles  Edema:  unremarkable    Vitals:  SpO2 and HR were stable during session while on 1L of O2    Therapeutic Exercises:    Bed mobility: supine<>sit, cued for EOB positioning  Transfers: STS x2, cued for hand placement and postural correction  Ambulation: 2' with ww  BLE AROM    Patient education  Pt educated on role of PT, importance of functional mobility during hospital stay, safety 
Pt arrived to PACU with RLE wound vac @ 200mmHg, roll gauze, ace clean, dry, intact, heel protector on; LLE roll gauze, ace clean, dry, intact, heel protector on  
Pulmonology consult sent to Dr. Lucas   
Spiritual Health History and Assessment/Progress Note  [facility] [place]    (P) Follow-up, Spiritual/Emotional Needs,  ,  ,      Name: Aubrey Up MRN: [id number]    Age: 84 y.o.     Sex: male   Language: English   Latter-day: None   Hyponatremia     Date: 4/4/2025                           Spiritual Assessment began in [facility]        Referral/Consult From: (P) Palliative Care, Rounding   Encounter Overview/Reason: (P) Follow-up, Spiritual/Emotional Needs  Service Provided For: (P) Patient and family together    Juju, Belief, Meaning:   Patient identifies as spiritual, is connected with a juju tradition or spiritual practice, and has beliefs or practices that help with coping during difficult times  Family/Friends identify as spiritual, are connected with a juju tradition or spiritual practice, and have beliefs or practices that help with coping during difficult times      Importance and Influence:  Patient has spiritual/personal beliefs that influence decisions regarding their health  Family/Friends have spiritual/personal beliefs that influence decisions regarding the patient's health    Community:  Patient is connected with a spiritual community and feels well-supported. Support system includes: Spouse/Partner, Children, Juju Community, and Extended family  Family/Friends are connected with a spiritual community: and feel well-supported. Support system includes: Spouse/Partner, Children, Juju Community, and Extended family    Assessment and Plan of Care:     Patient Interventions include: Facilitated expression of thoughts and feelings, Explored spiritual coping/struggle/distress, Engaged in theological reflection, Affirmed coping skills/support systems, and Facilitated life review and/ or legacy  Family/Friends Interventions include: Facilitated expression of thoughts and feelings, Explored spiritual coping/struggle/distress, Engaged in theological reflection, Affirmed coping 
Spiritual Health History and Assessment/Progress Note  [facility] [place]    (P) Initial Encounter, Spiritual/Emotional Needs,  ,  ,      Name: Aubrey Up MRN: [id number]    Age: 84 y.o.     Sex: male   Language: English   Mormonism: None   Hyponatremia     Date: 4/2/2025                           Spiritual Assessment began in [facility]        Referral/Consult From: (P) Rounding, Nurse (Consult)   Encounter Overview/Reason: (P) Initial Encounter, Spiritual/Emotional Needs  Service Provided For: (P) Patient and family together    Juju, Belief, Meaning:   Patient unable to assess at this time  Family/Friends identify as spiritual, are connected with a juju tradition or spiritual practice, and have beliefs or practices that help with coping during difficult times      Importance and Influence:  Patient has spiritual/personal beliefs that influence decisions regarding their health. According to the patient's wife.  Family/Friends have spiritual/personal beliefs that influence decisions regarding the patient's health    Community:  Patient is connected with a spiritual community and feels well-supported. Support system includes: Spouse/Partner, Children, Juju Community, and Extended family  Family/Friends are connected with a spiritual community: feels well supported by spouse, children, juju community and extended family    Assessment and Plan of Care:     Patient Interventions include: Other: Unable to assess at the time of visit.  Family/Friends Interventions include: Facilitated expression of thoughts and feelings, Explored spiritual coping/struggle/distress, Engaged in theological reflection, and Affirmed coping skills/support systems    Patient Plan of Care: Other: The  will follow as needed.  Family/Friends Plan of Care: Other: The  will follow as needed.    Electronically signed by Chaplain Victoria on 4/2/2025 at 4:43 PM    
Vascular Access Procedure Note    Procedure Date:   4/8/2025    Pre-procedure Verification/Time-Out:  The proposed risks versus benefits of this procedure were discussed in detail by the physician with  son, Ming Dang consent was obtained from son, Aubrey Up. Relevant documentation was reviewed prior to procedure including signed consent form and medications.   All necessary equipment for procedure is available at time of procedure yes.  An audible time out was done at 0930AM by team members, correctly identifying patients name, medical record number, correct side, correct site, and correct procedure to be performed with registered nurse members of the procedure team all in agreement.        Indication for Procedure:   Reason for Insertion: intravenous antibiotics    ASA Assessment (Required for Moderate & Deep Sedation):      Procedure:   Reason for Consultation: power PICC line    Clinician Performing Procedure:   NASH Odonnell RN    Assistant:  none    Sedation:   Analgesia Used: lidocaine 1%    Procedure Details/Findings:  Catheter [place] Size: 5.5  Lot Number: [id number]  Product #: [id number]  Expiration Date: 10/09/2025  Maximum Barrier Precautions: cap, eye shield, full body drape, gloves, gown, handwashing, and mask  Skin Prep: chlorhexidine  Technique: modified seldinger and ultrasound guided with VPS  Attempts: 1  Exposed (cm): 2  Total (cm): 40  Placement Site: right basilic vein.  Vessel Size: 0.50  Dressing: securement device, transparent dressing, and biopatch  Blood Return: Yes   Ultrasound Guidance: Yes   Arm Circumference Mid-Bicep (cm): 27  Chest X-Ray Ordered: VasoNova VPS  End Placement: caj/lower svc    Complications:   none     Post-operative Condition:  stable  Patient Tolerated Procedure: well     Comments/Post-operative Education:   Post Procedure Interventions: patient verbalized understanding of education    Demetris Odonnell RN  04/08/25  10:26 AM     
Wife at bedside , states she assumes total care for patient around the clock for the last 56 years, cuts food up , dresses and feeds him daily. Plan for npo at midnight 4/4/25 for I/d bilateral heals ,   
Wound care: Podiatry following, signed off. Sasha Segura RN    
Wound vac dropped off outside of [facility]. Spoke with staff and they said to leave it outside of door.   
lesions      DATA:    CBC:   Lab Results   Component Value Date/Time    WBC 9.0 04/01/2025 05:42 AM    RBC 2.91 04/01/2025 05:42 AM    HGB 8.6 04/01/2025 05:42 AM    HCT 25.0 04/01/2025 05:42 AM    MCV 86.2 04/01/2025 05:42 AM    MCH 29.7 04/01/2025 05:42 AM    MCHC 34.4 04/01/2025 05:42 AM    RDW 17.3 04/01/2025 05:42 AM     04/01/2025 05:42 AM    MPV 8.0 04/01/2025 05:42 AM     CMP:    Lab Results   Component Value Date/Time     04/06/2025 09:49 AM    K 4.5 04/06/2025 09:49 AM    CL 98 04/06/2025 09:49 AM    CO2 23 04/06/2025 09:49 AM    BUN 62 04/06/2025 09:49 AM    CREATININE 1.8 04/06/2025 09:49 AM    GFRAA >60 12/14/2021 02:47 PM    AGRATIO 1.2 02/08/2022 12:40 PM    LABGLOM 37 04/06/2025 09:49 AM    LABGLOM >60 07/19/2023 11:06 AM    GLUCOSE 156 04/06/2025 09:49 AM    GLUCOSE 274 04/01/2025 11:17 AM    CALCIUM 8.7 04/06/2025 09:49 AM    BILITOT 0.7 04/01/2025 04:42 PM    ALKPHOS 159 04/01/2025 04:42 PM    AST 24 04/01/2025 04:42 PM    ALT 21 04/01/2025 04:42 PM     Magnesium:    Lab Results   Component Value Date/Time    MG 1.6 04/01/2025 04:42 PM     Phosphorus:    Lab Results   Component Value Date/Time    PHOS 4.0 04/02/2025 04:34 AM     Radiology Review:         BRIEF SUMMARY OF INITIAL CONSULT:     Briefly Mr. Aubrey Up is an 84-year-old male with a PMH of CKD stage IV baseline creatinine 2.0-2.3 mg/dL, due to ischemic ATN with previous need of renal replacement therapy (CRRT and transition to hemodialysis) with recovery of renal function enough to discontinue dialysis, dialysis membrane allergy, HTN, type II DM, paroxysmal SVT, ANIBAL on CPAP, asbestosis, prostate cancer, CVA, who was admitted on 4/1/2025 after being transferred from [facility] for evaluation of a pericardial effusion.  Lab work revealed sodium 127, creatinine 2.3 mg/dL, proBNP 2555, reason for this consultation.  Patient states he has not had much of an appetite.  Patient's son states his weight has been up and down 
negative  Hematological and Lymphatic ROS: negative  Endocrine: no heat or cold intolerance and no polyphagia, polydipsia, or polyuria  Respiratory ROS: positive for - shortness of breath  Cardiovascular ROS: positive for - shortness of breath.  Gastrointestinal ROS: no abdominal pain, change in bowel habits, or black or bloody stools  Genito-Urinary ROS: no nocturia, dysuria, trouble voiding, frequency or hematuria  Musculoskeletal ROS: negative for- myalgias, arthralgias, or claudication  Neurological ROS: no TIA or stroke symptoms otherwise no significant change in symptoms or problems since yesterday as documented in previous progress notes.    SCHEDULED MEDICATIONS:   docusate sodium  100 mg Oral BID    polyethylene glycol  17 g Oral Daily    sodium chloride flush  5-40 mL IntraVENous 2 times per day    [Held by provider] heparin (porcine)  5,000 Units SubCUTAneous 3 times per day    insulin lispro  0-8 Units SubCUTAneous 4x Daily AC & HS    [Held by provider] atorvastatin  40 mg Oral Daily    brimonidine  1 drop Right Eye BID    carvedilol  12.5 mg Oral BID WC    [Held by provider] clopidogrel  75 mg Oral Daily    [Held by provider] insulin glargine  14 Units SubCUTAneous Nightly    ipratropium 0.5 mg-albuterol 2.5 mg  1 Dose Nebulization Q4H    pantoprazole  40 mg Oral Daily    gentamicin   Topical BID    timolol  1 drop Both Eyes BID    sodium bicarbonate  650 mg Oral 4x Daily    [Held by provider] aspirin  81 mg Oral Daily       VITAL SIGNS:                                                                                                                          /65  Pulse 68  Temp 97.2°F (36.2 °C) (Oral)   Resp 13  Ht 1.727 m (5' 8\")   Wt 95 kg (209 lb 7 oz)   SpO2 94%   BMI 31.84 kg/m²   Patient Vitals for the past 96 hrs (Last 3 readings):   Weight   04/03/25 0400 93.2 kg (205 lb 7.5 oz)   04/02/25 0325 95.1 kg (209 lb 10.5 oz)     OBJECTIVE:    HEENT: PERRL, EOM  Intact; sclera non-icteric, 
aeruginosa, Corynebacterium striatum   Surgical Cultures left heel soft tissue, 4/4/2025 Pseudomonas aeruginosa, corynebacterium striatum   Surgical culture soft tissue right great toe 4/4/25 Corynebacterium striatum   Surgical culture left heel bone 4/4/2025 no growth 2 days  Surgical culture right great toe bone 04/04/2025 no growth 2 days  Body fluid culture thoracentesis 04/04/2025 no growth 2 days     IMPRESSION:      Chronic non healing bilateral heel ulcer , osteomyelitis   S/p I & D with bone biopsy right heel, left heel, right distal great toe, application of skin substitute ,bilateral feet, application of wound VAC right foot  04/04/2025   DM         RECOMMENDATIONS:         Continue on  Vancomycin   Continue cefepime  Wound care  Follow bone biopsies and surgical cultures.     Discussed treatment of osteomyelitis and IV therapy with daughter.    Marcy Willard, NAZARIO - CNP  1:04 PM  4/6/2025  
include carvedilol, potassium chloride and lisinopril.    IMPRESSION/RECOMMENDATIONS:     CKD stage IV, baseline creatinine 2.0-2.3 mg/dL, renal function stable  Hypervolemic hyponatremia 2/2 acute decompensated heart failure, sodium 136, fluid striction 1 L, resolved  Low bicarbonate levels, obtain venous pH, on sodium bicarbonate tablets  HFpEF 63% repeat echo shows EF 45-50% with a moderate pericardial effusion and a large left pleural effusion, proBNP 2555,   HTN, on carvedilol  Type II DM, on SSI  CAD  BPH  Hyperlipidemia  ANIBAL  History of asbestos  History of pulmonary nodules  History of prostate cancer    Plan:    Fluid restriction 1 L  Continue to monitor renal function  Continue to monitor blood pressure      Electronically signed by NAZARIO HAMM NP on 4/5/2025 at 11:22 AM   MD:  I saw and evaluated the patient, performing the key elements of the service. I discussed the findings, assessment and plan with NP and agree with her findings and plans as documented in her note.  
mg, bisacodyl, white petrolatum, sulfur hexafluoride microspheres, white petrolatum, sodium chloride flush, sodium chloride, ondansetron **OR** ondansetron, polyethylene glycol, acetaminophen **OR** acetaminophen, glucose, dextrose bolus **OR** dextrose bolus, glucagon (rDNA), dextrose    RADIOLOGY:  IR GUIDED THORACENTESIS PLEURAL   Final Result   Successful ultrasound guided thoracentesis.         XR CHEST PORTABLE   Final Result   1. Significant interval improvement in left effusion and atelectasis.   2. No signs of pneumothorax.         XR CHEST PORTABLE   Final Result   1. Cardiomegaly with pulmonary vascular congestion.   2. Increasing retrocardiac opacity.   3. Small left pleural effusion.         XR FOOT RIGHT (MIN 3 VIEWS)   Final Result   1.  There are severe osteopenia in the multiple phalanxes of the right and of   the left toe which had increase since previous examinations January 28, 2025..      2.  Due the severe progressive degree of osteopenia present cannot exclude a   component of osteolysis or osteomyelitis particular in the mid/distal   phalanxes of the 2nd, 3rd, 4th and 5th toes of the right foot.      3.  Due the severe progressive degree of osteopenia cannot exclude a   component of osteolysis or osteomyelitis particular in the distal phalanxes   of 2nd, 4th and 5th toes of the left foot.      4.  Patient had previous debridement and partial peripheral ostectomy of the   right calcaneus.         XR ANKLE RIGHT (MIN 3 VIEWS)   Final Result   1.  There are severe osteopenia in the multiple phalanxes of the right and of   the left toe which had increase since previous examinations January 28, 2025..      2.  Due the severe progressive degree of osteopenia present cannot exclude a   component of osteolysis or osteomyelitis particular in the mid/distal   phalanxes of the 2nd, 3rd, 4th and 5th toes of the right foot.      3.  Due the severe progressive degree of osteopenia cannot exclude a 
(addressing posture, balance and activity tolerance; impacting ADLs and functional activity; impacting ADLs and functional activity) and side steps with w/w -  skilled cuing on hand placement, posture, body mechanics, energy conservation techniques and safety.  Therapist facilitated self-care retraining: UB/LB self-care tasks (gown, CAM boots), toileting hygiene task and grooming tasks while cuing on modified techniques, posture, safety and energy conservation techniques. Skilled monitoring of HR, O2 sats and pts response to treatment.        Rehab Potential:  Good for established goals     Patient / Family Goal: Regain Levy     Patient and/or family were instructed on functional diagnosis, prognosis/goals and OT plan of care. Demonstrated fair understanding.     Eval Complexity: Low    Time In: 1045  Time Out: 1110  Total Treatment Time: 10 minutes    Min Units   OT Eval Low 97165  x  1   OT Eval Medium 32836      OT Eval High 62363      OT Re-Eval 24815       Therapeutic Ex 68657       Therapeutic Activities 48056  2     ADL/Self Care 48467  8  1   Orthotic Management 90883       Manual 78959     Neuro Re-Ed 22378       Non-Billable Time          Evaluation Time additionally includes thorough review of current medical information, gathering information on past medical history/social history and prior level of function, interpretation of standardized testing/informal observation of tasks, assessment of data and development of plan of care and goals.          Ja Mccartney OTR/L #[id number]    
  Ht 1.727 m (5' 8\")   Wt 95 kg (209 lb 7 oz)   SpO2 95%   BMI 31.84 kg/m²     LABS:    No results for input(s): \"WBC\", \"HGB\", \"HCT\", \"PLT\" in the last 72 hours.       Recent Labs     04/06/25  0949      K 4.5   CL 98   CO2 23   BUN 62*   CREATININE 1.8*        No results for input(s): \"INR\", \"APTT\" in the last 72 hours.    Invalid input(s): \"PROT\"        ASSESSMENT:  -Status post incision and debridement with application of split-thickness skin graft and wound VAC  - Full-thickness ulceration, left heel, POA, chronic  - Full-thickness ulceration right heel, POA, chronic  - Full-thickness ulceration, right great toe, POA, stable  - Acute acute osteomyelitis of the right foot, other  - Type 2 diabetes mellitus  - Venous insufficiency    PLAN:  - Patient was examined and evaluated. Labs, images and ancillary service notes reviewed.   - Pain Control: IV and PO  -ABX per ID -currently on Vanco mycin and cefepime  - WBC: 9.0   -Surgical cultures pending  - Vascular: Vascular duplex carotid exhibits severe stenosis in left internal carotid artery  - X-rays: Bilateral foot and ankle x-rays ordered for patient displays severe osteopenia in the digits, cannot rule out osteomyelitis of the digits at this time.  Clinically no concern other than right first digit.  - Dressing:Post operative dressings to remain intact until outpatient followup appointment with Dr. Hastings and .  Wound VAC to right lower extremity to remain on and running 200 mg mercury continuous pressure  - WB Status: WBAT in CAM boots to bilateral lower extremity    - Will continue to follow patient while they are in-house.  - Discussed patient with Dr. Hastings and Dr. Gross      
01:15 AM    RBCUA MODERATE 05/17/2021 08:52 PM    MUCUS SMALL 05/17/2021 08:52 PM    BACTERIA 3+ 10/16/2024 01:15 AM    CLARITYU Clear 06/08/2021 09:31 AM    LEUKOCYTESUR SMALL 10/16/2024 01:15 AM    UROBILINOGEN 0.2 10/16/2024 01:15 AM    BILIRUBINUR NEGATIVE 10/16/2024 01:15 AM    BLOODU Negative 06/08/2021 09:31 AM    GLUCOSEU 100 10/16/2024 01:15 AM       ABG:  No results found for: \"HRF7OZE\", \"BEART\", \"Y1TRINOK\", \"PHART\", \"THGBART\", \"ECI5KYW\", \"PO2ART\", \"ZDH7LRY\"    MICROBIOLOGY:    Wound Culture -    Bone cx -      .BONE      Special Requests R HEEL BONE    Direct Exam Gram stain performed by tissue touch prep     NO Polymorphonuclear leukocytes     RARE EPITHELIAL CELLS     NO ORGANISMS SEEN    Culture PSEUDOMONAS AERUGINOSA Identification by MALDI-TOF LIGHT GROWTH For susceptibility, refer to previous culture. [id number] 4/4/25 2035 Abnormal      CORYNEBACTERIUM STRIATUM Identification by MALDI-TOF RARE GROWTH No further workup Abnormal    Culture, Surgical [[id number]] (Abnormal) Collected: 04/04/25 1811       Susceptibility    Pseudomonas aeruginosa (1)    Antibiotic Interpretation OPHELIA Method Status    cefepime Sensitive 2 BACTERIAL SUSCEPTIBILITY PANEL OPHELIA Final    gentamicin Sensitive 2 BACTERIAL SUSCEPTIBILITY PANEL OPHELIA Final    levofloxacin Sensitive 1 BACTERIAL SUSCEPTIBILITY PANEL OPHELIA Final    meropenem Sensitive 1 BACTERIAL SUSCEPTIBILITY PANEL OPHELIA Final    piperacillin-tazobactam Sensitive 8 BACTERIAL SUSCEPTIBILITY PANEL OPHELIA Final    tobramycin Sensitive <=1 BACTERIAL SUSCEPTIBILITY PANEL OPHELIA Final               IMPRESSION:     Chronic non healing bilateral heel ulcer , osteomyelitis (right ) s/p I & D and bone biopsy - bone cx ( Pseudomonas,Corynebacter )         RECOMMENDATIONS:      IV vancomycin 1750 mg q 24 hrs and cefepime 2 grams IV q 12 hrs for 6 weeks   Follow  labs   ID follow up in  1-2 weeks     
EPITHELIAL CELLS    RARE Polymorphonuclear leukocytes    NO ORGANISMS SEEN   Culture PSEUDOMONAS AERUGINOSA LIGHT GROWTH Abnormal     CORYNEBACTERIUM STRIATUM Identification by MALDI-TOF LIGHT GROWTH Abnormal    [facility]        Susceptibility    Pseudomonas aeruginosa (1)    Antibiotic Interpretation OPHELIA Method Status    cefepime Sensitive 2 BACTERIAL SUSCEPTIBILITY PANEL OPHELIA Final    gentamicin Sensitive 2 BACTERIAL SUSCEPTIBILITY PANEL OPHELIA Final    levofloxacin Sensitive 1 BACTERIAL SUSCEPTIBILITY PANEL OPHELIA Final    meropenem Sensitive 1 BACTERIAL SUSCEPTIBILITY PANEL OPHELIA Final    piperacillin-tazobactam Sensitive 8 BACTERIAL SUSCEPTIBILITY PANEL OPHELIA Final    tobramycin Sensitive <=1 BACTERIAL SUSCEPTIBILITY PANEL OPHELIA Final               IMPRESSION:     Chronic non healing bilateral heel ulcer , osteomyelitis   DM       RECOMMENDATIONS:      Hoof off abx for now    Wound care  Bone biopsy ( today  ) - start abx post biopsy - vanco and cefepime     
RARE EPITHELIAL CELLS    RARE Polymorphonuclear leukocytes    NO ORGANISMS SEEN   Culture PSEUDOMONAS AERUGINOSA LIGHT GROWTH Abnormal     CORYNEBACTERIUM STRIATUM Identification by MALDI-TOF LIGHT GROWTH Abnormal    [facility]        Susceptibility    Pseudomonas aeruginosa (1)    Antibiotic Interpretation OPHELIA Method Status    cefepime Sensitive 2 BACTERIAL SUSCEPTIBILITY PANEL OPHELIA Final    gentamicin Sensitive 2 BACTERIAL SUSCEPTIBILITY PANEL OPHELIA Final    levofloxacin Sensitive 1 BACTERIAL SUSCEPTIBILITY PANEL OPHELIA Final    meropenem Sensitive 1 BACTERIAL SUSCEPTIBILITY PANEL OPHELIA Final    piperacillin-tazobactam Sensitive 8 BACTERIAL SUSCEPTIBILITY PANEL OPHELIA Final    tobramycin Sensitive <=1 BACTERIAL SUSCEPTIBILITY PANEL OPHELIA Final               IMPRESSION:     Chronic non healing bilateral heel ulcer , osteomyelitis   DM       RECOMMENDATIONS:      Hoof off abx for now    Wound care  Bone biopsy (appreciated podiatry consult ) - start abx post biopsy     
right knee S83.411A    Acute pain of right knee M25.561    Disorder of liver K76.9    History of prostate cancer Z85.46    NSTEMI (non-ST elevated myocardial infarction) (HCC) I21.4    Acute coronary syndrome (HCC) I24.9    Acute confusional state F05    Carotid stenosis, bilateral I65.23    Hallucinations R44.3    Acute ischemic stroke (HCC) I63.9    Hyponatremia E87.1    Pericardial effusion I31.39       RECOMMENDATIONS:  I have once again reviewed the potential cardiovascular risk of anticipated surgery with both the patient and his wife present and he indicates he fully understands and wishes to proceed based upon the indications.,  CV risk and benefits of proceeding.  Will follow postoperatively and continue to closely monitor his blood pressure.  Would also recommend strict DVT prophylaxis be maintained.  Hopefully analysis of pleural fluid will give us an appropriate idea as to the etiology of his pericardial effusion as well.    I have spent more than 25 minutes face to face with Aubrey Up and reviewing notes and laboratory data, with greater than 50% of this time instructing and counseling the patient and his family members face to face regarding my findings and recommendations and I have answered all questions as posed to me by Mr. Up and his family members present including his wife.    Santy Marie, DO FACP,FACC,[facility]      NOTE:  This report was transcribed using voice recognition software.  Every effort was made to ensure accuracy; however, inadvertent computerized transcription errors may be present        
DC  WE ARE COVERING FOR [facility]   This plan of care was reviewed in collaboration with Dr. Snowden    Electronically signed by NAZARIO Munoz - CNP on 4/7/2025 at 8:56 AM        I personally saw, examined, and cared for the patient. I performed the substantive portion of the visit. Labs, medications, radiographs reviewed. I agree with history exam and plans detailed in NP note.    CXR shows improvement.   Reviewed with spouse.    Outpatient f/u with [facility].    Alex Snowden, DO              
with Dr. Snowden    Electronically signed by NAZARIO Pineda CNP on 4/8/2025 at 12:56 PM      I personally saw, examined, and cared for the patient. I performed the substantive portion of the visit. Labs, medications, radiographs reviewed. I agree with history exam and plans detailed in NP note.        Alex Snowden, DO    
ondansetron **OR** ondansetron, polyethylene glycol, acetaminophen **OR** acetaminophen, glucose, dextrose bolus **OR** dextrose bolus, glucagon (rDNA), dextrose    +++++++++++++++++++++++++++++++++++++++++++++++++  Edison Bloom MD    [place], [place]  +++++++++++++++++++++++++++++++++++++++++++++++++  NOTE: This report was transcribed using voice recognition software. Every effort was made to ensure accuracy; however, inadvertent computerized transcription errors may be present.    I can be reached through [employer].    
solution 1 Dose  1 Dose Nebulization Q4H Milanes Marino, Maria, MD   1 Dose at 04/03/25 0945    pantoprazole (PROTONIX) tablet 40 mg  40 mg Oral Daily Milanes Marino, Maria, MD   40 mg at 04/03/25 0907    gentamicin (GARAMYCIN) 0.1 % ointment   Topical BID Pierre Sprague DPM   Given at 04/03/25 0929    timolol (TIMOPTIC) 0.5 % ophthalmic solution 1 drop  1 drop Both Eyes BID Milanes Marino, Maria, MD   1 drop at 04/03/25 0907    sodium bicarbonate tablet 650 mg  650 mg Oral 4x Daily Milanes Marino, Maria, MD   650 mg at 04/03/25 0907    [Held by provider] aspirin EC tablet 81 mg  81 mg Oral Daily Milanes Marino, Maria, MD           PRN Medications  sulfur hexafluoride microspheres, white petrolatum, sodium chloride flush, sodium chloride, ondansetron **OR** ondansetron, polyethylene glycol, acetaminophen **OR** acetaminophen, glucose, dextrose bolus **OR** dextrose bolus, glucagon (rDNA), dextrose    +++++++++++++++++++++++++++++++++++++++++++++++++  Edison Bloom MD    [place], [place]  +++++++++++++++++++++++++++++++++++++++++++++++++  NOTE: This report was transcribed using voice recognition software. Every effort was made to ensure accuracy; however, inadvertent computerized transcription errors may be present.    I can be reached through [employer].    
**OR** acetaminophen, glucose, dextrose bolus **OR** dextrose bolus, glucagon (rDNA), dextrose    +++++++++++++++++++++++++++++++++++++++++++++++++  Edison Bloom MD    [place], [place]  +++++++++++++++++++++++++++++++++++++++++++++++++  NOTE: This report was transcribed using voice recognition software. Every effort was made to ensure accuracy; however, inadvertent computerized transcription errors may be present.    I can be reached through [employer].

## 2025-04-08 NOTE — FLOWSHEET NOTE
Inpatient Wound Care(6412)    Admit Date: 4/1/2025  2:03 PM    Reason for consult:  wound vac followed by podiatry    Significant history:    Preprocedure diagnosis:  Wounds, bilateral feet  Osteomyelitis, bilateral feet       Procedure:4/4  1.  Incision and drainage with bone biopsy, right heel  2.  Incision and drainage with bone biopsy, left heel  3. Incision and drainage with bone biopsy, right distal great toe  4.  Application of skin substitute, bilateral feet  5.  Application of wound VAC, right foot     Findings:     04/08/25 0829   Wound 04/01/25 Heel Right   Date First Assessed/Time First Assessed: 04/01/25 2222   Present on Original Admission: Yes  Primary Wound Type: Pressure Injury  Location: Heel  Wound Location Orientation: Right   Dressing Status Intact   Dressing/Treatment Negative pressure wound therapy   Negative Pressure Wound Therapy Heel Right   Placement Date/Time: 04/04/25 1810   Location: Heel  Wound Location Orientation: Right   Dressing Status Intact w/seal   Mode Continuous   Target Pressure (mmHg) 200   Intensity Low     Plan:  Chart reviewed  Dietary consult  Patient has wound vac at home      Sasha Segura RN 4/8/2025 8:30 AM      
40

## 2025-04-08 NOTE — DISCHARGE INSTR - COC
Continuity of Care Form    Patient Name: Aubrey Up   :  1940  MRN:  [id number]    Admit date:  2025  Discharge date:  25    Code Status Order: Full Code   Advance Directives:    Date/Time Healthcare Directive Type of Healthcare Directive Copy in Chart Healthcare Agent Appointed Healthcare Agent's Name Healthcare Agent's Phone Number    [phone] Yes, patient has an advance directive for healthcare treatment  Durable power of  for health care  No, copy requested from family  Spouse  Rosa Isela Orona  [phone]             Admitting Physician:  Maria Milanes Marino, MD  PCP: Dionne Singh DO    Discharging Nurse: rosana painting  Discharging Hospital Unit/Room#: 6412/6412-A  Discharging Unit Phone Number: [phone]    Emergency Contact:   Extended Emergency Contact Information  Primary Emergency Contact: DeRosa Isela pisano  Address: [address] [place] [address]  Home Phone: [phone]  Mobile Phone: [phone]  Relation: Spouse  Secondary Emergency Contact: Aubrey Up II  Home Phone: [phone]  Mobile Phone: [phone]  Relation: Child    Past Surgical History:  Past Surgical History:   Procedure Laterality Date    CERVICAL DISC SURGERY      CHOLECYSTECTOMY      FOOT DEBRIDEMENT Bilateral 2025    BILATERAL LOWER EXTREMITY INCISION AND DRAINAGE, BONE BIOPSY, SKIN GRAFT APPLICATION, WOUND VAC APPLICATION performed by Moiz Gross DPM at [facility] OR    KNEE ARTHROPLASTY      PROSTATE BIOPSY      THORACENTESIS Left 2025    550ml clear/yellow pleural fluid drained.    UPPER GASTROINTESTINAL ENDOSCOPY N/A 2024    ESOPHAGOGASTRODUODENOSCOPY PERCUTANEOUS ENDOSCOPIC GASTROSTOMY TUBE PLACEMENT performed by Daryl Stokes MD at [facility] ENDOSCOPY       Immunization History:   Immunization History   Administered Date(s) Administered    COVID-19, PFIZER Bivalent, DO NOT Dilute, (age 12y+), IM, 30 mcg/0.3 mL 09/15/2022    COVID-19, PFIZER PURPLE

## 2025-04-08 NOTE — PLAN OF CARE
Problem: Chronic Conditions and Co-morbidities  Goal: Patient's chronic conditions and co-morbidity symptoms are monitored and maintained or improved  4/1/2025 2332 by Ernesto Razo RN  Outcome: Progressing  4/1/2025 1540 by Amber Pineda RN  Outcome: Progressing  4/1/2025 1431 by Ambre Pineda RN  Outcome: Progressing     Problem: Discharge Planning  Goal: Discharge to home or other facility with appropriate resources  4/1/2025 2332 by Ernesto Razo RN  Outcome: Progressing  4/1/2025 1540 by Amber Pineda RN  Outcome: Progressing  4/1/2025 1431 by Amber Pineda RN  Outcome: Progressing     Problem: Skin/Tissue Integrity  Goal: Skin integrity remains intact  Description: 1.  Monitor for areas of redness and/or skin breakdown  2.  Assess vascular access sites hourly  3.  Every 4-6 hours minimum:  Change oxygen saturation probe site  4.  Every 4-6 hours:  If on nasal continuous positive airway pressure, respiratory therapy assess nares and determine need for appliance change or resting period  4/1/2025 2332 by Ernesto Razo RN  Outcome: Progressing  4/1/2025 1540 by Amber Pineda RN  Outcome: Progressing  4/1/2025 1431 by Amber Pineda RN  Outcome: Progressing     Problem: Safety - Adult  Goal: Free from fall injury  4/1/2025 2332 by Ernesto Razo RN  Outcome: Progressing  4/1/2025 1540 by Amber Pineda RN  Outcome: Progressing  4/1/2025 1431 by Amber Pineda RN  Outcome: Progressing     
  Problem: Chronic Conditions and Co-morbidities  Goal: Patient's chronic conditions and co-morbidity symptoms are monitored and maintained or improved  4/2/2025 1157 by Tania Tompkins RN  Outcome: Progressing  4/1/2025 2332 by Ernesto Razo RN  Outcome: Progressing     Problem: Discharge Planning  Goal: Discharge to home or other facility with appropriate resources  4/2/2025 1157 by Tania Tompkins RN  Outcome: Progressing  4/1/2025 2332 by Ernesto Razo RN  Outcome: Progressing     Problem: Skin/Tissue Integrity  Goal: Skin integrity remains intact  Description: 1.  Monitor for areas of redness and/or skin breakdown  2.  Assess vascular access sites hourly  3.  Every 4-6 hours minimum:  Change oxygen saturation probe site  4.  Every 4-6 hours:  If on nasal continuous positive airway pressure, respiratory therapy assess nares and determine need for appliance change or resting period  4/2/2025 1157 by Tania Tompkins RN  Outcome: Progressing  4/1/2025 2332 by Ernesto Razo RN  Outcome: Progressing     Problem: Safety - Adult  Goal: Free from fall injury  4/2/2025 1157 by Tania Tompkins RN  Outcome: Progressing  4/1/2025 2332 by Ernesto Razo RN  Outcome: Progressing     Problem: Cardiovascular - Adult  Goal: Maintains optimal cardiac output and hemodynamic stability  Outcome: Progressing  Goal: Absence of cardiac dysrhythmias or at baseline  Outcome: Progressing     Problem: Skin/Tissue Integrity - Adult  Goal: Skin integrity remains intact  Description: 1.  Monitor for areas of redness and/or skin breakdown  2.  Assess vascular access sites hourly  3.  Every 4-6 hours minimum:  Change oxygen saturation probe site  4.  Every 4-6 hours:  If on nasal continuous positive airway pressure, respiratory therapy assess nares and determine need for appliance change or resting period  4/2/2025 1157 by Tania Tompkins RN  Outcome: Progressing  4/1/2025 2332 by Ernesto Razo RN  Outcome: Progressing   
  Problem: Chronic Conditions and Co-morbidities  Goal: Patient's chronic conditions and co-morbidity symptoms are monitored and maintained or improved  4/3/2025 1230 by iMchelle Vazquez RN  Outcome: Progressing  Flowsheets (Taken 4/3/2025 1230)  Care Plan - Patient's Chronic Conditions and Co-Morbidity Symptoms are Monitored and Maintained or Improved:   Monitor and assess patient's chronic conditions and comorbid symptoms for stability, deterioration, or improvement   Update acute care plan with appropriate goals if chronic or comorbid symptoms are exacerbated and prevent overall improvement and discharge   Collaborate with multidisciplinary team to address chronic and comorbid conditions and prevent exacerbation or deterioration  4/3/2025 0102 by Arminda Chao, RN  Outcome: Progressing  Flowsheets (Taken 4/2/2025 1330 by Joann Hewitt, RN)  Care Plan - Patient's Chronic Conditions and Co-Morbidity Symptoms are Monitored and Maintained or Improved: Monitor and assess patient's chronic conditions and comorbid symptoms for stability, deterioration, or improvement     Problem: Discharge Planning  Goal: Discharge to home or other facility with appropriate resources  4/3/2025 1230 by Michelle Vazquez RN  Outcome: Progressing  Flowsheets (Taken 4/3/2025 1230)  Discharge to home or other facility with appropriate resources:   Arrange for needed discharge resources and transportation as appropriate   Identify barriers to discharge with patient and caregiver   Identify discharge learning needs (meds, wound care, etc)   Refer to discharge planning if patient needs post-hospital services based on physician order or complex needs related to functional status, cognitive ability or social support system  4/3/2025 0102 by Arminda Chao, RN  Outcome: Progressing  Flowsheets (Taken 4/2/2025 1330 by Joann Hewitt, RN)  Discharge to home or other facility with appropriate resources: Identify barriers to discharge with 
  Problem: Chronic Conditions and Co-morbidities  Goal: Patient's chronic conditions and co-morbidity symptoms are monitored and maintained or improved  4/7/2025 1247 by Anastasia Vega RN  Outcome: Progressing  4/7/2025 0227 by Leeann Alcala RN  Outcome: Progressing     Problem: Discharge Planning  Goal: Discharge to home or other facility with appropriate resources  4/7/2025 1247 by Anastasia Vega RN  Outcome: Progressing  4/7/2025 0227 by Leeann Alcala RN  Outcome: Progressing     Problem: Skin/Tissue Integrity  Goal: Skin integrity remains intact  Description: 1.  Monitor for areas of redness and/or skin breakdown  2.  Assess vascular access sites hourly  3.  Every 4-6 hours minimum:  Change oxygen saturation probe site  4.  Every 4-6 hours:  If on nasal continuous positive airway pressure, respiratory therapy assess nares and determine need for appliance change or resting period  4/7/2025 1247 by Anastasia Vega RN  Outcome: Progressing  4/7/2025 0227 by Leeann Alcala RN  Outcome: Progressing     Problem: Safety - Adult  Goal: Free from fall injury  4/7/2025 1247 by Anastasia Vega RN  Outcome: Progressing  4/7/2025 0227 by Leeann Alcala RN  Outcome: Progressing     Problem: Cardiovascular - Adult  Goal: Maintains optimal cardiac output and hemodynamic stability  4/7/2025 1247 by Anastasia Vega RN  Outcome: Progressing  4/7/2025 0227 by Leeann Alcala RN  Outcome: Progressing  Flowsheets (Taken 4/6/2025 1945)  Maintains optimal cardiac output and hemodynamic stability: Monitor blood pressure and heart rate  Goal: Absence of cardiac dysrhythmias or at baseline  4/7/2025 1247 by Anastasia Vega RN  Outcome: Progressing  4/7/2025 0227 by Leeann Alcala RN  Outcome: Progressing  Flowsheets (Taken 4/6/2025 1945)  Absence of cardiac dysrhythmias or at baseline: Monitor cardiac rate and rhythm     Problem: Skin/Tissue Integrity - Adult  Goal: Skin integrity remains 
  Problem: Chronic Conditions and Co-morbidities  Goal: Patient's chronic conditions and co-morbidity symptoms are monitored and maintained or improved  Outcome: Progressing     Problem: Discharge Planning  Goal: Discharge to home or other facility with appropriate resources  Outcome: Progressing     Problem: Skin/Tissue Integrity  Goal: Skin integrity remains intact  Description: 1.  Monitor for areas of redness and/or skin breakdown  2.  Assess vascular access sites hourly  3.  Every 4-6 hours minimum:  Change oxygen saturation probe site  4.  Every 4-6 hours:  If on nasal continuous positive airway pressure, respiratory therapy assess nares and determine need for appliance change or resting period  4/8/2025 1352 by Clarissa Leiva RN  Outcome: Progressing  4/8/2025 0343 by Elenita Metz RN  Outcome: Progressing     Problem: Safety - Adult  Goal: Free from fall injury  Outcome: Progressing     Problem: Cardiovascular - Adult  Goal: Maintains optimal cardiac output and hemodynamic stability  Outcome: Progressing  Goal: Absence of cardiac dysrhythmias or at baseline  Outcome: Progressing     Problem: Skin/Tissue Integrity - Adult  Goal: Skin integrity remains intact  Description: 1.  Monitor for areas of redness and/or skin breakdown  2.  Assess vascular access sites hourly  3.  Every 4-6 hours minimum:  Change oxygen saturation probe site  4.  Every 4-6 hours:  If on nasal continuous positive airway pressure, respiratory therapy assess nares and determine need for appliance change or resting period  4/8/2025 1352 by Clarissa Leiva RN  Outcome: Progressing  4/8/2025 0343 by Elenita Metz RN  Outcome: Progressing     Problem: Musculoskeletal - Adult  Goal: Return mobility to safest level of function  Outcome: Progressing     Problem: Metabolic/Fluid and Electrolytes - Adult  Goal: Electrolytes maintained within normal limits  Outcome: Progressing     Problem: Hematologic - Adult  Goal: Maintains 
  Problem: Chronic Conditions and Co-morbidities  Goal: Patient's chronic conditions and co-morbidity symptoms are monitored and maintained or improved  Outcome: Progressing  Flowsheets (Taken 4/4/2025 0730)  Care Plan - Patient's Chronic Conditions and Co-Morbidity Symptoms are Monitored and Maintained or Improved: Monitor and assess patient's chronic conditions and comorbid symptoms for stability, deterioration, or improvement     Problem: Discharge Planning  Goal: Discharge to home or other facility with appropriate resources  Outcome: Progressing  Flowsheets (Taken 4/4/2025 0730)  Discharge to home or other facility with appropriate resources: Identify barriers to discharge with patient and caregiver     Problem: Skin/Tissue Integrity  Goal: Skin integrity remains intact  Description: 1.  Monitor for areas of redness and/or skin breakdown  2.  Assess vascular access sites hourly  3.  Every 4-6 hours minimum:  Change oxygen saturation probe site  4.  Every 4-6 hours:  If on nasal continuous positive airway pressure, respiratory therapy assess nares and determine need for appliance change or resting period  Outcome: Progressing  Flowsheets (Taken 4/4/2025 0730)  Skin Integrity Remains Intact: Monitor for areas of redness and/or skin breakdown     Problem: Safety - Adult  Goal: Free from fall injury  Outcome: Progressing     Problem: Cardiovascular - Adult  Goal: Maintains optimal cardiac output and hemodynamic stability  Outcome: Progressing  Flowsheets (Taken 4/4/2025 0730)  Maintains optimal cardiac output and hemodynamic stability: Monitor blood pressure and heart rate  Goal: Absence of cardiac dysrhythmias or at baseline  Outcome: Progressing  Flowsheets (Taken 4/4/2025 0730)  Absence of cardiac dysrhythmias or at baseline: Monitor cardiac rate and rhythm     Problem: Skin/Tissue Integrity - Adult  Goal: Skin integrity remains intact  Description: 1.  Monitor for areas of redness and/or skin breakdown  2.  
  Problem: Skin/Tissue Integrity  Goal: Skin integrity remains intact  Description: 1.  Monitor for areas of redness and/or skin breakdown  2.  Assess vascular access sites hourly  3.  Every 4-6 hours minimum:  Change oxygen saturation probe site  4.  Every 4-6 hours:  If on nasal continuous positive airway pressure, respiratory therapy assess nares and determine need for appliance change or resting period  Outcome: Progressing     Problem: Safety - Adult  Goal: Free from fall injury  Outcome: Progressing     
  Problem: Skin/Tissue Integrity  Goal: Skin integrity remains intact  Description: 1.  Monitor for areas of redness and/or skin breakdown  2.  Assess vascular access sites hourly  3.  Every 4-6 hours minimum:  Change oxygen saturation probe site  4.  Every 4-6 hours:  If on nasal continuous positive airway pressure, respiratory therapy assess nares and determine need for appliance change or resting period  Outcome: Progressing     Problem: Skin/Tissue Integrity - Adult  Goal: Skin integrity remains intact  Description: 1.  Monitor for areas of redness and/or skin breakdown  2.  Assess vascular access sites hourly  3.  Every 4-6 hours minimum:  Change oxygen saturation probe site  4.  Every 4-6 hours:  If on nasal continuous positive airway pressure, respiratory therapy assess nares and determine need for appliance change or resting period  Outcome: Progressing     
  Problem: Hematologic - Adult  Goal: Maintains hematologic stability  Outcome: Progressing     Problem: Nutrition Deficit:  Goal: Optimize nutritional status  Outcome: Progressing     Problem: ABCDS Injury Assessment  Goal: Absence of physical injury  Outcome: Progressing     
Assessment  Goal: Absence of physical injury  4/5/2025 1936 by Fiorella Kendall, RN  Outcome: Progressing  4/5/2025 1513 by Jeff Bentley, RN  Outcome: Progressing     
Leeann Alcala, RN)  Maintains optimal cardiac output and hemodynamic stability: Monitor blood pressure and heart rate  4/4/2025 1006 by Leeann Alcala RN  Outcome: Progressing  Flowsheets (Taken 4/4/2025 0730)  Maintains optimal cardiac output and hemodynamic stability: Monitor blood pressure and heart rate     Problem: Cardiovascular - Adult  Goal: Absence of cardiac dysrhythmias or at baseline  4/4/2025 2004 by Fiorella Kendall RN  Outcome: Progressing  Flowsheets (Taken 4/4/2025 1450 by Leeann Alcala, RN)  Absence of cardiac dysrhythmias or at baseline: Monitor cardiac rate and rhythm  4/4/2025 1006 by Leeann Alcala, RN  Outcome: Progressing  Flowsheets (Taken 4/4/2025 0730)  Absence of cardiac dysrhythmias or at baseline: Monitor cardiac rate and rhythm     
breakdown  2.  Assess vascular access sites hourly  3.  Every 4-6 hours minimum:  Change oxygen saturation probe site  4.  Every 4-6 hours:  If on nasal continuous positive airway pressure, respiratory therapy assess nares and determine need for appliance change or resting period  4/3/2025 0102 by Arminda Chao RN  Outcome: Progressing  Flowsheets (Taken 4/2/2025 1330 by Joann Hewitt RN)  Skin Integrity Remains Intact: Monitor for areas of redness and/or skin breakdown  4/2/2025 1157 by Tania Tompkins RN  Outcome: Progressing     Problem: Musculoskeletal - Adult  Goal: Return mobility to safest level of function  4/3/2025 0102 by Arminda Chao RN  Outcome: Progressing  Flowsheets (Taken 4/2/2025 1330 by Joann Hewitt RN)  Return Mobility to Safest Level of Function:   Assess patient stability and activity tolerance for standing, transferring and ambulating with or without assistive devices   Assist with transfers and ambulation using safe patient handling equipment as needed  4/2/2025 1157 by Tania Tompkins RN  Outcome: Progressing     Problem: Metabolic/Fluid and Electrolytes - Adult  Goal: Electrolytes maintained within normal limits  4/3/2025 0102 by Arminda Chao RN  Outcome: Progressing  Flowsheets (Taken 4/2/2025 1330 by Joann Hewitt RN)  Electrolytes maintained within normal limits: Monitor labs and assess patient for signs and symptoms of electrolyte imbalances  4/2/2025 1157 by Tania Tompkins RN  Outcome: Progressing     Problem: Hematologic - Adult  Goal: Maintains hematologic stability  4/3/2025 0102 by Arminda Chao RN  Outcome: Progressing  Flowsheets (Taken 4/2/2025 1330 by Joann Hewitt RN)  Maintains hematologic stability: Assess for signs and symptoms of bleeding or hemorrhage  4/2/2025 1157 by Tania Tompkins RN  Outcome: Progressing     Problem: Nutrition Deficit:  Goal: Optimize nutritional status  Outcome: Progressing     
injury  Outcome: Progressing

## 2025-04-08 NOTE — CARE COORDINATION
Patient remains hospitalized   with hyponatremia which is resolved. s/p BLE I/D with skin graft substitute application.  4/4. Wound vac in place. Has wound vac from [facility] at home. [facility] following for SN/PT  and orders in Epic . If IV antibiotics needed for discharge, will need Picc line inserted and check insurance coverage once final ID plan. Referral made to Ernesto from [facility] as family had no preference.   Await order for PT/OT.  Per son they are declining any [facility].  Aubrie from [facility]  stated that patient returned his Bipap 3/3 . This CM spoke with patient's son and he [facility] Also, a walking pox will be needed to see if qualifies for home Oxygen. Patient's son stated if oxygen needed at home would like to use medicare insurance. Aubrie from [facility] will follow.    Per son they have a hospital bed and will bring in patient's cam boots for therapy when ordered.   Son will transport home when medically cleared.CM/SW will continue to follow  Addendum. Per Ernesto at [facility] the cost for current IV antibiotics delta be  $595.04 a week . Informed patient's wife at patient's bedside and she needs to speak with the family regarding the cost. Declines [facility] at this time but if necessary would want [facility] in [place].as patient has been in the past. Referral made and Kailee their liaison has accepted.  Await final ID recommendations for home iV antibiotics and family's decision home with [facility] vs [facility].Per Ernesto the patient has no IV antibiotic coverage with  his supplemental insurance. IF [facility] is family's choice will need transport form and PASR CM/SW will continue to follow  ADDENdum Pasr and Ambulance form in envelope in soft chart in the event family chooses [facility] Order in for Picc  
Social Work/ Case Management Transition of Care Planning (Cinthia Allen, [phone]):     Per report and chart review Pt is on room air. Pt on IV Lactated Ringers, Pt is currently non weight bearing on both legs. PT/OT eval when appropriate. BUN 76, Creatinine 2.2. Pt for I&D with bone biopsy Friday 4/4. ID, Cardio, Nephro, Podiatry following. Pulmonology consulted. Pt and family want discharge home with [facility], [facility] orders are in. SW/CM to follow.  Cinthia Allen, DOMINICK  4/3/2025    
Social Work/ Case Management Transition of Care Planning (Cinthia Allen, [phone]):     Per report and chart review Pt is on room air. Pt on lactated ringers. Pt NWB bilateral LE. PT/OT pending. ID, Pulmonology, Cardiology, Nephrology, Podiatry following. Pt for L thoracentesis.  Pt for possible bilateral LE I&D w/bone biopsy tomorrow pending cardio clearance. Pt is active with [facility], [facility] orders are in. Pt plans to discharge home with [facility] and his family will transport. SW/CM to follow.  Cinthia Allen, DOMINICK  4/3/2025    
Social Work/ Case Management Transition of Care Planning (Cinthia Allen, DOMINICK [phone]):     Per report and chart review Pt is on room air. Pt for L thoracentesis today. Cardio clearance for I & D with Bone biopsy. BUN 73, Creatinine 2.0. Pulmonology, ID, Nephrology, Cardiology, Podiatry. Pt is active with [facility], [facility] orders are in. Pt has hx with [employer] for NIV for PRN nighttime/nap usage, liaison checking to make sure order it is active.  Pt plans to discharge home with [facility] and his family will transport. SW/CM to follow.  DOMINICK Ríos  4/4/2025    
Transition of care update. Per rounds, attending to discharge patient. Spoke with benja Burgos who is in agreement with the discharge plan to [facility]. Transport set up with [facility] for 1 pm to 3 pm. Updated anay Dugan, benja Burgos, and RN. Updated Ernesto at [facility], and updated Mary at "[employer]. They will follow at [facility]. [facility] and ambulance form is in an envelope on the soft chart. LINO and destination updated. Per anay Dugan at [facility], they are all set with IV antibiotics and wound vac. CM will follow.  Electronically signed by Jose Fuentes RN on 4/8/2025 at 11:22 AM    
agrees with the discharge plan. Freedom of choice list with basic dialogue that supports the patient's individualized plan of care/goals and shares the quality data associated with the providers was provided to:     Patient Representative Name:       The Patient and/or Patient Representative Agree with the Discharge Plan?      Cinthia Allen [facility]  Case Management Department  Ph: [phone] Fax: [phone]

## 2025-04-10 LAB
MICROORGANISM SPEC CULT: ABNORMAL
MICROORGANISM SPEC CULT: NORMAL
MICROORGANISM/AGENT SPEC: ABNORMAL
MICROORGANISM/AGENT SPEC: NORMAL
SERVICE CMNT-IMP: ABNORMAL
SERVICE CMNT-IMP: NORMAL
SPECIMEN DESCRIPTION: ABNORMAL
SPECIMEN DESCRIPTION: NORMAL

## 2025-04-14 LAB
MICROORGANISM SPEC CULT: NORMAL
SERVICE CMNT-IMP: NORMAL
SPECIMEN DESCRIPTION: NORMAL
SURGICAL PATHOLOGY REPORT: NORMAL

## 2025-04-15 LAB
MICROORGANISM SPEC CULT: NORMAL
MICROORGANISM/AGENT SPEC: NORMAL
SERVICE CMNT-IMP: NORMAL
SPECIMEN DESCRIPTION: NORMAL

## 2025-04-16 ENCOUNTER — TRANSCRIBE ORDERS (OUTPATIENT)
Age: 85
End: 2025-04-16

## 2025-04-16 DIAGNOSIS — I31.39 PERICARDIAL EFFUSION: Primary | ICD-10-CM

## 2025-04-25 LAB
MICROORGANISM SPEC CULT: ABNORMAL
MICROORGANISM/AGENT SPEC: ABNORMAL
SERVICE CMNT-IMP: ABNORMAL
SPECIMEN DESCRIPTION: ABNORMAL

## 2025-05-15 LAB
SEND OUT REPORT: NORMAL
TEST NAME: NORMAL

## 2025-05-26 LAB
MICROORGANISM SPEC CULT: NORMAL
MICROORGANISM/AGENT SPEC: NORMAL
SERVICE CMNT-IMP: NORMAL
SPECIMEN DESCRIPTION: NORMAL

## (undated) DEVICE — PADDING,UNDERCAST,COTTON, 4"X4YD STERILE: Brand: MEDLINE

## (undated) DEVICE — NEEDLE BX 11GA L101MM BNE MAR ASPIR W/ ADPT JAMSH

## (undated) DEVICE — DRESSING PETRO W3XL8IN OIL EMUL N ADH GZ KNIT IMPREG CELOS

## (undated) DEVICE — TRAP,MUCUS SPECIMEN,40CC: Brand: MEDLINE

## (undated) DEVICE — BANDAGE,GAUZE,4.5"X4.1YD,STERILE,LF: Brand: MEDLINE

## (undated) DEVICE — SYRINGE MED 10ML TRNSLUC BRL PLUNG BLK MRK POLYPR CTRL

## (undated) DEVICE — CANISTER NEG PRSS 1000ML W/ GEL INFOVAC

## (undated) DEVICE — LOWER EXT KNEE DRAPE: Brand: MEDLINE INDUSTRIES, INC.

## (undated) DEVICE — KIT NEG PRSS M W12.5XH3.2XL18CM 2 SHT STD DRP 1 SENSATRAC

## (undated) DEVICE — GAUZE,SPONGE,4"X4",8PLY,STRL,LF,10/TRAY: Brand: MEDLINE

## (undated) DEVICE — Device

## (undated) DEVICE — BANDAGE COMPR W4INXL10YD WHITE/BEIGE E MTRX HK LOOP CLSR